# Patient Record
Sex: MALE | Race: WHITE | Employment: OTHER | URBAN - METROPOLITAN AREA
[De-identification: names, ages, dates, MRNs, and addresses within clinical notes are randomized per-mention and may not be internally consistent; named-entity substitution may affect disease eponyms.]

---

## 2017-06-05 ENCOUNTER — OFFICE VISIT (OUTPATIENT)
Dept: ORTHOPEDIC SURGERY | Age: 77
End: 2017-06-05

## 2017-06-05 VITALS
BODY MASS INDEX: 39.24 KG/M2 | HEIGHT: 67 IN | WEIGHT: 250 LBS | SYSTOLIC BLOOD PRESSURE: 120 MMHG | DIASTOLIC BLOOD PRESSURE: 73 MMHG | HEART RATE: 73 BPM

## 2017-06-05 DIAGNOSIS — M25.562 LEFT KNEE PAIN, UNSPECIFIED CHRONICITY: ICD-10-CM

## 2017-06-05 DIAGNOSIS — M17.12 PRIMARY LOCALIZED OSTEOARTHROSIS, LOWER LEG, LEFT: Primary | ICD-10-CM

## 2017-06-05 PROCEDURE — 99213 OFFICE O/P EST LOW 20 MIN: CPT | Performed by: ORTHOPAEDIC SURGERY

## 2017-06-05 PROCEDURE — 73564 X-RAY EXAM KNEE 4 OR MORE: CPT | Performed by: ORTHOPAEDIC SURGERY

## 2017-06-05 PROCEDURE — 1123F ACP DISCUSS/DSCN MKR DOCD: CPT | Performed by: ORTHOPAEDIC SURGERY

## 2017-06-05 PROCEDURE — 4004F PT TOBACCO SCREEN RCVD TLK: CPT | Performed by: ORTHOPAEDIC SURGERY

## 2017-06-05 PROCEDURE — 4040F PNEUMOC VAC/ADMIN/RCVD: CPT | Performed by: ORTHOPAEDIC SURGERY

## 2017-06-05 PROCEDURE — 20611 DRAIN/INJ JOINT/BURSA W/US: CPT | Performed by: ORTHOPAEDIC SURGERY

## 2017-06-05 PROCEDURE — G8427 DOCREV CUR MEDS BY ELIG CLIN: HCPCS | Performed by: ORTHOPAEDIC SURGERY

## 2017-06-05 PROCEDURE — G8417 CALC BMI ABV UP PARAM F/U: HCPCS | Performed by: ORTHOPAEDIC SURGERY

## 2017-09-12 ENCOUNTER — NURSE ONLY (OUTPATIENT)
Dept: ORTHOPEDIC SURGERY | Age: 77
End: 2017-09-12

## 2017-09-12 DIAGNOSIS — M17.12 PRIMARY OSTEOARTHRITIS OF LEFT KNEE: Primary | ICD-10-CM

## 2017-09-12 PROCEDURE — 20611 DRAIN/INJ JOINT/BURSA W/US: CPT | Performed by: PHYSICIAN ASSISTANT

## 2018-01-09 ENCOUNTER — OFFICE VISIT (OUTPATIENT)
Dept: ORTHOPEDIC SURGERY | Age: 78
End: 2018-01-09

## 2018-01-09 VITALS
DIASTOLIC BLOOD PRESSURE: 86 MMHG | HEIGHT: 67 IN | HEART RATE: 50 BPM | SYSTOLIC BLOOD PRESSURE: 153 MMHG | WEIGHT: 250 LBS | BODY MASS INDEX: 39.24 KG/M2

## 2018-01-09 DIAGNOSIS — M25.562 LEFT KNEE PAIN, UNSPECIFIED CHRONICITY: ICD-10-CM

## 2018-01-09 DIAGNOSIS — M17.12 PRIMARY OSTEOARTHRITIS OF LEFT KNEE: Primary | ICD-10-CM

## 2018-01-09 PROCEDURE — G8427 DOCREV CUR MEDS BY ELIG CLIN: HCPCS | Performed by: PHYSICIAN ASSISTANT

## 2018-01-09 PROCEDURE — G8484 FLU IMMUNIZE NO ADMIN: HCPCS | Performed by: PHYSICIAN ASSISTANT

## 2018-01-09 PROCEDURE — 4004F PT TOBACCO SCREEN RCVD TLK: CPT | Performed by: PHYSICIAN ASSISTANT

## 2018-01-09 PROCEDURE — 99213 OFFICE O/P EST LOW 20 MIN: CPT | Performed by: PHYSICIAN ASSISTANT

## 2018-01-09 PROCEDURE — G8417 CALC BMI ABV UP PARAM F/U: HCPCS | Performed by: PHYSICIAN ASSISTANT

## 2018-01-09 PROCEDURE — 1123F ACP DISCUSS/DSCN MKR DOCD: CPT | Performed by: PHYSICIAN ASSISTANT

## 2018-01-09 PROCEDURE — 20611 DRAIN/INJ JOINT/BURSA W/US: CPT | Performed by: PHYSICIAN ASSISTANT

## 2018-01-09 PROCEDURE — 4040F PNEUMOC VAC/ADMIN/RCVD: CPT | Performed by: PHYSICIAN ASSISTANT

## 2018-01-09 NOTE — PROGRESS NOTES
Chief Complaint    Knee Pain (LEFT knee pain; would like to try Euflexxa series; Cortisone on 9/12/17 helped for a while)      History of Present Illness:  Valerie Salazar is a 69 y.o. male presents to the office today for a follow-up visit. Being treated for left knee osteoarthritis. He received a cortisone injection in June and September. His results were favorable but his symptoms have returned. Pain concentrated over the medial aspect of his knee. Increased pain with activities and improvement with rest.  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 Pain Assessment  Location of Pain: Knee  Location Modifiers: Left  Severity of Pain: 2  Quality of Pain: Throbbing, Sharp, Dull, Aching  Duration of Pain: Persistent  Frequency of Pain: Intermittent  Aggravating Factors: Walking, Standing, Bending, Straightening  Limiting Behavior: Yes  Relieving Factors: Rest]       Medical History:  Patient's medications, allergies, past medical, surgical, social and family histories were reviewed and updated as appropriate. Review of Systems:  Relevant review of systems reviewed and available in the patient's chart    Vital Signs:  Vitals:    01/09/18 1054   BP: (!) 153/86   Pulse: 50       General Exam:   Constitutional: Patient is adequately groomed with no evidence of malnutrition  DTRs: Deep tendon reflexes are intact  Mental Status: The patient is oriented to time, place and person. The patient's mood and affect are appropriate. Lymphatic: The lymphatic examination bilaterally reveals all areas to be without enlargement or induration. Vascular: Examination reveals no swelling or calf tenderness. Peripheral pulses are palpable and 2+. Neurological: The patient has good coordination. There is no weakness or sensory deficit. Body mass index is 39.15 kg/m².     Left Knee

## 2018-01-12 ENCOUNTER — TELEPHONE (OUTPATIENT)
Dept: ORTHOPEDIC SURGERY | Age: 78
End: 2018-01-12

## 2018-01-12 DIAGNOSIS — M17.12 PRIMARY LOCALIZED OSTEOARTHROSIS OF LEFT LOWER LEG: Primary | ICD-10-CM

## 2018-01-12 RX ORDER — TRAMADOL HYDROCHLORIDE 50 MG/1
50 TABLET ORAL EVERY 6 HOURS PRN
Qty: 12 TABLET | Refills: 0 | Status: SHIPPED | OUTPATIENT
Start: 2018-01-12 | End: 2018-01-15

## 2018-01-16 ENCOUNTER — NURSE ONLY (OUTPATIENT)
Dept: ORTHOPEDIC SURGERY | Age: 78
End: 2018-01-16

## 2018-01-16 DIAGNOSIS — M17.12 PRIMARY OSTEOARTHRITIS OF LEFT KNEE: Primary | ICD-10-CM

## 2018-01-16 PROCEDURE — 20611 DRAIN/INJ JOINT/BURSA W/US: CPT | Performed by: PHYSICIAN ASSISTANT

## 2018-01-16 NOTE — PROGRESS NOTES
Diagnosis: Left knee osteoarthritis    Procedure: Left knee Visco supplementation #2    The patient returns today for their second Euflexxa injection in the left knee. The risks, benefits, and complications of the injections were again discussed in detail with the patient. The risks discussed included but are not limited to infection, skin reactions, hot swollen joints, and anaphylaxis. The patient gave verbal informed consent for the injection. The patient skin was prepped with 3 sterile gauze pads soaked with alcohol solution and the knee joint was injected with 2 mL of Euflexxa intra-articularly under sterile conditions . Technique: Under sterile conditions a [employer] ultrasound unit with a variable frequency (6.0-15.0 MHz) linear transducer was used to localize the placement of a 22-gauge needle into the madai joint. Findings: Successful needle placement for intra-articular Visco supplementation injection. Final images were taken and saved for permanent record. The patient tolerated the injection reasonably well. The patient was given instructions to ice the the knee and avoid strenuous activities for 24-48 hours. The patient was instructed to call the office immediately if there is increased pain, redness, warmth, fever, or chills. We will see the patient back in one week for the third injection.

## 2018-01-16 NOTE — PROGRESS NOTES
1/16/18 9:49 AM     Site & comments:   LEFT KNEE #2    [address]: [address]-8    Lot number: [id number] ; EXP 11/26/2018    Product:  Sujey Sarkart

## 2018-01-23 ENCOUNTER — NURSE ONLY (OUTPATIENT)
Dept: ORTHOPEDIC SURGERY | Age: 78
End: 2018-01-23

## 2018-01-23 DIAGNOSIS — M17.12 PRIMARY OSTEOARTHRITIS OF LEFT KNEE: Primary | ICD-10-CM

## 2018-01-23 PROCEDURE — 20611 DRAIN/INJ JOINT/BURSA W/US: CPT | Performed by: PHYSICIAN ASSISTANT

## 2018-01-23 NOTE — PROGRESS NOTES
1/23/18 9:12 AM     Site & comments:   LEFT KNEE #3    NDC: [address]-1    Lot number: [id number] ; EXP 1/26/2018    Product:  Deena Wilder

## 2018-06-13 ENCOUNTER — NURSE ONLY (OUTPATIENT)
Dept: ORTHOPEDIC SURGERY | Age: 78
End: 2018-06-13

## 2018-06-13 DIAGNOSIS — M17.12 PRIMARY OSTEOARTHRITIS OF LEFT KNEE: Primary | ICD-10-CM

## 2018-06-13 PROCEDURE — 20611 DRAIN/INJ JOINT/BURSA W/US: CPT | Performed by: PHYSICIAN ASSISTANT

## 2020-06-20 ENCOUNTER — HOSPITAL ENCOUNTER (INPATIENT)
Age: 80
LOS: 3 days | Discharge: HOME | DRG: 378 | End: 2020-06-23
Attending: EMERGENCY MEDICINE | Admitting: INTERNAL MEDICINE
Payer: MEDICARE

## 2020-06-20 PROBLEM — K92.2 LOWER GI BLEED: Status: ACTIVE | Noted: 2020-06-20

## 2020-06-20 LAB
A/G RATIO: 1.5 (ref 1.1–2.2)
ABO/RH: NORMAL
ALBUMIN SERPL-MCNC: 3.2 G/DL (ref 3.4–5)
ALP BLD-CCNC: 70 U/L (ref 40–129)
ALT SERPL-CCNC: 14 U/L (ref 10–40)
ANION GAP SERPL CALCULATED.3IONS-SCNC: 7 MMOL/L (ref 3–16)
ANTIBODY SCREEN: NORMAL
AST SERPL-CCNC: 20 U/L (ref 15–37)
BASOPHILS ABSOLUTE: 0 K/UL (ref 0–0.2)
BASOPHILS RELATIVE PERCENT: 0.8 %
BILIRUB SERPL-MCNC: 0.5 MG/DL (ref 0–1)
BUN BLDV-MCNC: 44 MG/DL (ref 7–20)
C DIFF TOXIN/ANTIGEN: NORMAL
CALCIUM SERPL-MCNC: 8.8 MG/DL (ref 8.3–10.6)
CHLORIDE BLD-SCNC: 104 MMOL/L (ref 99–110)
CO2: 25 MMOL/L (ref 21–32)
CREAT SERPL-MCNC: 2.4 MG/DL (ref 0.8–1.3)
EOSINOPHILS ABSOLUTE: 0.2 K/UL (ref 0–0.6)
EOSINOPHILS RELATIVE PERCENT: 2.4 %
GFR AFRICAN AMERICAN: 32
GFR NON-AFRICAN AMERICAN: 26
GLOBULIN: 2.2 G/DL
GLUCOSE BLD-MCNC: 174 MG/DL (ref 70–99)
HCT VFR BLD CALC: 25.8 % (ref 40.5–52.5)
HCT VFR BLD CALC: 25.9 % (ref 40.5–52.5)
HCT VFR BLD CALC: 31 % (ref 40.5–52.5)
HEMOGLOBIN: 10.4 G/DL (ref 13.5–17.5)
HEMOGLOBIN: 8.6 G/DL (ref 13.5–17.5)
HEMOGLOBIN: 8.6 G/DL (ref 13.5–17.5)
INR BLD: 1.18 (ref 0.86–1.14)
LYMPHOCYTES ABSOLUTE: 1.4 K/UL (ref 1–5.1)
LYMPHOCYTES RELATIVE PERCENT: 22.2 %
MCH RBC QN AUTO: 33.2 PG (ref 26–34)
MCHC RBC AUTO-ENTMCNC: 33.6 G/DL (ref 31–36)
MCV RBC AUTO: 98.7 FL (ref 80–100)
MONOCYTES ABSOLUTE: 0.4 K/UL (ref 0–1.3)
MONOCYTES RELATIVE PERCENT: 5.8 %
NEUTROPHILS ABSOLUTE: 4.4 K/UL (ref 1.7–7.7)
NEUTROPHILS RELATIVE PERCENT: 68.8 %
OCCULT BLOOD SCREENING: ABNORMAL
PDW BLD-RTO: 13.9 % (ref 12.4–15.4)
PLATELET # BLD: 165 K/UL (ref 135–450)
PMV BLD AUTO: 9 FL (ref 5–10.5)
POTASSIUM SERPL-SCNC: 5 MMOL/L (ref 3.5–5.1)
PROTHROMBIN TIME: 13.7 SEC (ref 10–13.2)
RBC # BLD: 3.14 M/UL (ref 4.2–5.9)
SODIUM BLD-SCNC: 136 MMOL/L (ref 136–145)
SPECIMEN STATUS: NORMAL
TOTAL PROTEIN: 5.4 G/DL (ref 6.4–8.2)
WBC # BLD: 6.4 K/UL (ref 4–11)

## 2020-06-20 PROCEDURE — 6370000000 HC RX 637 (ALT 250 FOR IP): Performed by: INTERNAL MEDICINE

## 2020-06-20 PROCEDURE — 85610 PROTHROMBIN TIME: CPT

## 2020-06-20 PROCEDURE — 85014 HEMATOCRIT: CPT

## 2020-06-20 PROCEDURE — 80053 COMPREHEN METABOLIC PANEL: CPT

## 2020-06-20 PROCEDURE — P9016 RBC LEUKOCYTES REDUCED: HCPCS

## 2020-06-20 PROCEDURE — 36415 COLL VENOUS BLD VENIPUNCTURE: CPT

## 2020-06-20 PROCEDURE — 85018 HEMOGLOBIN: CPT

## 2020-06-20 PROCEDURE — 2580000003 HC RX 258: Performed by: INTERNAL MEDICINE

## 2020-06-20 PROCEDURE — 99285 EMERGENCY DEPT VISIT HI MDM: CPT

## 2020-06-20 PROCEDURE — 86850 RBC ANTIBODY SCREEN: CPT

## 2020-06-20 PROCEDURE — 86901 BLOOD TYPING SEROLOGIC RH(D): CPT

## 2020-06-20 PROCEDURE — 85025 COMPLETE CBC W/AUTO DIFF WBC: CPT

## 2020-06-20 PROCEDURE — 87324 CLOSTRIDIUM AG IA: CPT

## 2020-06-20 PROCEDURE — G0328 FECAL BLOOD SCRN IMMUNOASSAY: HCPCS

## 2020-06-20 PROCEDURE — 87449 NOS EACH ORGANISM AG IA: CPT

## 2020-06-20 PROCEDURE — 86923 COMPATIBILITY TEST ELECTRIC: CPT

## 2020-06-20 PROCEDURE — 2060000000 HC ICU INTERMEDIATE R&B

## 2020-06-20 PROCEDURE — 2580000003 HC RX 258: Performed by: EMERGENCY MEDICINE

## 2020-06-20 PROCEDURE — 86900 BLOOD TYPING SEROLOGIC ABO: CPT

## 2020-06-20 RX ORDER — ANTIARTHRITIC COMBINATION NO.2 900 MG
TABLET ORAL
COMMUNITY

## 2020-06-20 RX ORDER — ASPIRIN 325 MG
325 TABLET ORAL DAILY
Status: ON HOLD | COMMUNITY
End: 2020-06-23 | Stop reason: HOSPADM

## 2020-06-20 RX ORDER — MONTELUKAST SODIUM 10 MG/1
10 TABLET ORAL NIGHTLY
Status: DISCONTINUED | OUTPATIENT
Start: 2020-06-20 | End: 2020-06-23 | Stop reason: HOSPADM

## 2020-06-20 RX ORDER — LOVASTATIN 20 MG/1
20 TABLET ORAL NIGHTLY
COMMUNITY

## 2020-06-20 RX ORDER — TRAMADOL HYDROCHLORIDE 50 MG/1
50 TABLET ORAL EVERY 6 HOURS PRN
Status: DISCONTINUED | OUTPATIENT
Start: 2020-06-20 | End: 2020-06-23 | Stop reason: HOSPADM

## 2020-06-20 RX ORDER — ATORVASTATIN CALCIUM 10 MG/1
10 TABLET, FILM COATED ORAL NIGHTLY
Status: DISCONTINUED | OUTPATIENT
Start: 2020-06-20 | End: 2020-06-23 | Stop reason: HOSPADM

## 2020-06-20 RX ORDER — 0.9 % SODIUM CHLORIDE 0.9 %
2000 INTRAVENOUS SOLUTION INTRAVENOUS ONCE
Status: COMPLETED | OUTPATIENT
Start: 2020-06-20 | End: 2020-06-20

## 2020-06-20 RX ORDER — ASCORBIC ACID 500 MG
500 TABLET ORAL DAILY
Status: DISCONTINUED | OUTPATIENT
Start: 2020-06-20 | End: 2020-06-23 | Stop reason: HOSPADM

## 2020-06-20 RX ORDER — SODIUM CHLORIDE 0.9 % (FLUSH) 0.9 %
10 SYRINGE (ML) INJECTION PRN
Status: DISCONTINUED | OUTPATIENT
Start: 2020-06-20 | End: 2020-06-23 | Stop reason: HOSPADM

## 2020-06-20 RX ORDER — PROCHLORPERAZINE EDISYLATE 5 MG/ML
10 INJECTION INTRAMUSCULAR; INTRAVENOUS EVERY 6 HOURS PRN
Status: DISCONTINUED | OUTPATIENT
Start: 2020-06-20 | End: 2020-06-23 | Stop reason: HOSPADM

## 2020-06-20 RX ORDER — SODIUM CHLORIDE 0.9 % (FLUSH) 0.9 %
10 SYRINGE (ML) INJECTION EVERY 12 HOURS SCHEDULED
Status: DISCONTINUED | OUTPATIENT
Start: 2020-06-20 | End: 2020-06-23 | Stop reason: HOSPADM

## 2020-06-20 RX ORDER — POLYETHYLENE GLYCOL 3350 17 G/17G
17 POWDER, FOR SOLUTION ORAL DAILY PRN
Status: DISCONTINUED | OUTPATIENT
Start: 2020-06-20 | End: 2020-06-23 | Stop reason: HOSPADM

## 2020-06-20 RX ORDER — ACETAMINOPHEN 325 MG/1
650 TABLET ORAL EVERY 6 HOURS PRN
Status: DISCONTINUED | OUTPATIENT
Start: 2020-06-20 | End: 2020-06-23 | Stop reason: HOSPADM

## 2020-06-20 RX ORDER — SODIUM CHLORIDE, SODIUM LACTATE, POTASSIUM CHLORIDE, CALCIUM CHLORIDE 600; 310; 30; 20 MG/100ML; MG/100ML; MG/100ML; MG/100ML
INJECTION, SOLUTION INTRAVENOUS CONTINUOUS
Status: DISCONTINUED | OUTPATIENT
Start: 2020-06-20 | End: 2020-06-23 | Stop reason: HOSPADM

## 2020-06-20 RX ORDER — LEVOTHYROXINE SODIUM 0.05 MG/1
50 TABLET ORAL DAILY
Status: DISCONTINUED | OUTPATIENT
Start: 2020-06-21 | End: 2020-06-23 | Stop reason: HOSPADM

## 2020-06-20 RX ORDER — ACETAMINOPHEN 650 MG/1
650 SUPPOSITORY RECTAL EVERY 6 HOURS PRN
Status: DISCONTINUED | OUTPATIENT
Start: 2020-06-20 | End: 2020-06-23 | Stop reason: HOSPADM

## 2020-06-20 RX ORDER — M-VIT,TX,IRON,MINS/CALC/FOLIC 27MG-0.4MG
1 TABLET ORAL DAILY
Status: DISCONTINUED | OUTPATIENT
Start: 2020-06-20 | End: 2020-06-23 | Stop reason: HOSPADM

## 2020-06-20 RX ORDER — ALBUTEROL SULFATE 2.5 MG/3ML
2.5 SOLUTION RESPIRATORY (INHALATION) EVERY 6 HOURS PRN
Status: DISCONTINUED | OUTPATIENT
Start: 2020-06-20 | End: 2020-06-23 | Stop reason: HOSPADM

## 2020-06-20 RX ADMIN — OXYCODONE HYDROCHLORIDE AND ACETAMINOPHEN 500 MG: 500 TABLET ORAL at 12:03

## 2020-06-20 RX ADMIN — BISACODYL 20 MG: 5 TABLET, COATED ORAL at 12:02

## 2020-06-20 RX ADMIN — TRAMADOL HYDROCHLORIDE 50 MG: 50 TABLET, FILM COATED ORAL at 12:03

## 2020-06-20 RX ADMIN — ATORVASTATIN CALCIUM 10 MG: 10 TABLET, FILM COATED ORAL at 20:35

## 2020-06-20 RX ADMIN — POLYETHYLENE GLYCOL 3350, SODIUM SULFATE ANHYDROUS, SODIUM BICARBONATE, SODIUM CHLORIDE, POTASSIUM CHLORIDE 2000 ML: 236; 22.74; 6.74; 5.86; 2.97 POWDER, FOR SOLUTION ORAL at 16:37

## 2020-06-20 RX ADMIN — SODIUM CHLORIDE, POTASSIUM CHLORIDE, SODIUM LACTATE AND CALCIUM CHLORIDE: 600; 310; 30; 20 INJECTION, SOLUTION INTRAVENOUS at 12:02

## 2020-06-20 RX ADMIN — MONTELUKAST 10 MG: 10 TABLET, FILM COATED ORAL at 20:35

## 2020-06-20 RX ADMIN — Medication 1 TABLET: at 12:02

## 2020-06-20 RX ADMIN — SODIUM CHLORIDE, POTASSIUM CHLORIDE, SODIUM LACTATE AND CALCIUM CHLORIDE: 600; 310; 30; 20 INJECTION, SOLUTION INTRAVENOUS at 23:14

## 2020-06-20 RX ADMIN — SODIUM CHLORIDE 2000 ML: 9 INJECTION, SOLUTION INTRAVENOUS at 08:28

## 2020-06-20 ASSESSMENT — ENCOUNTER SYMPTOMS
FACIAL SWELLING: 0
NAUSEA: 0
STRIDOR: 0
ABDOMINAL PAIN: 1
TROUBLE SWALLOWING: 0
VOMITING: 0
VOICE CHANGE: 0
SHORTNESS OF BREATH: 0
BACK PAIN: 0
COLOR CHANGE: 0
WHEEZING: 0
BLOOD IN STOOL: 1
PHOTOPHOBIA: 0

## 2020-06-20 ASSESSMENT — PAIN SCALES - GENERAL
PAINLEVEL_OUTOF10: 8
PAINLEVEL_OUTOF10: 3
PAINLEVEL_OUTOF10: 7

## 2020-06-20 ASSESSMENT — PAIN DESCRIPTION - LOCATION: LOCATION: ABDOMEN

## 2020-06-20 ASSESSMENT — PAIN DESCRIPTION - PAIN TYPE: TYPE: CHRONIC PAIN

## 2020-06-20 NOTE — H&P
Medication Sig Start Date End Date Taking? Authorizing Provider   Cholecalciferol (VITAMIN D-3 PO) Take by mouth   Yes Historical Provider, MD   Coenzyme Q10 (COQ10 PO) Take by mouth   Yes Historical Provider, MD   aspirin 325 MG tablet Take 325 mg by mouth daily   Yes Historical Provider, MD   Biotin 5000 MCG TABS Take by mouth   Yes Historical Provider, MD   lovastatin (MEVACOR) 20 MG tablet Take 20 mg by mouth nightly   Yes Historical Provider, MD   BACLOFEN PO Take by mouth   Yes Historical Provider, MD   Multiple Vitamins-Minerals (THERAPEUTIC MULTIVITAMIN-MINERALS) tablet Take 1 tablet by mouth daily   Yes Historical Provider, MD   Cyanocobalamin (VITAMIN B-12 CR PO) Take by mouth daily   Yes Historical Provider, MD   Ascorbic Acid (VITAMIN C) 500 MG tablet Take 500 mg by mouth daily   Yes Historical Provider, MD   albuterol (PROVENTIL) (2.5 MG/3ML) 0.083% nebulizer solution Take 2.5 mg by nebulization every 6 hours as needed for Wheezing   Yes Historical Provider, MD   nitroGLYCERIN (NITROSTAT) 0.4 MG SL tablet Place 0.4 mg under the tongue every 5 minutes as needed. Yes Historical Provider, MD   metoprolol (LOPRESSOR) 50 MG tablet Take 50 mg by mouth 2 times daily. Yes Historical Provider, MD   levothyroxine (SYNTHROID) 100 MCG tablet Take 50 mcg by mouth daily. Yes Historical Provider, MD   montelukast (SINGULAIR) 10 MG tablet Take 10 mg by mouth nightly. Yes Historical Provider, MD   albuterol (PROVENTIL HFA;VENTOLIN HFA) 108 (90 BASE) MCG/ACT inhaler Inhale 2 puffs into the lungs every 6 hours as needed. Yes Historical Provider, MD   IRON CR PO Take by mouth daily    Historical Provider, MD       Allergies:  Seasonal and Clindamycin hcl    Social History:      The patient currently lives at home    TOBACCO:   reports that he has been smoking cigarettes. He has a 20.00 pack-year smoking history.  He has never used smokeless tobacco.  ETOH:   reports no history of alcohol use.  E-Cigarettes Vaping or Juuling     Questions Responses    Vaping Use     Start Date     Does device contain nicotine? Quit Date     Vaping Type             Family History:      Reviewed in detail and negative for ESRD. Positive as follows:        Problem Relation Age of Onset    Cancer Maternal Grandmother        REVIEW OF SYSTEMS:   Pertinent positives as noted in the HPI. All other systems reviewed and negative. PHYSICAL EXAM PERFORMED:    BP (!) 90/57   Pulse 67   Temp 97.8 °F (36.6 °C) (Oral)   Resp 13   Ht 5' 7\" (1.702 m)   Wt 230 lb (104.3 kg)   SpO2 98%   BMI 36.02 kg/m²     General appearance:  No apparent distress, appears stated age and cooperative. HEENT:  Normal cephalic, atraumatic without obvious deformity. Pupils equal, round, and reactive to light. Extra ocular muscles intact. Conjunctivae/corneas clear. Neck: Supple, with full range of motion. No jugular venous distention. Trachea midline. Respiratory:  Normal respiratory effort. Clear to auscultation, bilaterally without Rales/Wheezes/Rhonchi. Cardiovascular:  Regular rate and rhythm with normal S1/S2 without murmurs, rubs or gallops. Abdomen: Soft, non-tender, non-distended with normal bowel sounds. Musculoskeletal:  No clubbing, cyanosis or edema bilaterally. Full range of motion without deformity. Skin: Skin color, texture, turgor normal.  No rashes or lesions. Neurologic:  Neurovascularly intact without any focal sensory/motor deficits.  Cranial nerves: II-XII intact, grossly non-focal.  Psychiatric:  Alert and oriented, thought content appropriate, normal insight  Capillary Refill: Brisk,< 3 seconds   Peripheral Pulses: +2 palpable, equal bilaterally       Labs:     Recent Labs     06/20/20  0725   WBC 6.4   HGB 10.4*   HCT 31.0*        Recent Labs     06/20/20  0725      K 5.0      CO2 25   BUN 44*   CREATININE 2.4*   CALCIUM 8.8     Recent Labs     06/20/20  0725   AST 20   ALT 14   BILITOT Khari Inman MD    Thank you Stephen Mccrary for the opportunity to be involved in this patient's care. If you have any questions or concerns please feel free to contact me at [phone].

## 2020-06-20 NOTE — ED NOTES
Bed: 03  Expected date:   Expected time:   Means of arrival:   Comments:  [address] RN  06/20/20 2838

## 2020-06-20 NOTE — ED PROVIDER NOTES
[facility]  ED  eMERGENCY dEPARTMENT eNCOUnter      Pt Name: Nicole Miller  MRN: [id number]  [place] 1940  Date of evaluation: 6/20/2020  Provider: Kumar Barlow MD    [address] [place] [address]       Chief Complaint   Patient presents with    Rectal Bleeding     pt states blood in stool that he noticed last night and it was bright red         HISTORY OF PRESENT ILLNESS   (Location/Symptom, Timing/Onset, Context/Setting, Quality, Duration, Modifying Factors, Severity)  Note limiting factors. Nicole Miller is a 79 y.o. male with hx of gastric bypass surgery and CAD status post CABG who currently takes aspirin but denies any other antiplatelet agents and denies any anticoagulants who presents after noticing bright red blood in his stool last night and this morning. The patient denies any abdominal trauma. He reports he has chronic pressure abdominal pain but reports this is not unusual for him and describes the pain as \"just a gas\". He denies any nausea or vomiting. He denies any chest pain shortness of breath or syncope. He reports his symptoms are moderate, constant, and unchanged. He reports having bowel movements worsens his symptoms and nothing improves them. HPI    Nursing Notes were reviewed. REVIEW OFSYSTEMS    (2-9 systems for level 4, 10 or more for level 5)     Review of Systems   Constitutional: Negative for appetite change, fever and unexpected weight change. HENT: Negative for facial swelling, trouble swallowing and voice change. Eyes: Negative for photophobia and visual disturbance. Respiratory: Negative for shortness of breath, wheezing and stridor. Cardiovascular: Negative for chest pain and palpitations. Gastrointestinal: Positive for abdominal pain and blood in stool. Negative for nausea and vomiting. Genitourinary: Negative for difficulty urinating and dysuria. Musculoskeletal: Negative for back pain, gait problem and neck pain.    Skin: Negative for METOPROLOL (LOPRESSOR) 50 MG TABLET    Take 50 mg by mouth 2 times daily. MONTELUKAST (SINGULAIR) 10 MG TABLET    Take 10 mg by mouth nightly. MULTIPLE VITAMINS-MINERALS (THERAPEUTIC MULTIVITAMIN-MINERALS) TABLET    Take 1 tablet by mouth daily    NITROGLYCERIN (NITROSTAT) 0.4 MG SL TABLET    Place 0.4 mg under the tongue every 5 minutes as needed.          ALLERGIES     Seasonal and Clindamycin hcl    FAMILY HISTORY       Family History   Problem Relation Age of Onset    Cancer Maternal Grandmother           SOCIAL HISTORY       Social History     Socioeconomic History    Marital status:      Spouse name: None    Number of children: None    Years of education: None    Highest education level: None   Occupational History    None   Social Needs    Financial resource strain: None    Food insecurity     Worry: None     Inability: None    Transportation needs     Medical: None     Non-medical: None   Tobacco Use    Smoking status: Current Every Day Smoker     Packs/day: 1.00     Years: 20.00     Pack years: 20.00     Types: Cigarettes    Smokeless tobacco: Never Used    Tobacco comment: plans on quitting 9/2016 when he gets a total knee   Substance and Sexual Activity    Alcohol use: No     Comment: pt poor historian    Drug use: None    Sexual activity: None   Lifestyle    Physical activity     Days per week: None     Minutes per session: None    Stress: None   Relationships    Social connections     Talks on phone: None     Gets together: None     Attends [employer] service: None     Active member of club or organization: None     Attends meetings of clubs or organizations: None     Relationship status: None    Intimate partner violence     Fear of current or ex partner: None     Emotionally abused: None     Physically abused: None     Forced sexual activity: None   Other Topics Concern    None   Social History Narrative    None         PHYSICAL EXAM    (up to 7 for level 4, 8 or

## 2020-06-20 NOTE — CONSULTS
MD   albuterol (PROVENTIL) (2.5 MG/3ML) 0.083% nebulizer solution Take 2.5 mg by nebulization every 6 hours as needed for Wheezing   Yes Historical Provider, MD   nitroGLYCERIN (NITROSTAT) 0.4 MG SL tablet Place 0.4 mg under the tongue every 5 minutes as needed. Yes Historical Provider, MD   metoprolol (LOPRESSOR) 50 MG tablet Take 50 mg by mouth 2 times daily. Yes Historical Provider, MD   levothyroxine (SYNTHROID) 100 MCG tablet Take 50 mcg by mouth daily. Yes Historical Provider, MD   montelukast (SINGULAIR) 10 MG tablet Take 10 mg by mouth nightly. Yes Historical Provider, MD   albuterol (PROVENTIL HFA;VENTOLIN HFA) 108 (90 BASE) MCG/ACT inhaler Inhale 2 puffs into the lungs every 6 hours as needed. Yes Historical Provider, MD   IRON CR PO Take by mouth daily    Historical Provider, MD      Scheduled Medications:    vitamin C  500 mg Oral Daily    [START ON 6/21/2020] levothyroxine  50 mcg Oral Daily    atorvastatin  10 mg Oral Nightly    montelukast  10 mg Oral Nightly    therapeutic multivitamin-minerals  1 tablet Oral Daily    sodium chloride flush  10 mL Intravenous 2 times per day    bisacodyl  20 mg Oral Once    polyethylene glycol  2,000 mL Oral Once     Infusions:    lactated ringers       PRN Medications: albuterol, sodium chloride flush, acetaminophen **OR** acetaminophen, polyethylene glycol, prochlorperazine, traMADol  Allergies:    Allergies   Allergen Reactions    Seasonal      Pollen    Clindamycin Hcl Rash       Past Medical History:   Diagnosis Date    Arthritis     Asthma     COPD    CAD (coronary artery disease)     S/P CABG    Gastric bypass status for obesity     Hyperlipidemia     Hypertension     Low kidney function     Osteoarthritis     Thyroid disease      Past Surgical History:   Procedure Laterality Date    COLECTOMY  2003    COLONOSCOPY  5/11    diverticulosis    COLONOSCOPY  5/6/2016    Colon Ulcer    CORONARY ARTERY BYPASS GRAFT  2007    3 GLUCOSE 174*   CALCIUM 8.8   PROT 5.4*   LABALBU 3.2*   AST 20   ALT 14   ALKPHOS 70   BILITOT 0.5       Assessment:     Patient Active Problem List    Diagnosis Date Noted    Lower GI bleed 06/20/2020    Primary osteoarthritis of left knee 09/12/2017    Rotator cuff arthropathy 09/15/2016    Colon ulcer 05/06/2016    Complete rotator cuff tear 08/11/2015    Rotator cuff strain 07/31/2015    Shoulder pain 07/31/2015    Primary localized osteoarthrosis, lower leg 02/13/2015     79 Y M with a h/o HTN, HLD, CAD s/p CABG and s/p gastric bypass presents w hematochezia    Plan:   1. F/U H/H  2. Colonoscopy in am  3.  Will follow    Holly Molina MD       O) [phone]

## 2020-06-21 ENCOUNTER — APPOINTMENT (OUTPATIENT)
Dept: NUCLEAR MEDICINE | Age: 80
DRG: 378 | End: 2020-06-21
Payer: MEDICARE

## 2020-06-21 LAB
ANION GAP SERPL CALCULATED.3IONS-SCNC: 9 MMOL/L (ref 3–16)
BLOOD BANK DISPENSE STATUS: NORMAL
BLOOD BANK DISPENSE STATUS: NORMAL
BLOOD BANK PRODUCT CODE: NORMAL
BLOOD BANK PRODUCT CODE: NORMAL
BPU ID: NORMAL
BPU ID: NORMAL
BUN BLDV-MCNC: 47 MG/DL (ref 7–20)
CALCIUM SERPL-MCNC: 7.7 MG/DL (ref 8.3–10.6)
CHLORIDE BLD-SCNC: 105 MMOL/L (ref 99–110)
CO2: 20 MMOL/L (ref 21–32)
CREAT SERPL-MCNC: 1.9 MG/DL (ref 0.8–1.3)
DESCRIPTION BLOOD BANK: NORMAL
DESCRIPTION BLOOD BANK: NORMAL
GFR AFRICAN AMERICAN: 42
GFR NON-AFRICAN AMERICAN: 34
GLUCOSE BLD-MCNC: 185 MG/DL (ref 70–99)
HCT VFR BLD CALC: 19.4 % (ref 40.5–52.5)
HCT VFR BLD CALC: 20.4 % (ref 40.5–52.5)
HCT VFR BLD CALC: 21.3 % (ref 40.5–52.5)
HEMOGLOBIN: 6.5 G/DL (ref 13.5–17.5)
HEMOGLOBIN: 6.8 G/DL (ref 13.5–17.5)
HEMOGLOBIN: 7.2 G/DL (ref 13.5–17.5)
MCH RBC QN AUTO: 32.9 PG (ref 26–34)
MCHC RBC AUTO-ENTMCNC: 33.4 G/DL (ref 31–36)
MCV RBC AUTO: 98.7 FL (ref 80–100)
PDW BLD-RTO: 14.2 % (ref 12.4–15.4)
PLATELET # BLD: 120 K/UL (ref 135–450)
PMV BLD AUTO: 9.1 FL (ref 5–10.5)
POTASSIUM REFLEX MAGNESIUM: 4.8 MMOL/L (ref 3.5–5.1)
RBC # BLD: 1.97 M/UL (ref 4.2–5.9)
SODIUM BLD-SCNC: 134 MMOL/L (ref 136–145)
WBC # BLD: 6.1 K/UL (ref 4–11)

## 2020-06-21 PROCEDURE — 6360000002 HC RX W HCPCS: Performed by: INTERNAL MEDICINE

## 2020-06-21 PROCEDURE — 3609027000 HC COLONOSCOPY: Performed by: INTERNAL MEDICINE

## 2020-06-21 PROCEDURE — 7100000011 HC PHASE II RECOVERY - ADDTL 15 MIN: Performed by: INTERNAL MEDICINE

## 2020-06-21 PROCEDURE — 2580000003 HC RX 258: Performed by: INTERNAL MEDICINE

## 2020-06-21 PROCEDURE — 94761 N-INVAS EAR/PLS OXIMETRY MLT: CPT

## 2020-06-21 PROCEDURE — 2709999900 HC NON-CHARGEABLE SUPPLY: Performed by: INTERNAL MEDICINE

## 2020-06-21 PROCEDURE — 78278 ACUTE GI BLOOD LOSS IMAGING: CPT

## 2020-06-21 PROCEDURE — 85018 HEMOGLOBIN: CPT

## 2020-06-21 PROCEDURE — 36430 TRANSFUSION BLD/BLD COMPNT: CPT

## 2020-06-21 PROCEDURE — 3430000000 HC RX DIAGNOSTIC RADIOPHARMACEUTICAL: Performed by: INTERNAL MEDICINE

## 2020-06-21 PROCEDURE — 2580000003 HC RX 258: Performed by: NURSE PRACTITIONER

## 2020-06-21 PROCEDURE — 2060000000 HC ICU INTERMEDIATE R&B

## 2020-06-21 PROCEDURE — 2700000000 HC OXYGEN THERAPY PER DAY

## 2020-06-21 PROCEDURE — 85014 HEMATOCRIT: CPT

## 2020-06-21 PROCEDURE — 6370000000 HC RX 637 (ALT 250 FOR IP): Performed by: INTERNAL MEDICINE

## 2020-06-21 PROCEDURE — A9560 TC99M LABELED RBC: HCPCS | Performed by: INTERNAL MEDICINE

## 2020-06-21 PROCEDURE — 36415 COLL VENOUS BLD VENIPUNCTURE: CPT

## 2020-06-21 PROCEDURE — 6370000000 HC RX 637 (ALT 250 FOR IP): Performed by: NURSE PRACTITIONER

## 2020-06-21 PROCEDURE — 0DJD8ZZ INSPECTION OF LOWER INTESTINAL TRACT, VIA NATURAL OR ARTIFICIAL OPENING ENDOSCOPIC: ICD-10-PCS | Performed by: INTERNAL MEDICINE

## 2020-06-21 PROCEDURE — C9113 INJ PANTOPRAZOLE SODIUM, VIA: HCPCS | Performed by: INTERNAL MEDICINE

## 2020-06-21 PROCEDURE — 7100000010 HC PHASE II RECOVERY - FIRST 15 MIN: Performed by: INTERNAL MEDICINE

## 2020-06-21 PROCEDURE — 99152 MOD SED SAME PHYS/QHP 5/>YRS: CPT | Performed by: INTERNAL MEDICINE

## 2020-06-21 PROCEDURE — 80048 BASIC METABOLIC PNL TOTAL CA: CPT

## 2020-06-21 PROCEDURE — 85027 COMPLETE CBC AUTOMATED: CPT

## 2020-06-21 RX ORDER — PANTOPRAZOLE SODIUM 40 MG/1
40 TABLET, DELAYED RELEASE ORAL
Status: DISCONTINUED | OUTPATIENT
Start: 2020-06-21 | End: 2020-06-21

## 2020-06-21 RX ORDER — SIMETHICONE 80 MG
80 TABLET,CHEWABLE ORAL ONCE
Status: COMPLETED | OUTPATIENT
Start: 2020-06-21 | End: 2020-06-21

## 2020-06-21 RX ORDER — CALCIUM CARBONATE 200(500)MG
1000 TABLET,CHEWABLE ORAL 3 TIMES DAILY PRN
Status: DISCONTINUED | OUTPATIENT
Start: 2020-06-21 | End: 2020-06-23 | Stop reason: HOSPADM

## 2020-06-21 RX ORDER — PANTOPRAZOLE SODIUM 40 MG/10ML
40 INJECTION, POWDER, LYOPHILIZED, FOR SOLUTION INTRAVENOUS 2 TIMES DAILY
Status: DISCONTINUED | OUTPATIENT
Start: 2020-06-21 | End: 2020-06-23 | Stop reason: HOSPADM

## 2020-06-21 RX ORDER — MIDAZOLAM HYDROCHLORIDE 5 MG/ML
INJECTION INTRAMUSCULAR; INTRAVENOUS PRN
Status: DISCONTINUED | OUTPATIENT
Start: 2020-06-21 | End: 2020-06-21 | Stop reason: ALTCHOICE

## 2020-06-21 RX ORDER — 0.9 % SODIUM CHLORIDE 0.9 %
20 INTRAVENOUS SOLUTION INTRAVENOUS ONCE
Status: COMPLETED | OUTPATIENT
Start: 2020-06-21 | End: 2020-06-21

## 2020-06-21 RX ORDER — FENTANYL CITRATE 50 UG/ML
INJECTION, SOLUTION INTRAMUSCULAR; INTRAVENOUS PRN
Status: DISCONTINUED | OUTPATIENT
Start: 2020-06-21 | End: 2020-06-21 | Stop reason: ALTCHOICE

## 2020-06-21 RX ADMIN — Medication 10 ML: at 13:20

## 2020-06-21 RX ADMIN — SODIUM CHLORIDE 20 ML: 900 INJECTION, SOLUTION INTRAVENOUS at 21:48

## 2020-06-21 RX ADMIN — Medication 26.2 MILLICURIE: at 16:25

## 2020-06-21 RX ADMIN — PANTOPRAZOLE SODIUM 40 MG: 40 INJECTION, POWDER, FOR SOLUTION INTRAVENOUS at 20:03

## 2020-06-21 RX ADMIN — Medication 10 ML: at 20:03

## 2020-06-21 RX ADMIN — ATORVASTATIN CALCIUM 10 MG: 10 TABLET, FILM COATED ORAL at 20:03

## 2020-06-21 RX ADMIN — SODIUM CHLORIDE, POTASSIUM CHLORIDE, SODIUM LACTATE AND CALCIUM CHLORIDE: 600; 310; 30; 20 INJECTION, SOLUTION INTRAVENOUS at 13:30

## 2020-06-21 RX ADMIN — SIMETHICONE 80 MG: 80 TABLET, CHEWABLE ORAL at 04:04

## 2020-06-21 RX ADMIN — ANTACID TABLETS 500 MG: 500 TABLET, CHEWABLE ORAL at 08:06

## 2020-06-21 RX ADMIN — Medication 1 LOZENGE: at 21:58

## 2020-06-21 RX ADMIN — PANTOPRAZOLE SODIUM 40 MG: 40 TABLET, DELAYED RELEASE ORAL at 08:06

## 2020-06-21 RX ADMIN — MONTELUKAST 10 MG: 10 TABLET, FILM COATED ORAL at 20:03

## 2020-06-21 RX ADMIN — SODIUM CHLORIDE 20 ML: 9 INJECTION, SOLUTION INTRAVENOUS at 08:21

## 2020-06-21 RX ADMIN — PROCHLORPERAZINE EDISYLATE 10 MG: 5 INJECTION INTRAMUSCULAR; INTRAVENOUS at 04:56

## 2020-06-21 NOTE — PROGRESS NOTES
Spoke with clinical and reviewed nm testing order. Stephania told me to call the radiology team and they can call the nm team in. I spoke with radiology and they are to call me back. Received a call back from nmsunday stated dose for this test will come in around 4pm. Test is a 2 hours scan. Plan to scan from 4ish to 6ish. Pt and md updated.

## 2020-06-21 NOTE — PROGRESS NOTES
Report given to endo 0800- blood infusing. Pt alert and orient. Oxygen at 2 liters for comfort, low blood level, pt out to endo, cmu aware. I will go with pt and give update to receiving nurse.

## 2020-06-21 NOTE — PROGRESS NOTES
Pt doing well post colonoscopy, with mild hypotension when lying on left side. Position changed to supine and B/P normalized. Arouses easily. Denies pain.

## 2020-06-21 NOTE — H&P
Pre-sedation Assessment    History and Physical / Pre-Sedation Assessment  Patient:  Karl De La Torre   :   1940     Intended Procedure: colonoscopy      HPI: 79 Y M with a h/o HTN, HLD, CAD s/p CABG and s/p gastric bypass presents w hematochezia    Current Facility-Administered Medications   Medication Dose Route Frequency Provider Last Rate Last Dose    0.9 % sodium chloride bolus  20 mL Intravenous Once Bj Whittaker MD 10 mL/hr at 20 0821 20 mL at 20 0821    pantoprazole (PROTONIX) tablet 40 mg  40 mg Oral QAM AC Ana Will MD   40 mg at 20 0806    calcium carbonate (TUMS) chewable tablet 1,000 mg  1,000 mg Oral TID PRN Ana Will MD   500 mg at 20 0806    fentaNYL (SUBLIMAZE) injection    PRN Sulema Kanner, MD   25 mcg at 20 0920    midazolam (VERSED) injection    PRN Sulema Kanner, MD   2 mg at 20 0920    albuterol (PROVENTIL) nebulizer solution 2.5 mg  2.5 mg Nebulization Q6H PRN Ana Will MD        vitamin C (ASCORBIC ACID) tablet 500 mg  500 mg Oral Daily Ana Will MD   500 mg at 20 1203    levothyroxine (SYNTHROID) tablet 50 mcg  50 mcg Oral Daily Ana Will MD        atorvastatin (LIPITOR) tablet 10 mg  10 mg Oral Nightly Ana Will MD   10 mg at 20    montelukast (SINGULAIR) tablet 10 mg  10 mg Oral Nightly Ana Will MD   10 mg at 20    therapeutic multivitamin-minerals 1 tablet  1 tablet Oral Daily Ana Will MD   1 tablet at 20 120    sodium chloride flush 0.9 % injection 10 mL  10 mL Intravenous 2 times per day Ana Will MD        sodium chloride flush 0.9 % injection 10 mL  10 mL Intravenous PRN Ana Will MD        acetaminophen (TYLENOL) tablet 650 mg  650 mg Oral Q6H PRN Ana Will MD        Or    acetaminophen (TYLENOL) suppository 650 mg  650 mg Rectal Q6H PRN Ana Will MD        polyethylene glycol (GLYCOLAX) packet 17 g sedation plan. I have explained the risk, benefits, and alternatives to the procedure. The patient understands and agrees to proceed.   Yes    Diallo Farr MD       (O) [phone]          Diallo Farr  9:19 AM 6/21/2020

## 2020-06-22 ENCOUNTER — ANESTHESIA EVENT (OUTPATIENT)
Dept: ENDOSCOPY | Age: 80
DRG: 378 | End: 2020-06-22
Payer: MEDICARE

## 2020-06-22 ENCOUNTER — ANESTHESIA (OUTPATIENT)
Dept: ENDOSCOPY | Age: 80
DRG: 378 | End: 2020-06-22
Payer: MEDICARE

## 2020-06-22 VITALS — OXYGEN SATURATION: 89 % | DIASTOLIC BLOOD PRESSURE: 50 MMHG | SYSTOLIC BLOOD PRESSURE: 123 MMHG

## 2020-06-22 LAB
ANION GAP SERPL CALCULATED.3IONS-SCNC: 8 MMOL/L (ref 3–16)
BUN BLDV-MCNC: 40 MG/DL (ref 7–20)
CALCIUM SERPL-MCNC: 7.7 MG/DL (ref 8.3–10.6)
CHLORIDE BLD-SCNC: 109 MMOL/L (ref 99–110)
CO2: 21 MMOL/L (ref 21–32)
CREAT SERPL-MCNC: 1.9 MG/DL (ref 0.8–1.3)
GFR AFRICAN AMERICAN: 42
GFR NON-AFRICAN AMERICAN: 34
GLUCOSE BLD-MCNC: 136 MG/DL (ref 70–99)
HCT VFR BLD CALC: 20.3 % (ref 40.5–52.5)
HCT VFR BLD CALC: 22.3 % (ref 40.5–52.5)
HCT VFR BLD CALC: 23.1 % (ref 40.5–52.5)
HCT VFR BLD CALC: 23.2 % (ref 40.5–52.5)
HEMOGLOBIN: 6.9 G/DL (ref 13.5–17.5)
HEMOGLOBIN: 7.4 G/DL (ref 13.5–17.5)
HEMOGLOBIN: 7.7 G/DL (ref 13.5–17.5)
HEMOGLOBIN: 7.8 G/DL (ref 13.5–17.5)
MCH RBC QN AUTO: 31.4 PG (ref 26–34)
MCHC RBC AUTO-ENTMCNC: 33.3 G/DL (ref 31–36)
MCV RBC AUTO: 94.2 FL (ref 80–100)
PDW BLD-RTO: 17.5 % (ref 12.4–15.4)
PLATELET # BLD: 122 K/UL (ref 135–450)
PMV BLD AUTO: 8.6 FL (ref 5–10.5)
POTASSIUM REFLEX MAGNESIUM: 4.5 MMOL/L (ref 3.5–5.1)
RBC # BLD: 2.36 M/UL (ref 4.2–5.9)
SODIUM BLD-SCNC: 138 MMOL/L (ref 136–145)
WBC # BLD: 8.6 K/UL (ref 4–11)

## 2020-06-22 PROCEDURE — 6370000000 HC RX 637 (ALT 250 FOR IP): Performed by: NURSE PRACTITIONER

## 2020-06-22 PROCEDURE — C9113 INJ PANTOPRAZOLE SODIUM, VIA: HCPCS | Performed by: INTERNAL MEDICINE

## 2020-06-22 PROCEDURE — 85018 HEMOGLOBIN: CPT

## 2020-06-22 PROCEDURE — 2580000003 HC RX 258: Performed by: INTERNAL MEDICINE

## 2020-06-22 PROCEDURE — 88305 TISSUE EXAM BY PATHOLOGIST: CPT

## 2020-06-22 PROCEDURE — 2580000003 HC RX 258: Performed by: NURSE ANESTHETIST, CERTIFIED REGISTERED

## 2020-06-22 PROCEDURE — 2500000003 HC RX 250 WO HCPCS: Performed by: NURSE ANESTHETIST, CERTIFIED REGISTERED

## 2020-06-22 PROCEDURE — 85027 COMPLETE CBC AUTOMATED: CPT

## 2020-06-22 PROCEDURE — 3700000000 HC ANESTHESIA ATTENDED CARE: Performed by: INTERNAL MEDICINE

## 2020-06-22 PROCEDURE — 2060000000 HC ICU INTERMEDIATE R&B

## 2020-06-22 PROCEDURE — 6360000002 HC RX W HCPCS: Performed by: INTERNAL MEDICINE

## 2020-06-22 PROCEDURE — 6370000000 HC RX 637 (ALT 250 FOR IP): Performed by: INTERNAL MEDICINE

## 2020-06-22 PROCEDURE — 7100000010 HC PHASE II RECOVERY - FIRST 15 MIN: Performed by: INTERNAL MEDICINE

## 2020-06-22 PROCEDURE — 0DB68ZX EXCISION OF STOMACH, VIA NATURAL OR ARTIFICIAL OPENING ENDOSCOPIC, DIAGNOSTIC: ICD-10-PCS | Performed by: INTERNAL MEDICINE

## 2020-06-22 PROCEDURE — 7100000011 HC PHASE II RECOVERY - ADDTL 15 MIN: Performed by: INTERNAL MEDICINE

## 2020-06-22 PROCEDURE — 80048 BASIC METABOLIC PNL TOTAL CA: CPT

## 2020-06-22 PROCEDURE — 85014 HEMATOCRIT: CPT

## 2020-06-22 PROCEDURE — 2709999900 HC NON-CHARGEABLE SUPPLY: Performed by: INTERNAL MEDICINE

## 2020-06-22 PROCEDURE — 3609012400 HC EGD TRANSORAL BIOPSY SINGLE/MULTIPLE: Performed by: INTERNAL MEDICINE

## 2020-06-22 PROCEDURE — 36415 COLL VENOUS BLD VENIPUNCTURE: CPT

## 2020-06-22 PROCEDURE — 6360000002 HC RX W HCPCS: Performed by: NURSE ANESTHETIST, CERTIFIED REGISTERED

## 2020-06-22 RX ORDER — LIDOCAINE HYDROCHLORIDE 20 MG/ML
INJECTION, SOLUTION INFILTRATION; PERINEURAL PRN
Status: DISCONTINUED | OUTPATIENT
Start: 2020-06-22 | End: 2020-06-22 | Stop reason: SDUPTHER

## 2020-06-22 RX ORDER — SODIUM CHLORIDE, SODIUM LACTATE, POTASSIUM CHLORIDE, CALCIUM CHLORIDE 600; 310; 30; 20 MG/100ML; MG/100ML; MG/100ML; MG/100ML
INJECTION, SOLUTION INTRAVENOUS CONTINUOUS PRN
Status: DISCONTINUED | OUTPATIENT
Start: 2020-06-22 | End: 2020-06-22 | Stop reason: SDUPTHER

## 2020-06-22 RX ORDER — PROPOFOL 10 MG/ML
INJECTION, EMULSION INTRAVENOUS PRN
Status: DISCONTINUED | OUTPATIENT
Start: 2020-06-22 | End: 2020-06-22 | Stop reason: SDUPTHER

## 2020-06-22 RX ADMIN — LIDOCAINE HYDROCHLORIDE 80 MG: 20 INJECTION, SOLUTION INFILTRATION; PERINEURAL at 13:09

## 2020-06-22 RX ADMIN — SODIUM CHLORIDE, POTASSIUM CHLORIDE, SODIUM LACTATE AND CALCIUM CHLORIDE: 600; 310; 30; 20 INJECTION, SOLUTION INTRAVENOUS at 21:07

## 2020-06-22 RX ADMIN — Medication 1 LOZENGE: at 08:28

## 2020-06-22 RX ADMIN — ATORVASTATIN CALCIUM 10 MG: 10 TABLET, FILM COATED ORAL at 21:07

## 2020-06-22 RX ADMIN — PANTOPRAZOLE SODIUM 40 MG: 40 INJECTION, POWDER, FOR SOLUTION INTRAVENOUS at 21:07

## 2020-06-22 RX ADMIN — TRAMADOL HYDROCHLORIDE 50 MG: 50 TABLET, FILM COATED ORAL at 03:31

## 2020-06-22 RX ADMIN — MONTELUKAST 10 MG: 10 TABLET, FILM COATED ORAL at 21:07

## 2020-06-22 RX ADMIN — PANTOPRAZOLE SODIUM 40 MG: 40 INJECTION, POWDER, FOR SOLUTION INTRAVENOUS at 08:29

## 2020-06-22 RX ADMIN — SODIUM CHLORIDE, POTASSIUM CHLORIDE, SODIUM LACTATE AND CALCIUM CHLORIDE: 600; 310; 30; 20 INJECTION, SOLUTION INTRAVENOUS at 10:03

## 2020-06-22 RX ADMIN — Medication 10 ML: at 21:07

## 2020-06-22 RX ADMIN — PROPOFOL 100 MG: 10 INJECTION, EMULSION INTRAVENOUS at 13:09

## 2020-06-22 RX ADMIN — SODIUM CHLORIDE, POTASSIUM CHLORIDE, SODIUM LACTATE AND CALCIUM CHLORIDE: 600; 310; 30; 20 INJECTION, SOLUTION INTRAVENOUS at 13:03

## 2020-06-22 ASSESSMENT — PAIN SCALES - GENERAL
PAINLEVEL_OUTOF10: 0
PAINLEVEL_OUTOF10: 5
PAINLEVEL_OUTOF10: 0

## 2020-06-22 NOTE — FLOWSHEET NOTE
06/21/20 2044   Assessment   Charting Type Shift assessment   Neurological   Neuro (WDL) WDL   Level of Consciousness 0   [place] Coma Scale   Eye Opening 4   Best Verbal Response 5   Best Motor Response 6   [place] Coma Scale Score 15   HEENT   HEENT (WDL) X   Right Eye Impaired vision   Left Eye Impaired vision   Teeth Dentures upper;Dentures lower  (At home)   Right Ear Impaired hearing   Left Ear Impaired hearing   Respiratory   Respiratory (WDL) WDL   Respiratory Pattern Regular   Respiratory Depth Normal   Respiratory Quality/Effort Unlabored   Chest Assessment Chest expansion symmetrical   L Breath Sounds Diminished;Clear   R Breath Sounds Diminished;Clear   Cardiac   Cardiac (WDL) WDL   Cardiac Regularity Regular   Cardiac Rhythm NSR   Cardiac Monitor   Telemetry Monitor On Yes   Telemetry Audible Yes   Telemetry Alarms Set Yes   Telemetry Box Number 4   Gastrointestinal   Abdominal (WDL) X   Abdomen Inspection Rounded; Soft   Tenderness Soft;Tenderness; No guarding   RUQ Bowel Sounds Hypoactive   LUQ Bowel Sounds Active   RLQ Bowel Sounds Hypoactive   LLQ Bowel Sounds Active   Peripheral Vascular   Peripheral Vascular (WDL) X   Edema Right lower extremity; Left lower extremity   RLE Edema +1   LLE Edema +1   RUE Neurovascular Assessment   Capillary Refill Less than/equal to 3 seconds   Color Appropriate for ethnicity   Temperature Warm   LUE Neurovascular Assessment   Capillary Refill Less than/equal to 3 seconds   Color Appropriate for ethnicity   Temperature Warm   RLE Neurovascular Assessment   Capillary Refill Less than/equal to 3 seconds   Color Appropriate for ethnicity   Temperature Warm   R Pedal Pulse +1   LLE Neurovascular Assessment   Capillary Refill Less than/equal to 3 seconds   Color Appropriate for ethnicity   Temperature Warm   L Pedal Pulse +1   Skin Color/Condition   Skin Color/Condition (WDL) X   Skin Integrity   Skin Integrity (WDL) WDL   Musculoskeletal   Musculoskeletal (WDL) WDL

## 2020-06-22 NOTE — PROGRESS NOTES
Pt advanced from lying to sitting at side of bed without adverse events: VSS/RESP WNL  abd assessment unchanged- denies pain

## 2020-06-22 NOTE — OP NOTE
Esophagogastroduodenoscopy Note    Patient:   Monique Bhagat    YOB: 1940    Facility:   [facility] [Inpatient]   Referring/PCP: Mildred PONCE    Procedure:   Esophagogastroduodenoscopy --diagnostic  Date:     6/22/2020   Endoscopist:  Elaine Harman     Preoperative Diagnosis: hematochezia H/H 6.5/19. Colonoscopy revealed dark blood   Postoperative Diagnosis: , most likely NSAID-induced    Anesthesia: Propofol  Estimated blood loss: Estimated amount of blood loss is <1ml. Complications: None    Description of Procedure:  Informed consent was obtained from the patient after explanation of the procedure including indications, description of the procedure,  benefits and possible risks and complications of the procedure, and alternatives. Questions were answered. The patient's history was reviewed and a directed physical examination was performed prior to the procedure. Patient was monitored throughout the procedure with pulse oximetry and periodic assessment of vital signs. Patient was sedated as noted above. The Nursing staff and I performed a time out. With the patient in the left lateral decubitus position, the Olympus videoendoscope was placed in the patient's mouth and under direct visualization passed into the esophagus. The scope was ultimately passed to the third portion of the duodenum. Visualization was performed during both introduction and withdrawal of the endoscope and retroflexed view of the proximal stomach was obtained. Findings[de-identified]   Esophagus: normal. The findings do not support a diagnosis of Puri's Esophagus. Stomach: /P gastric bypass w a small 8 mm white based distal gastric ulcer. Stomach biopsied for H. Pylori. Duodenum: normal    Recommendations: -Acid suppression with a proton pump inhibitor. , -Avoid NSAID's    Elaine Harman MD       (O) [phone]          Elaine Harman MD

## 2020-06-22 NOTE — CARE COORDINATION
CASE MANAGEMENT INITIAL ASSESSMENT      Reviewed chart and completed assessment with: patient   Explained Case Management role/services. Primary contact information: [place] [phone]    Admit date/status: 6/20/20 Inpatient   Diagnosis: lower GI bleed   Is this a Readmission?: no    Insurance: Medicare, [employer]   Precert required for SNF -  N        3 night stay required - Y    Living arrangements, Adls, care needs, prior to admission: lives in a house with his wife and son    Transportation: patient     Durable Medical Equipment at home: Walker__Cane__RTS__ BSC__Shower Chair__  02__ HHN_X_ CPAP__  BiPap__  Hospital Bed__ W/C___ Other_grab bars _________    Services in the home and/or outpatient, prior to admission: none    Dialysis Facility (if applicable)   · Name:  · Address:  · Dialysis Schedule:  · Phone:  · Fax:    PT/OT recs: none    Hospital Exemption Notification (HEN): not initiated     Barriers to discharge: none    Plan/comments: Patient is independent at home. He denies any need at this time. Please notify CM should any needs arise.

## 2020-06-22 NOTE — H&P
Pre-sedation Assessment    History and Physical / Pre-Sedation Assessment  Patient:  Marvin Esposito   :   1940     Intended Procedure: EGD      HPI: 79 Y M with a h/o HTN, HLD, CAD s/p CABG and s/p gastric  bypass presents w hematochezia. H/H 6..  Colonoscopy only revealed dark blood throughout the colon and cecum indicative of possible UGI or SI bleed    Current Facility-Administered Medications   Medication Dose Route Frequency Provider Last Rate Last Dose    calcium carbonate (TUMS) chewable tablet 1,000 mg  1,000 mg Oral TID PRN Lay Helm MD   500 mg at 20 0806    pantoprazole (PROTONIX) injection 40 mg  40 mg Intravenous BID Kathleen Stovall MD   40 mg at 20 0829    benzocaine-menthol (CEPACOL SORE THROAT) lozenge 1 lozenge  1 lozenge Oral Q2H PRN Allegra Sniff, APRN - NP   1 lozenge at 20 0828    albuterol (PROVENTIL) nebulizer solution 2.5 mg  2.5 mg Nebulization Q6H PRN Lay Helm MD        vitamin C (ASCORBIC ACID) tablet 500 mg  500 mg Oral Daily Lay Helm MD   500 mg at 20 1203    levothyroxine (SYNTHROID) tablet 50 mcg  50 mcg Oral Daily Lay Helm MD        atorvastatin (LIPITOR) tablet 10 mg  10 mg Oral Nightly Lay Helm MD   10 mg at 20    montelukast (SINGULAIR) tablet 10 mg  10 mg Oral Nightly Lay Helm MD   10 mg at 20    therapeutic multivitamin-minerals 1 tablet  1 tablet Oral Daily Lay Helm MD   1 tablet at 20 1202    sodium chloride flush 0.9 % injection 10 mL  10 mL Intravenous 2 times per day Lay Helm MD   10 mL at 20    sodium chloride flush 0.9 % injection 10 mL  10 mL Intravenous PRN Lay Helm MD        acetaminophen (TYLENOL) tablet 650 mg  650 mg Oral Q6H PRN Lay Helm MD        Or    acetaminophen (TYLENOL) suppository 650 mg  650 mg Rectal Q6H PRN Lay Helm MD        polyethylene glycol (GLYCOLAX) packet 17 g  17 g Oral Daily PRN Johny Sandifer, MD        prochlorperazine (COMPAZINE) injection 10 mg  10 mg Intravenous Q6H PRN Johny Sandifer, MD   10 mg at 06/21/20 0456    lactated ringers infusion   Intravenous Continuous Johny Sandifer,  mL/hr at 06/22/20 1003      traMADol (ULTRAM) tablet 50 mg  50 mg Oral Q6H PRN Johny Sandifer, MD   50 mg at 06/22/20 0331     Past Medical History:   Diagnosis Date    Arthritis     Asthma     COPD    CAD (coronary artery disease)     S/P CABG    Gastric bypass status for obesity     Hyperlipidemia     Hypertension     Low kidney function     Osteoarthritis     Thyroid disease      Past Surgical History:   Procedure Laterality Date    COLECTOMY  2003    COLONOSCOPY  5/11    diverticulosis    COLONOSCOPY  5/6/2016    Colon Ulcer    COLONOSCOPY N/A 6/21/2020    COLONOSCOPY DIAGNOSTIC performed by Elaine Harman MD at [address] [place] [address]  2007    3 VESSEL    COSMETIC SURGERY      removed skin from stomach    FOOT SURGERY Bilateral     toenails removed    GASTRIC BYPASS SURGERY  2001    HERNIA REPAIR  2169    umbilical    NASAL SEPTUM SURGERY  1995    SHOULDER ARTHROSCOPY Right 9/8/15    rtc repair    THROAT SURGERY  1995    vocal cord        Nurses notes reviewed and agreed. Medications reviewed  Allergies: Allergies   Allergen Reactions    Seasonal      Pollen    Clindamycin Hcl Rash           Physical Exam:  Vital Signs: /71   Pulse 95   Temp 98 °F (36.7 °C) (Oral)   Resp 17   Ht 5' 7\" (1.702 m)   Wt 230 lb (104.3 kg)   SpO2 97%   BMI 36.02 kg/m²  Body mass index is 36.02 kg/m².   Airway:Normal  Cardiac:Normal  Pulmonary:Normal  Abdomen:Normal  Specific to procedure: none      Pre-Procedure Assessment/Plan:  ASA 3 - Patient with moderate systemic disease with functional limitations  Malampatti II  Level of Sedation Plan:Deep sedation    Post Procedure plan: Return to same level of care    I assessed the patient and find that

## 2020-06-22 NOTE — PROGRESS NOTES
Hospitalist Progress Note      PCP: Anibal PONCE    Date of Admission: 6/20/2020    Chief Complaint:  BRBPR     History Of Present Illness: The patient is a pleasant 79 Y M with a h/o HTN, HLD, CAD s/p CABG. He used to weigh over 400 lbs and had a gastric bypass decades ago, which was then complicated by a strangulated bowel and a partial colectomy in 2003. He has been feeling a little tired and unable to exert himself for the last week or so. He was lightheaded yesterday. He rarely looks at his BM's, usually just flushes immediately. Last night he happened to look and it was bright red. He then started to have diarrhea, he estimates about 11x since yesterday. He has mild, chronic abdominal pain which he dismisses as gas - it feels the same as always. No fevers or chills. No vomiting, no sick contacts. He has never had GIB before, says his colonoscopy a couple years ago was unremarkable. Subjective:  He is doing well. Has had two brown BMs without blood.         Medications:  Reviewed    Infusion Medications    lactated ringers 100 mL/hr at 06/22/20 1003     Scheduled Medications    pantoprazole  40 mg Intravenous BID    vitamin C  500 mg Oral Daily    levothyroxine  50 mcg Oral Daily    atorvastatin  10 mg Oral Nightly    montelukast  10 mg Oral Nightly    therapeutic multivitamin-minerals  1 tablet Oral Daily    sodium chloride flush  10 mL Intravenous 2 times per day     PRN Meds: calcium carbonate, benzocaine-menthol, albuterol, sodium chloride flush, acetaminophen **OR** acetaminophen, polyethylene glycol, prochlorperazine, traMADol      Intake/Output Summary (Last 24 hours) at 6/22/2020 1453  Last data filed at 6/22/2020 1316  Gross per 24 hour   Intake 3568.5 ml   Output 300 ml   Net 3268.5 ml       Physical Exam Performed:    BP (!) 104/50   Pulse 88   Temp 98.1 °F (36.7 °C) (Temporal)   Resp 20   Ht 5' 7\" (1.702 m)   Wt 230 lb (104.3 kg)   SpO2 100%   BMI 36.02 kg/m²     General appearance: No apparent distress, appears stated age and cooperative. HEENT: Pupils equal, round, and reactive to light. Conjunctivae/corneas clear. Neck: Supple, with full range of motion. No jugular venous distention. Trachea midline. Respiratory:  Normal respiratory effort. Clear to auscultation, bilaterally without Rales/Wheezes/Rhonchi. Cardiovascular: Regular rate and rhythm with normal S1/S2 without murmurs, rubs or gallops. Abdomen: Soft, non-tender, non-distended with normal bowel sounds. Musculoskeletal: No clubbing, cyanosis or edema bilaterally. Full range of motion without deformity. Skin: Skin texture, turgor normal.  No rashes or lesions. Less pale. Small oozing dehiscence wound on abdomen which has been stable for years. Neurologic:  Neurovascularly intact without any focal sensory/motor deficits. Cranial nerves: II-XII intact, grossly non-focal.  Psychiatric: Alert and oriented, thought content appropriate, normal insight  Capillary Refill: Brisk,< 3 seconds   Peripheral Pulses: +2 palpable, equal bilaterally       Labs:   Recent Labs     06/20/20  0725  06/21/20  0528  06/21/20 2013 06/22/20  0118 06/22/20  0455   WBC 6.4  --  6.1  --   --   --  8.6   HGB 10.4*   < > 6.5*   < > 6.8* 7.8* 7.4*   HCT 31.0*   < > 19.4*   < > 20.4* 23.2* 22.3*     --  120*  --   --   --  122*    < > = values in this interval not displayed. Recent Labs     06/20/20  0725 06/21/20  0528 06/22/20  0455    134* 138   K 5.0 4.8 4.5    105 109   CO2 25 20* 21   BUN 44* 47* 40*   CREATININE 2.4* 1.9* 1.9*   CALCIUM 8.8 7.7* 7.7*     Recent Labs     06/20/20  0725   AST 20   ALT 14   BILITOT 0.5   ALKPHOS 70     Recent Labs     06/20/20  1257   INR 1.18*     No results for input(s): CKTOTAL, TROPONINI in the last 72 hours.     Urinalysis:      Lab Results   Component Value Date    NITRU NEGATIVE 01/31/2012    WBCUA Rare 01/31/2012    BACTERIA 2+ 01/31/2012    RBCUA 3-5

## 2020-06-22 NOTE — ANESTHESIA PRE PROCEDURE
Department of Anesthesiology  Preprocedure Note       Name:  Juanito Crowell   Age:  79 y.o.  :  1940                                          MRN:  [id number]         Date:  2020      Surgeon: Dana Arias):  Isabel Lamb MD    Procedure: Procedure(s):  EGD ESOPHAGOGASTRODUODENOSCOPY    Medications prior to admission:   Prior to Admission medications    Medication Sig Start Date End Date Taking? Authorizing Provider   Cholecalciferol (VITAMIN D-3 PO) Take by mouth   Yes Historical Provider, MD   Coenzyme Q10 (COQ10 PO) Take by mouth   Yes Historical Provider, MD   aspirin 325 MG tablet Take 325 mg by mouth daily Pt takes 4 a day   Yes Historical Provider, MD   Biotin 5000 MCG TABS Take by mouth   Yes Historical Provider, MD   lovastatin (MEVACOR) 20 MG tablet Take 20 mg by mouth nightly   Yes Historical Provider, MD   BACLOFEN PO Take by mouth   Yes Historical Provider, MD   Multiple Vitamins-Minerals (THERAPEUTIC MULTIVITAMIN-MINERALS) tablet Take 1 tablet by mouth daily   Yes Historical Provider, MD   Cyanocobalamin (VITAMIN B-12 CR PO) Take by mouth daily   Yes Historical Provider, MD   Ascorbic Acid (VITAMIN C) 500 MG tablet Take 500 mg by mouth daily   Yes Historical Provider, MD   albuterol (PROVENTIL) (2.5 MG/3ML) 0.083% nebulizer solution Take 2.5 mg by nebulization every 6 hours as needed for Wheezing   Yes Historical Provider, MD   nitroGLYCERIN (NITROSTAT) 0.4 MG SL tablet Place 0.4 mg under the tongue every 5 minutes as needed. Yes Historical Provider, MD   metoprolol (LOPRESSOR) 50 MG tablet Take 50 mg by mouth 2 times daily. Yes Historical Provider, MD   levothyroxine (SYNTHROID) 100 MCG tablet Take 50 mcg by mouth daily. Yes Historical Provider, MD   montelukast (SINGULAIR) 10 MG tablet Take 10 mg by mouth nightly. Yes Historical Provider, MD   albuterol (PROVENTIL HFA;VENTOLIN HFA) 108 (90 BASE) MCG/ACT inhaler Inhale 2 puffs into the lungs every 6 hours as needed.      Yes

## 2020-06-23 VITALS
HEART RATE: 93 BPM | TEMPERATURE: 98.1 F | BODY MASS INDEX: 37.43 KG/M2 | HEIGHT: 67 IN | DIASTOLIC BLOOD PRESSURE: 58 MMHG | OXYGEN SATURATION: 93 % | WEIGHT: 238.5 LBS | SYSTOLIC BLOOD PRESSURE: 119 MMHG | RESPIRATION RATE: 20 BRPM

## 2020-06-23 LAB
ANION GAP SERPL CALCULATED.3IONS-SCNC: 5 MMOL/L (ref 3–16)
BLOOD BANK DISPENSE STATUS: NORMAL
BLOOD BANK PRODUCT CODE: NORMAL
BPU ID: NORMAL
BUN BLDV-MCNC: 31 MG/DL (ref 7–20)
CALCIUM SERPL-MCNC: 7.6 MG/DL (ref 8.3–10.6)
CHLORIDE BLD-SCNC: 109 MMOL/L (ref 99–110)
CO2: 24 MMOL/L (ref 21–32)
CREAT SERPL-MCNC: 1.9 MG/DL (ref 0.8–1.3)
DESCRIPTION BLOOD BANK: NORMAL
GFR AFRICAN AMERICAN: 42
GFR NON-AFRICAN AMERICAN: 34
GLUCOSE BLD-MCNC: 121 MG/DL (ref 70–99)
HCT VFR BLD CALC: 22.8 % (ref 40.5–52.5)
HEMOGLOBIN: 7.8 G/DL (ref 13.5–17.5)
MCH RBC QN AUTO: 32 PG (ref 26–34)
MCHC RBC AUTO-ENTMCNC: 34.2 G/DL (ref 31–36)
MCV RBC AUTO: 93.8 FL (ref 80–100)
PDW BLD-RTO: 16.5 % (ref 12.4–15.4)
PLATELET # BLD: 105 K/UL (ref 135–450)
PMV BLD AUTO: 8.5 FL (ref 5–10.5)
POTASSIUM REFLEX MAGNESIUM: 4.6 MMOL/L (ref 3.5–5.1)
RBC # BLD: 2.44 M/UL (ref 4.2–5.9)
SODIUM BLD-SCNC: 138 MMOL/L (ref 136–145)
WBC # BLD: 4.6 K/UL (ref 4–11)

## 2020-06-23 PROCEDURE — 6360000002 HC RX W HCPCS: Performed by: INTERNAL MEDICINE

## 2020-06-23 PROCEDURE — 36430 TRANSFUSION BLD/BLD COMPNT: CPT

## 2020-06-23 PROCEDURE — 2580000003 HC RX 258: Performed by: INTERNAL MEDICINE

## 2020-06-23 PROCEDURE — 85027 COMPLETE CBC AUTOMATED: CPT

## 2020-06-23 PROCEDURE — 6370000000 HC RX 637 (ALT 250 FOR IP): Performed by: NURSE PRACTITIONER

## 2020-06-23 PROCEDURE — 80048 BASIC METABOLIC PNL TOTAL CA: CPT

## 2020-06-23 PROCEDURE — 36415 COLL VENOUS BLD VENIPUNCTURE: CPT

## 2020-06-23 PROCEDURE — C9113 INJ PANTOPRAZOLE SODIUM, VIA: HCPCS | Performed by: INTERNAL MEDICINE

## 2020-06-23 PROCEDURE — 6370000000 HC RX 637 (ALT 250 FOR IP): Performed by: INTERNAL MEDICINE

## 2020-06-23 PROCEDURE — 2580000003 HC RX 258: Performed by: NURSE PRACTITIONER

## 2020-06-23 RX ORDER — PANTOPRAZOLE SODIUM 40 MG/1
40 TABLET, DELAYED RELEASE ORAL
Qty: 30 TABLET | Refills: 0 | Status: ON HOLD | OUTPATIENT
Start: 2020-06-23 | End: 2021-08-20 | Stop reason: SDUPTHER

## 2020-06-23 RX ORDER — ASPIRIN 81 MG/1
81 TABLET ORAL DAILY
Qty: 90 TABLET | Refills: 1 | Status: SHIPPED | OUTPATIENT
Start: 2020-06-23

## 2020-06-23 RX ORDER — POLYVINYL ALCOHOL 14 MG/ML
1 SOLUTION/ DROPS OPHTHALMIC PRN
Status: DISCONTINUED | OUTPATIENT
Start: 2020-06-23 | End: 2020-06-23 | Stop reason: HOSPADM

## 2020-06-23 RX ORDER — 0.9 % SODIUM CHLORIDE 0.9 %
20 INTRAVENOUS SOLUTION INTRAVENOUS ONCE
Status: COMPLETED | OUTPATIENT
Start: 2020-06-23 | End: 2020-06-23

## 2020-06-23 RX ADMIN — LEVOTHYROXINE SODIUM 50 MCG: 0.05 TABLET ORAL at 05:27

## 2020-06-23 RX ADMIN — OXYCODONE HYDROCHLORIDE AND ACETAMINOPHEN 500 MG: 500 TABLET ORAL at 08:36

## 2020-06-23 RX ADMIN — Medication 10 ML: at 08:36

## 2020-06-23 RX ADMIN — SODIUM CHLORIDE 250 ML: 9 INJECTION, SOLUTION INTRAVENOUS at 00:59

## 2020-06-23 RX ADMIN — Medication 1 LOZENGE: at 10:55

## 2020-06-23 RX ADMIN — Medication 10 ML: at 00:58

## 2020-06-23 RX ADMIN — Medication 1 TABLET: at 08:36

## 2020-06-23 RX ADMIN — PANTOPRAZOLE SODIUM 40 MG: 40 INJECTION, POWDER, FOR SOLUTION INTRAVENOUS at 08:36

## 2020-06-23 ASSESSMENT — PAIN SCALES - GENERAL
PAINLEVEL_OUTOF10: 0

## 2020-06-23 NOTE — PLAN OF CARE
Problem: Falls - Risk of:  Goal: Will remain free from falls  Description: Will remain free from falls  Outcome: Ongoing  Note: Pt will remain free of falls this shift. Bedside table and call light within reach. Pt instructed to call out when in need of assistance, verbalized understanding. Will continue to monitor.     Goal: Absence of physical injury  Description: Absence of physical injury  Outcome: Ongoing     Problem: Bleeding:  Goal: Will show no signs and symptoms of excessive bleeding  Description: Will show no signs and symptoms of excessive bleeding  Outcome: Ongoing

## 2020-06-23 NOTE — FLOWSHEET NOTE
06/23/20 1417   Encounter Summary   Services provided to: Patient not available   Referral/Consult From: [facility]   Support System Spouse; Children   [facility]   Continue Visiting   (6/23 Tel.-no answer;prayed silently for healing)

## 2020-06-23 NOTE — PLAN OF CARE
Problem: Falls - Risk of:  Goal: Will remain free from falls  Description: Will remain free from falls  6/23/2020 1427 by Francisco Serrano RN  Outcome: Ongoing  Note: Patient understands how to use call light. Instructed to call out for assistance. Will continue to monitor. Fall risk precautions in place. 6/23/2020 0125 by Leny Renae RN  Outcome: Ongoing  Note: Pt will remain free of falls this shift. Bedside table and call light within reach. Pt instructed to call out when in need of assistance, verbalized understanding. Will continue to monitor. Goal: Absence of physical injury  Description: Absence of physical injury  6/23/2020 0125 by Leny Renae RN  Outcome: Ongoing     Problem: Bleeding:  Goal: Will show no signs and symptoms of excessive bleeding  Description: Will show no signs and symptoms of excessive bleeding  6/23/2020 1427 by Francisco Serrano RN  Outcome: Ongoing  Note: VSS.  Latest labs reviewed and seen by the MD.     6/23/2020 0125 by Leny Renae RN  Outcome: Ongoing

## 2020-06-23 NOTE — PROGRESS NOTES
Corry Dawn is a 79 y.o. male patient.     Current Facility-Administered Medications   Medication Dose Route Frequency Provider Last Rate Last Dose    polyvinyl alcohol (LIQUIFILM TEARS) 1.4 % ophthalmic solution 1 drop  1 drop Both Eyes PRN Terrence Cheadle, MD        calcium carbonate (TUMS) chewable tablet 1,000 mg  1,000 mg Oral TID PRN Terrence Cheadle, MD   500 mg at 06/21/20 0806    pantoprazole (PROTONIX) injection 40 mg  40 mg Intravenous BID Daniel Mares MD   40 mg at 06/23/20 0836    benzocaine-menthol (CEPACOL SORE THROAT) lozenge 1 lozenge  1 lozenge Oral Q2H PRN EDUARDO Salmon - NP   1 lozenge at 06/23/20 1055    albuterol (PROVENTIL) nebulizer solution 2.5 mg  2.5 mg Nebulization Q6H PRN Seven Cheadle, MD        vitamin C (ASCORBIC ACID) tablet 500 mg  500 mg Oral Daily Seven Cheadle, MD   500 mg at 06/23/20 0836    levothyroxine (SYNTHROID) tablet 50 mcg  50 mcg Oral Daily Seven Cheadle, MD   50 mcg at 06/23/20 0527    atorvastatin (LIPITOR) tablet 10 mg  10 mg Oral Nightly Terrence Cheadle, MD   10 mg at 06/22/20 2107    montelukast (SINGULAIR) tablet 10 mg  10 mg Oral Nightly Seven Cheadle, MD   10 mg at 06/22/20 2107    therapeutic multivitamin-minerals 1 tablet  1 tablet Oral Daily Terrence Cheadle, MD   1 tablet at 06/23/20 0836    sodium chloride flush 0.9 % injection 10 mL  10 mL Intravenous 2 times per day Terrence Cheadle, MD   10 mL at 06/23/20 0836    sodium chloride flush 0.9 % injection 10 mL  10 mL Intravenous PRN Terrence Cheadle, MD   10 mL at 06/23/20 0058    acetaminophen (TYLENOL) tablet 650 mg  650 mg Oral Q6H PRN Terrence Cheadle, MD        Or    acetaminophen (TYLENOL) suppository 650 mg  650 mg Rectal Q6H PRN Terrence Cheadle, MD        polyethylene glycol (GLYCOLAX) packet 17 g  17 g Oral Daily PRN Terrence Cheadle, MD        prochlorperazine (COMPAZINE) injection 10 mg  10 mg Intravenous Q6H PRN Terrence Cheadle, MD   10 mg at 06/21/20 8584    lactated ringers infusion   Intravenous Continuous Destinee Cooley MD 75 mL/hr at 06/23/20 0529      traMADol (ULTRAM) tablet 50 mg  50 mg Oral Q6H PRN Destinee Cooley MD   50 mg at 06/22/20 0331     Allergies   Allergen Reactions    Seasonal      Pollen    Clindamycin Hcl Rash     Active Problems:    Lower GI bleed  Resolved Problems:    * No resolved hospital problems. *    Blood pressure (!) 119/58, pulse 93, temperature 98.1 °F (36.7 °C), temperature source Oral, resp. rate 20, height 5' 7\" (1.702 m), weight 238 lb 8 oz (108.2 kg), SpO2 93 %. Subjective:  Symptoms:  Stable. Pain:  He reports no pain. Objective:  General Appearance: In no acute distress and not in pain. Vital signs: (most recent): Blood pressure (!) 119/58, pulse 93, temperature 98.1 °F (36.7 °C), temperature source Oral, resp. rate 20, height 5' 7\" (1.702 m), weight 238 lb 8 oz (108.2 kg), SpO2 93 %. Abdomen: Abdomen is soft. Bowel sounds are normal.   There is no abdominal tenderness. Assessment & Plan  79 Y M with a h/o HTN, HLD, CAD s/p CABG and s/p gastric bypass presents w hematochezia w neg. Colonoscopy, and a  on EGD (most likely due to taking 6 ASA's/day for his knee)     Plan:   1. F/U H/H  2. Await path  3. OK to D/C home on PPI and off NSAID's  4.  Will follow     Isabel Lamb MD       (O) [phone]    Isabel Lamb MD  6/23/2020

## 2020-07-07 ENCOUNTER — APPOINTMENT (OUTPATIENT)
Dept: GENERAL RADIOLOGY | Age: 80
End: 2020-07-07
Payer: MEDICARE

## 2020-07-07 ENCOUNTER — APPOINTMENT (OUTPATIENT)
Dept: CT IMAGING | Age: 80
End: 2020-07-07
Payer: MEDICARE

## 2020-07-07 ENCOUNTER — HOSPITAL ENCOUNTER (EMERGENCY)
Age: 80
Discharge: HOME | End: 2020-07-08
Payer: MEDICARE

## 2020-07-07 VITALS
SYSTOLIC BLOOD PRESSURE: 123 MMHG | WEIGHT: 238 LBS | BODY MASS INDEX: 36.07 KG/M2 | RESPIRATION RATE: 18 BRPM | HEIGHT: 68 IN | HEART RATE: 51 BPM | TEMPERATURE: 98.1 F | DIASTOLIC BLOOD PRESSURE: 64 MMHG | OXYGEN SATURATION: 96 %

## 2020-07-07 PROCEDURE — 73110 X-RAY EXAM OF WRIST: CPT

## 2020-07-07 PROCEDURE — 6370000000 HC RX 637 (ALT 250 FOR IP): Performed by: NURSE PRACTITIONER

## 2020-07-07 PROCEDURE — 73030 X-RAY EXAM OF SHOULDER: CPT

## 2020-07-07 PROCEDURE — 72125 CT NECK SPINE W/O DYE: CPT

## 2020-07-07 PROCEDURE — 70450 CT HEAD/BRAIN W/O DYE: CPT

## 2020-07-07 PROCEDURE — 12013 RPR F/E/E/N/L/M 2.6-5.0 CM: CPT

## 2020-07-07 PROCEDURE — 99284 EMERGENCY DEPT VISIT MOD MDM: CPT

## 2020-07-07 RX ORDER — HYDROCODONE BITARTRATE AND ACETAMINOPHEN 5; 325 MG/1; MG/1
1 TABLET ORAL ONCE
Status: COMPLETED | OUTPATIENT
Start: 2020-07-07 | End: 2020-07-07

## 2020-07-07 RX ORDER — HYDROCODONE BITARTRATE AND ACETAMINOPHEN 5; 325 MG/1; MG/1
1 TABLET ORAL EVERY 6 HOURS PRN
Qty: 20 TABLET | Refills: 0 | Status: SHIPPED | OUTPATIENT
Start: 2020-07-07 | End: 2020-07-10

## 2020-07-07 RX ADMIN — HYDROCODONE BITARTRATE AND ACETAMINOPHEN 1 TABLET: 5; 325 TABLET ORAL at 21:42

## 2020-07-07 RX ADMIN — HYDROCODONE BITARTRATE AND ACETAMINOPHEN 1 TABLET: 5; 325 TABLET ORAL at 23:09

## 2020-07-07 ASSESSMENT — PAIN SCALES - GENERAL
PAINLEVEL_OUTOF10: 8
PAINLEVEL_OUTOF10: 8
PAINLEVEL_OUTOF10: 6
PAINLEVEL_OUTOF10: 8
PAINLEVEL_OUTOF10: 4

## 2020-07-07 ASSESSMENT — PAIN DESCRIPTION - PAIN TYPE: TYPE: ACUTE PAIN

## 2020-07-07 ASSESSMENT — PAIN DESCRIPTION - LOCATION: LOCATION: SHOULDER;HEAD

## 2020-07-08 ASSESSMENT — PAIN SCALES - GENERAL: PAINLEVEL_OUTOF10: 4

## 2020-07-08 NOTE — ED NOTES
All discharge paperwork and follow-up instructions reviewed with pt. Pt verbalized understanding.  Pt discharged via wheelchair in stable condition with Son.       Pina Mancilla RN  07/08/20 0637

## 2020-07-08 NOTE — ED PROVIDER NOTES
Evaluated by [id number] [facility] Provider    [address] [facility]  ED       CHIEF COMPLAINT    Fall (pt fell up the steps hit shoulders/chest/head on wall, lac to forehead and nose, denies LOC ) and Laceration    HISTORY OF PRESENT ILLNESS  Christopher Rosario is a 79 y.o. male who presents to the ED complaining of fall/head injury. This injury occurred: just prior to arrival in ED. States he tripped going up steps on his flip flops. Patient is also reporting pain into his bilateral shoulders as well as his right wrist around the area of the thumb. Denies pain into his neck, chest, back, abdomen. Denies LOC or vomiting at the time of or since the injury. Denies visual disturbances or changes. Denies headache. Does not take blood thinning medications. Tetanus vaccine is up to date last dose 2017. The patient is currently rating their pain as 8/10 and describes it as an aching type of pain. Treatments tried prior to arrival in the ED: None. The patient denies other complaints, modifying factors or associated symptoms. The patient arrived to the ED via private car. Patient is accompanied by son who is bedside for the visit.     PAST MEDICAL HISTORY    Past Medical History:   Diagnosis Date    Arthritis     Asthma     COPD    CAD (coronary artery disease)     S/P CABG    Gastric bypass status for obesity     Hyperlipidemia     Hypertension     Low kidney function     Osteoarthritis     Thyroid disease        SURGICAL HISTORY    Past Surgical History:   Procedure Laterality Date    COLECTOMY  2003    COLONOSCOPY  5/11    diverticulosis    COLONOSCOPY  5/6/2016    Colon Ulcer    COLONOSCOPY N/A 6/21/2020    COLONOSCOPY DIAGNOSTIC performed by Savage Zarate MD at [facility]  2007    3 VESSEL    COSMETIC SURGERY      removed skin from stomach    FOOT SURGERY Bilateral     toenails removed    GASTRIC BYPASS SURGERY  2001    HERNIA REPAIR  2012 eyes or battles sign observed. PERRL. EOMI. Conjunctiva appear normal.  External ears are normal.  Bilateral TM are without erythema and are not bulging. There is no evidence of rupture or hemotympanum. There is no facial asymmetry. Tongue is midline, uvula is midline. Posterior oropharynx is without edema, erythema, or exudate. NECK: Supple with normal ROM. Trachea midline. There is no tenderness to palpation of the cervical midline spine. LUNGS:  Normal work of breathing. Speaking comfortably in full sentences. Breath sounds clear throughout all lung fields. CADIOVASCULAR:  Regular rate and rhythm. S1/S2 normal, no murmurs, rubs, or gallops on exam. Peripheral pulses are symmetric and strong. GI: Nondistended. Soft, non-tender to palpation, bowel sounds active in all 4 quadrants, no hepatosplenomegaly. EXTREMITIES: There is generalized tenderness to palpation of the bilateral shoulders and upper back. There is also tenderness to palpation of the right wrist, right hand and fingers with grossly normal range of motion, sensation is intact to light touch. Right upper extremity distally vascularly intact. To all other extremities: Normal ROM, no edema, no tenderness, or deformity. Distal pulses palpable. No gross deformities or trauma noted. Moving all extremities equally and appropriately. SKIN: Warm/dry. Laceration to the left eyebrow with red wound bed and moderate amount of serosanguineous discharge. There is no slough observed. There is no periwound erythema, induration, fluctuance. There is mild edema into the periwound to the left forearm there is a skin tear that is superficial in depth and with serosanguineous discharge present. Wound bed is red and there is no periwound erythema, edema, induration, fluctuance. No rashes/lesions noted. PSYCHIATRIC: Patient is alert and oriented with normal affect  NEUROLOGIC: GCS is 15. Cranial nerves II-XII grossly intact.  Moves all extremities with equal strength. No gross sensory deficits. Answers questions/follows commands appropriately. Procedure  Left eyebrow laceration repair:  The procedure, hazards, and the alternatives were discussed. Patient had full decisional capacity, voiced understanding and gave verbal consent to proceed. 2% plain lidocaine mixed with 0.5% plain marcaine was used to obtain local anesthesia at the site of the wound. The wound was cleansed and irrigated and then prepped with antiseptic and draped in sterile fashion. The wound was explored to determine if there was any foreign body or debris. Then the wound was explored further to rule out tear to the galea or any bone involvement. Wound edges were closed with five-point 0 fast gut. 8 total sutures were placed. Repaired length of the wound is 3 cm. There were no complications during the procedure and overall patient tolerated it wellHemostasis was obtained and topical antibacterial ointment was applied. Nose laceration repair:  The procedure and the alternatives were discussed with the patient. Patient had full decisional capacity, voiced understanding and gave verbal consent to proceed. Tissue adhesive (cyanoacrylate) was used to close wound edges since there was no tension or gaping of the wound. The wound is 2 cm in length. Excellent cosmetic closure was obtained. Patient was given instructions not to touch area and to wait until the area dried before discharge. Patient instructed to avoid getting the wound wet and if wet to blot dried gently and not to wrap. Patient aware not to apply any ointments until Dermabond has completely worn off, which will be in about 5-6 days. LABS  No results found for this visit on 07/07/20.     RADIOLOGY    Xr Shoulder Right (min 2 Views)    Result Date: 7/7/2020  EXAMINATION: THREE XRAY VIEWS OF THE RIGHT SHOULDER; 3 XRAY VIEWS OF THE RIGHT WRIST 7/7/2020 6:39 pm COMPARISON: 07/31/2015 HISTORY: ORDERING SYSTEM PROVIDED HISTORY: Injury TECHNOLOGIST PROVIDED HISTORY: Reason for exam:->Injury Reason for Exam: Fall (pt fell up the steps hit shoulders/chest/head on wall, lac to forehead and nose, denies LOC ) Acuity: Acute Type of Exam: Initial; ORDERING SYSTEM PROVIDED HISTORY: injury TECHNOLOGIST PROVIDED HISTORY: Reason for exam:->injury Reason for Exam: Fall (pt fell up the steps hit shoulders/chest/head on wall, lac to forehead and nose, denies LOC ) Acuity: Acute Type of Exam: Initial FINDINGS: Right shoulder: There is moderate acromioclavicular degenerative hypertrophy. Enthesopathy changes are seen within the greater tuberosity. No acute fracture or dislocation is seen. Visualized right lung clear. Right wrist: The bones are demineralized. Scapholunate and lunotriquetral interosseous distances are within normal limits. Degenerative disease is noted within the radial aspect of the hand, greatest the 1st carpometacarpal joint where it is severe in degree. No acute soft tissue abnormalities are identified. Dense vascular calcifications are noted. Right shoulder: No acute abnormality identified. Right wrist: No acute abnormality identified. Xr Wrist Right (min 3 Views)    Result Date: 7/7/2020  EXAMINATION: THREE XRAY VIEWS OF THE RIGHT SHOULDER; 3 XRAY VIEWS OF THE RIGHT WRIST 7/7/2020 6:39 pm COMPARISON: 07/31/2015 HISTORY: ORDERING SYSTEM PROVIDED HISTORY: Injury TECHNOLOGIST PROVIDED HISTORY: Reason for exam:->Injury Reason for Exam: Fall (pt fell up the steps hit shoulders/chest/head on wall, lac to forehead and nose, denies LOC ) Acuity: Acute Type of Exam: Initial; ORDERING SYSTEM PROVIDED HISTORY: injury TECHNOLOGIST PROVIDED HISTORY: Reason for exam:->injury Reason for Exam: Fall (pt fell up the steps hit shoulders/chest/head on wall, lac to forehead and nose, denies LOC ) Acuity: Acute Type of Exam: Initial FINDINGS: Right shoulder: There is moderate acromioclavicular degenerative hypertrophy.  Enthesopathy changes Cervical Spine Wo Contrast    Result Date: 7/7/2020  EXAMINATION: CT OF THE CERVICAL SPINE WITHOUT CONTRAST 7/7/2020 9:55 pm TECHNIQUE: CT of the cervical spine was performed without the administration of intravenous contrast. Multiplanar reformatted images are provided for review. Dose modulation, iterative reconstruction, and/or weight based adjustment of the mA/kV was utilized to reduce the radiation dose to as low as reasonably achievable. COMPARISON: None. HISTORY: ORDERING SYSTEM PROVIDED HISTORY: fall TECHNOLOGIST PROVIDED HISTORY: Reason for exam:->fall Reason for Exam: Fall, hit head on wall, lac above LT eye midline Acuity: Acute Type of Exam: Initial FINDINGS: BONES/ALIGNMENT: There is no acute fracture or traumatic malalignment. There is slight reversal of the normal cervical lordosis. Vertebral body alignment is otherwise normal.  Cervical vertebral body heights are normal. DEGENERATIVE CHANGES: There are multilevel degenerative changes particularly degenerative disc disease with disc space narrowing, discogenic sclerosis and spondylosis at C4-C5 and C5-C6. There is facet arthropathy on the right at C2-C3. Degenerative changes on the left at C1-C2. SOFT TISSUES: There is no prevertebral soft tissue swelling. Calcified plaque in the carotid bifurcations. Multilevel degenerative changes with no acute abnormality of the cervical spine.      Nm Gi Blood Loss    Result Date: 6/21/2020  EXAMINATION: NUCLEAR MEDICINE GASTRIC BLEEDING STUDY 6/21/2020 TECHNIQUE: Following the intravenous injection of 26.7 mCi of 99 mTc-labeled RBC's, a flow study and standard images of the abdomen was obtained over a total period of 60 minutes COMPARISON: CT abdomen pelvis, 05/02/2011 HISTORY: ORDERING SYSTEM PROVIDED HISTORY: GI bleed TECHNOLOGIST PROVIDED HISTORY: Reason for exam:->GI bleed FINDINGS: Activity is seen within the blood pool, including the great vessels and heart, and to a lesser extent within the liver and spleen. Activity within the penis is also visualized. There was no abnormal accumulation of radioactivity in the abdomen to suggest active bleeding during study acquisition. No evidence of active GI bleeding during acquisition. RECOMMENDATIONS: If the patient shows hemodynamic signs of an active bleed in the next 20 hours, additional images can be acquired. Xr Shoulder Left (min 2 Views)    Result Date: 7/7/2020  EXAMINATION: THREE XRAY VIEWS OF THE LEFT SHOULDER 7/7/2020 9:39 pm COMPARISON: 09/15/2016 HISTORY: ORDERING SYSTEM PROVIDED HISTORY: Injury TECHNOLOGIST PROVIDED HISTORY: Reason for exam:->Injury Reason for Exam: Fall (pt fell up the steps hit shoulders/chest/head on wall, lac to forehead and nose, denies LOC ) Acuity: Acute Type of Exam: Initial FINDINGS: There is no fracture or malalignment. There is severe subacromial narrowing consistent with a rotator cuff tear. Acromioclavicular spurring is also present. The soft tissues are unremarkable. No fracture or malalignment. ED COURSE/MDM  Patient seen and evaluated. Old records reviewed. Diagnostic testing reviewed and results discussed. I have evaluated this patient. My supervising physician was available for consultation. Lety Christiansen presented to the ED today with above noted complaints. Physical exam reveals laceration to the left eyebrow and laceration to the nose. The patient is neurologically intact: GCS is 15, CN II-XII intact, no focal or lateralizing neurologic deficits on exam. The patient is without signs of basilar skull fracture. Based on [address] [place] [address] CT criteria a CT scan of the head was performed and showed no acute findings. CT cervical spine also obtained and without acute findings. Patient was also reporting pain into his bilateral shoulders and right wrist.  X-rays of the these 3 areas was obtained and without acute findings.   Patient will be placed into a right wrist Velcro thumb spica splint and was advised to follow-up with orthopedics if there is no improvement in his pain over the next 7 to 10 days. Orthopedic referral was provided. Patient's fall was mechanical and I do not feel he needs further work-up. He will be discharged with medications to help with pain control. Lacerations repaired per above procedure note. The patient was instructed on wound care, signs and symptoms of infection, when to return to a health care provider. The patient verbalized understanding and agreement with the plan. While in ED patient received   Medications   HYDROcodone-acetaminophen (NORCO) 5-325 MG per tablet 1 tablet (1 tablet Oral Given 7/7/20 2142)   HYDROcodone-acetaminophen (NORCO) 5-325 MG per tablet 1 tablet (1 tablet Oral Given 7/7/20 2309)       At this point I do not feel the patient requires further work up and it is reasonable to discharge the patient. A discussion was had with the patient regarding diagnosis, diagnostic testing results, treatment/ plan of care, and follow up. All questions were answered. Patient will follow up as directed for further evaluation/treatment. The patient was given strict return precautions as we discussed symptoms that would necessitate return to the ED. Patient will return to ED for new/worsening symptoms. The patient verbalized their understanding and agreement with the above plan. Please refer to AVS for further details of the discharge instructions. Patient was sent home with a prescription for below medication/s. I did [place] patient on appropriate use of these medication. New Prescriptions    HYDROCODONE-ACETAMINOPHEN (NORCO) 5-325 MG PER TABLET    Take 1 tablet by mouth every 6 hours as needed for Pain for up to 3 days. I estimate there is LOW risk for SUBARACHNOID HEMORRHAGE, MENINGITIS, INTRACRANIAL HEMORRHAGE, SUBDURAL HEMATOMA, OR STROKE, thus I consider the discharge disposition reasonable.  Phoebe Smith and I have discussed the diagnosis and risks, and we agree with discharging home to follow-up with their primary doctor. We also discussed returning to the Emergency Department immediately if new or worsening symptoms occur. We have discussed the symptoms which are most concerning (e.g., changing or worsening pain, weakness, vomiting, fever) that necessitate immediate return. Clinical Impression  1. Facial laceration, initial encounter    2. Closed head injury, initial encounter    3. Laceration of nose, initial encounter    4. Skin tear of left upper extremity    5. Sprain of right wrist, initial encounter    6. Acute pain of right wrist    7. Acute pain of both shoulders    8. Fall, initial encounter        Discharge Vital Signs:  Blood pressure (!) 148/94, pulse 60, temperature 98.1 °F (36.7 °C), temperature source Oral, resp. rate 18, height 5' 8\" (1.727 m), weight 238 lb (108 kg), SpO2 96 %. FOLLOW UP  [facility]  [address] [facility]. [address] [place] [address]  [phone]    Call in 1 day  For further evaluation    [facility]  ED  [address]  Go to   If symptoms worsen      DISPOSITION  Patient was discharged to home in good condition. Comment: Please note this report has been produced using speech recognition software and may contain errors related to that system including errors in grammar, punctuation, and spelling, as well as words and phrases that may be inappropriate. If there are any questions or concerns please feel free to contact the dictating provider for clarification.         EDUARDO Terry CNP  07/07/20 5423

## 2020-08-06 ENCOUNTER — OFFICE VISIT (OUTPATIENT)
Dept: ORTHOPEDIC SURGERY | Age: 80
End: 2020-08-06
Payer: MEDICARE

## 2020-08-06 VITALS — BODY MASS INDEX: 33.92 KG/M2 | WEIGHT: 229 LBS | HEIGHT: 69 IN

## 2020-08-06 PROCEDURE — 4004F PT TOBACCO SCREEN RCVD TLK: CPT | Performed by: ORTHOPAEDIC SURGERY

## 2020-08-06 PROCEDURE — G8417 CALC BMI ABV UP PARAM F/U: HCPCS | Performed by: ORTHOPAEDIC SURGERY

## 2020-08-06 PROCEDURE — 1123F ACP DISCUSS/DSCN MKR DOCD: CPT | Performed by: ORTHOPAEDIC SURGERY

## 2020-08-06 PROCEDURE — G8427 DOCREV CUR MEDS BY ELIG CLIN: HCPCS | Performed by: ORTHOPAEDIC SURGERY

## 2020-08-06 PROCEDURE — 4040F PNEUMOC VAC/ADMIN/RCVD: CPT | Performed by: ORTHOPAEDIC SURGERY

## 2020-08-06 PROCEDURE — 99213 OFFICE O/P EST LOW 20 MIN: CPT | Performed by: ORTHOPAEDIC SURGERY

## 2020-08-06 NOTE — PROGRESS NOTES
Chief Complaint    Knee Pain (lt knee pain ongoing pain for years, had a fall and hit knee and not getting better)      History of Present Illness:  Mirtha Sweeney is a 79 y.o. male who comes in today for evaluation treatment regarding his left knee. Referred in today for orthopedic consultation at the request of his PCP Dr. Kaylee Squires. Patient reports that he had a fall several weeks ago while going up the stairs in his home. He fell forward landing on both knees and striking his head on the wall requiring sutures. He was seen in the emergency room for his injuries. He has a known history of left knee osteoarthritis. He now reports increased pain and discomfort and instability in the knee.     Pain Assessment  Location of Pain: Knee  Location Modifiers: Left  Frequency of Pain: Intermittent  Aggravating Factors: Walking, Standing  Limiting Behavior: Some  Result of Injury: Yes  Work-Related Injury: No  Are there other pain locations you wish to document?: No       Medical History:  Past Medical History:   Diagnosis Date    Arthritis     Asthma     COPD    CAD (coronary artery disease)     S/P CABG    Gastric bypass status for obesity     Hyperlipidemia     Hypertension     Low kidney function     Osteoarthritis     Thyroid disease      Patient Active Problem List    Diagnosis Date Noted    Lower GI bleed 06/20/2020    Primary osteoarthritis of left knee 09/12/2017    Rotator cuff arthropathy 09/15/2016    Colon ulcer 05/06/2016    Complete rotator cuff tear 08/11/2015    Rotator cuff strain 07/31/2015    Shoulder pain 07/31/2015    Primary localized osteoarthrosis, lower leg 02/13/2015     Current Outpatient Medications   Medication Sig Dispense Refill    aspirin EC 81 MG EC tablet Take 1 tablet by mouth daily 90 tablet 1    pantoprazole (PROTONIX) 40 MG tablet Take 1 tablet by mouth every morning (before breakfast) 30 tablet 0    Cholecalciferol (VITAMIN D-3 PO) Take by mouth      infection. There are no cutaneous lesions. Varus deformity of the left knee. Palpation:  There is tenderness to palpation along the medial joint line. Range of Motion:  5 extension to 115 of active flexion. Strength:  4+/5 quadriceps and hamstrings    Special Tests: The knee is stable to varus valgus stressing/anterior posterior drawer. Negative Homans test.                                 Skin: There are no rashes, ulcerations or lesions. Gait: mildly antalgic favoring the left side. He is ambulating with a cane. Reflex 2+ patellar    Examination of the right knee reveals intact skin. There is no focal tenderness. The patient demonstrates full painless range of motion with regard to flexion and extension. Strength is 5/5 throughout all planes. Ligamentous stability is grossly intact. Examination of the left hip reveals intact skin. The patient demonstrates full painless range of motion with regards to flexion, abduction, internal and external rotation. There is no tenderness about the greater trochanter. There is a negative straight leg raise against resistance. Strength is 5/5 throughout all planes. Radiology:   X-rays obtained and reviewed in office:  Views 4 views including AP weightbearing, PA flexed weightbearing, lateral, and skyline  Location left knee:    Impression:   There is severe, end-stage osteoarthritis of the left knee with sclerosis and osteophyte formation present. No fractures are identified. 1 view AP pelvis demonstrate some moderate osteoarthritis but no evidence of any fractures. Impression:  Encounter Diagnoses   Name Primary?     Primary osteoarthritis of left knee Yes    Pain of left lower extremity        Office Procedures:  Orders Placed This Encounter   Procedures    XR KNEE LEFT (MIN 4 VIEWS)     Standing Status:   Future     Number of Occurrences:   1     Standing Expiration Date:   8/3/2021    XR PELVIS (1-2 VIEWS)     Standing Status:   Future Number of Occurrences:   1     Standing Expiration Date:   8/6/2021       Treatment Plan:  I discussed with the patient the nature of osteoarthritis of the knee. We talked about treatment of arthritis and the various options that are involved with this. The patient understands that the treatments can vary from essentially doing nothing to a total joint replacement arthroplasty for arthritis. I then went on to describe the utilization of glucosamine and chondroitin sulfate as a joint nutrition product. We talked about the fact that this is essentially a joint vitamin with typically minimal side effects. We also talked about utilization of prescription over-the-counter anti-inflammatory medications as the next option. We also discussed the possibility of brace wear or orthotic wear if the patient has significant varus alignment. We then went on to discuss the possibility of Visco supplementation with hyaluronate products. We talked about the typical course of this type of treatment and the fact that often times in the treatment for significant arthritis, this is successful less than half the time. We also talked about the corticosteroid injections and the fact that this can give a brief window of relief, but does not cure the problem; in fact, the pain often has a rebound effect in 6-10 weeks after the steroid has worn off. We also discussed arthroscopy surgery in attempts to debride the joint, but the fact that this is relatively unreliable treatment in the face of significant arthritis. It can occasionally be used, particularly if there is significant meniscus pathology. Lastly we discussed total joint replacement arthroplasty as the final and definitive step in treatment of arthritis. Patient realizes the magnitude of this type of treatment as well as having voiced a general understanding to the duration of the prosthesis. The patient voiced understanding to these continuum of treatment options.     My recommendations at this time were to avoid surgical intervention if possible. We will get him a course of outpatient physical therapy for strength and balance and also get him fitted for a medial  brace for the left knee. Because of his risk of falls to we have also recommended a stair lift chair since he does require assistance going up and down stairs and this will prevent additional falls and injuries.

## 2020-08-06 NOTE — LETTER
[place] [id number] [facility] [address]  Phone: [phone]  Fax: [phone]    Yamila Soares MD        August 6, 2020    [address] [facility] [address] [id number]    ORDER: STAIR LIFT  DX: LT KNEE OSTEOARTHRITIS M17.12          8/6/2020 11:43 AM    Yamila Soares MD

## 2020-08-10 ENCOUNTER — HOSPITAL ENCOUNTER (OUTPATIENT)
Dept: PHYSICAL THERAPY | Age: 80
Setting detail: THERAPIES SERIES
Discharge: HOME | End: 2020-08-10
Payer: MEDICARE

## 2020-08-10 PROCEDURE — 97161 PT EVAL LOW COMPLEX 20 MIN: CPT | Performed by: PHYSICAL THERAPIST

## 2020-08-10 PROCEDURE — 97110 THERAPEUTIC EXERCISES: CPT | Performed by: PHYSICAL THERAPIST

## 2020-08-10 NOTE — FLOWSHEET NOTE
[facility],  [address] [place]  Phone: [phone]  Fax [phone]    Physical Therapy Treatment Note/ Progress Report:           Date:  8/10/2020    Patient Name:  Pricila Barbour    :  1940  MRN: [id number]    Medical/Treatment Diagnosis Information:  · Diagnosis: M17.12 (ICD-10-CM) - Primary osteoarthritis of left knee;M79.605 (ICD-10-CM) - Pain of left lower extremity;R29.898 (ICD-10-CM) - Weakness of lower extremity, unspecified laterality;R26.89 (ICD-10-CM) - Balance problem  · Treatment Diagnosis: M79.605 (ICD-10-CM) - Pain of left lower extremity;R29.898 (ICD-10-CM) - Weakness of lower extremity, unspecified laterality;R26.89 (ICD-10-CM) - Balance problem  Insurance/Certification information:  PT Insurance Information: Medicare  Physician Information:  Referring Practitioner: Flavia Snider MD    Date of Patient follow up with Physician and OP note due: TBD    Latex Allergy/Pacemaker/Adhesive allergy:  [x]NO      []YES      Is this a Progress Report:     []  Yes  [x]  No      If Yes:  Date Range for reporting period:  Beginnin/10/20  Ending:    Progress report will be due (10 Rx or 30 days ):        Recertification will be due (POC Duration  / 90 days ): :9/10/20     Has the plan of care been signed (Y/N):        []  Yes  [x]  No         Visit # Date Range Insurance Allowable Requires auth   1  N     [x]no        []yes:           SUBJECTIVE:  Pain level:  4/10 See eval    OBJECTIVE: See eval   Observation:     PT Practice Pattern:D  Co morbidities:3+  NONsurgical:  INITIAL VISIT 8/10/20 CURRENT VISIT    PAIN 0-10/10     FUNCTIONAL SCALE LEFS     ROM KE      KF                             STRENGHT (MMT) KE      KF      HF      DF                       RESTRICTIONS/PRECAUTIONS: co morbidities (COPD,angina)    Exercises/Interventions:     HEP:Access Code: [email or web address]   Patient Portal: [email or web address]. [employer]/     Therapeutic Ex (59610) Reps/Sets/time Notes/CUES HEP   Seated HS stretch 3x15\"  X   Seated strap calf stretch 3x30\"  X   Seated QS 10x5\"  X   LLE LAQ 10x3\"  X                                       Pt education x8' Progression, POOC, balance and LE weakness          Manual Intervention (17566)                                          NMR re-education (30571)  CUES NEEDED                                                          Therapeutic Activity (82801)                                                Therapeutic Exercise and NMR EXR  [x] (78839) Provided verbal/tactile cueing for activities related to strengthening, flexibility, endurance, ROM for improvements in LE, proximal hip, and core control with self care, mobility, lifting, ambulation.  [] (65628) Provided verbal/tactile cueing for activities related to improving balance, coordination, kinesthetic sense, posture, motor skill, proprioception  to assist with LE, proximal hip, and core control in self care, mobility, lifting, ambulation and eccentric single leg control.      NMR and Therapeutic Activities:    [] (78783 or 65455) Provided verbal/tactile cueing for activities related to improving balance, coordination, kinesthetic sense, posture, motor skill, proprioception and motor activation to allow for proper function of core, proximal hip and LE with self care and ADLs  [] (64045) Gait Re-education- Provided training and instruction to the patient for proper LE, core and proximal hip recruitment and positioning and eccentric body weight control with ambulation re-education including up and down stairs     Home Exercise Program:    [x] (56442) Reviewed/Progressed HEP activities related to strengthening, flexibility, endurance, ROM of core, proximal hip and LE for functional self-care, mobility, lifting and ambulation/stair navigation   [] (80720)Reviewed/Progressed HEP activities related to improving balance, coordination, kinesthetic sense, posture, motor skill, proprioception of core, proximal hip and LE for self care, mobility, lifting, and ambulation/stair navigation      Manual Treatments:  PROM / STM / Oscillations-Mobs:  G-I, II, III, IV (PA's, Inf., Post.)  [] (96400) Provided manual therapy to mobilize LE, proximal hip and/or LS spine soft tissue/joints for the purpose of modulating pain, promoting relaxation,  increasing ROM, reducing/eliminating soft tissue swelling/inflammation/restriction, improving soft tissue extensibility and allowing for proper ROM for normal function with self care, mobility, lifting and ambulation. Trigger point Dry Needling:  fine needle insertion into myofascial trigger points to stimulate a healing response  [] (53878) Needle insertion without injection: 1 or 2 muscles  [] (08148) Needle insertion without injection: 3 or more muscles    Modalities:     [] GAME READY (VASO)- for significant edema, swelling, pain control. [] ESU (HV/PM) for edema and pain control      Charges:  Timed Code Treatment Minutes: 30   Total Treatment Minutes: 45       [x] EVAL (LOW) 63957 (typically 20 minutes face-to-face)  [] EVAL (MOD) 39491 (typically 30 minutes face-to-face)  [] EVAL (HIGH) 21224 (typically 45 minutes face-to-face)  [] RE-EVAL     [x] TS(16700) x  2   [] IONTO  [] NMR (72921) x     [] VASO  [] Manual (51104) x      [] Other:  [] TA x      [] Mech Traction (21029)  [] ES(attended) (93666)      [] ES (un) (86037):     GOALS:     Patient stated goal: to know how to get better balance  []? Progressing: []? Met: []? Not Met: []? Adjusted     Therapist goals for Patient:   Short Term Goals: To be achieved in: 2 weeks  1. Independent in HEP and progression per patient tolerance, in order to prevent re-injury. []? Progressing: []? Met: []? Not Met: []? Adjusted  2.  Patient will have a decrease in pain to facilitate improvement in movement, function, and ADLs as indicated by Functional Ward Jimenez, [id number]    Note: If patient does not return for scheduled/ recommended follow up visits, this note will serve as a discharge from care along with most recent update on progress.

## 2020-08-10 NOTE — PLAN OF CARE
[x]English       []other:    SUBJECTIVE: Patient stated complaint:Some ain in the left knee. Pt is unable to have surgery due to co morbidities. He has fallen in the past month 2 times. Relevant Medical History:B foot surgery, R shoulder surgery, hx of falls recently, heart issues. Functional Disability Index: LEFS (25/80) 69%    Height: 5'9\" Weight:225 lbs  Pain Scale: 4/10  Easing factors: rest  Provocative factors: walking     Type: [x]Constant   []Intermittent  []Radiating []Localized []other:     Numbness/Tingling: none reported    Occupation/School: retired    Living Status/Prior Level of Function: Independent with ADLs and IADLs, Pt has 10 steps in the house but has to go down backwards and pull self up with railings. Can only walk a little bit in the store without using the cart. OBJECTIVE:     ROM LEFT RIGHT   HIP Flex     HIP Abd     HIP Ext     HIP IR     HIP ER     Knee ext -15    Knee Flex 80    Ankle PF     Ankle DF     Ankle In     Ankle Ev     Strength  LEFT RIGHT   HIP Flexors 3/5 3/5   HIP Abductors 4/5 4/5   HIP Ext     Hip ER     Knee EXT (quad) 3+/5 4+/5   Knee Flex (HS) 4+/5 4+/5   Ankle DF 4-/5 4+/5   Ankle PF     Ankle Inv     Ankle EV          Circumference             Reflexes/Sensation: NT   []Dermatomes/Myotomes intact    [x]Reflexes equal and normal bilaterally   []Other:    Joint mobility: KE/KF   []Normal    [x]Hypo   []Hyper    Palpation: general TTP around L knee    Functional Mobility/Transfers: independent with care    Posture: increased body habitus    Bandages/Dressings/Incisions: NA    Gait: (include devices/WB status) pt ambulates with SPC and analgic gait    Orthopedic Special Tests: NA                       [x] Patient history, allergies, meds reviewed. Medical chart reviewed. See intake form. Review Of Systems (ROS):  [x]Performed Review of systems (Integumentary, CardioPulmonary, Neurological) by intake and observation.  Intake form has been scanned into medical record. Patient has been instructed to contact their primary care physician regarding ROS issues if not already being addressed at this time. Co-morbidities/Complexities (which will affect course of rehabilitation):   []None           Arthritic conditions   []Rheumatoid arthritis (M05.9)  [x]Osteoarthritis (M19.91)   Cardiovascular conditions   []Hypertension (I10)  []Hyperlipidemia (E78.5)  [x]Angina pectoris (I20)  [x]Atherosclerosis (I70)   Musculoskeletal conditions   []Disc pathology   []Congenital spine pathologies   [x]Prior surgical intervention  []Osteoporosis (M81.8)  []Osteopenia (M85.8)   Endocrine conditions   [x]Hypothyroid (E03.9)  []Hyperthyroid Gastrointestinal conditions   []Constipation (F06.00)   Metabolic conditions   [x]Morbid obesity (E66.01)  []Diabetes type 1(E10.65) or 2 (E11.65)   []Neuropathy (G60.9)     Pulmonary conditions   []Asthma (J45)  []Coughing   [x]COPD (J44.9)   Psychological Disorders  []Anxiety (F41.9)  []Depression (F32.9)   []Other:   []Other:          Barriers to/and or personal factors that will affect rehab potential:              [x]Age  []Sex              []Motivation/Lack of Motivation                        [x]Co-Morbidities              []Cognitive Function, education/learning barriers              [x]Environmental, home barriers              []profession/work barriers  []past PT/medical experience  []other:  Justification: pt is taking care of his wife with early onset Alzheimers. He also has multiple co morbidities that affect his endurance and strength, causing slow progress. Falls Risk Assessment (30 days): pt reports falls 2x over the last 30 days. [] Falls Risk assessed and no intervention required.   [x] Falls Risk assessed and Patient requires intervention due to being higher risk   TUG score (>12s at risk):   TUG 18 seconds with cane  [x] Falls education provided, including  Using cane, strength, flexibility, non sip shoes, removing throw handy.  Pt has a handicap friendly house. ASSESSMENT:   Functional Impairments:     []Noted lumbar/proximal hip/LE joint hypomobility   [x]Decreased LE functional ROM   [x]Decreased core/proximal hip strength and neuromuscular control   [x]Decreased LE functional strength   [x]Reduced balance/proprioceptive control   []other:      Functional Activity Limitations (from functional questionnaire and intake)   []Reduced ability to tolerate prolonged functional positions   []Reduced ability or difficulty with changes of positions or transfers between positions   []Reduced ability to maintain good posture and demonstrate good body mechanics with sitting, bending, and lifting   []Reduced ability to sleep   [] Reduced ability or tolerance with driving and/or computer work   [x]Reduced ability to perform lifting, carrying tasks   [x]Reduced ability to squat   [x]Reduced ability to forward bend   [x]Reduced ability to ambulate prolonged functional periods/distances/surfaces   [x]Reduced ability to ascend/descend stairs   []Reduced ability to run, hop, cut or jump   []other:    Participation Restrictions   []Reduced participation in self care activities   [x]Reduced participation in home management activities   [x]Reduced participation in work activities   [x]Reduced participation in social activities. []Reduced participation in sport/recreation activities. Classification :    []Signs/symptoms consistent with post-surgical status including decreased ROM, strength and function.    []Signs/symptoms consistent with joint sprain/strain   []Signs/symptoms consistent with patella-femoral syndrome   [x]Signs/symptoms consistent with knee OA/hip OA   []Signs/symptoms consistent with internal derangement of knee/Hip   []Signs/symptoms consistent with functional hip weakness/NMR control      []Signs/symptoms consistent with tendinitis/tendinosis    []signs/symptoms consistent with pathology which may benefit from Dry needling      []other:      Prognosis/Rehab Potential:      []Excellent   []Good    [x]Fair   []Poor    Tolerance of evaluation/treatment:    []Excellent   []Good    [x]Fair   []Poor  Physical Therapy Evaluation Complexity Justification  [x] A history of present problem with:  [] no personal factors and/or comorbidities that impact the plan of care;  []1-2 personal factors and/or comorbidities that impact the plan of care  [x]3 personal factors and/or comorbidities that impact the plan of care  [x] An examination of body systems using standardized tests and measures addressing any of the following: body structures and functions (impairments), activity limitations, and/or participation restrictions;:  [] a total of 1-2 or more elements   [x] a total of 3 or more elements   [] a total of 4 or more elements   [x] A clinical presentation with:  [] stable and/or uncomplicated characteristics   [] evolving clinical presentation with changing characteristics  [] unstable and unpredictable characteristics;   [x] Clinical decision making of [x] low, [] moderate, [] high complexity using standardized patient assessment instrument and/or measurable assessment of functional outcome. [x] EVAL (LOW) 73354 (typically 20 minutes face-to-face)  [] EVAL (MOD) 57706 (typically 30 minutes face-to-face)  [] EVAL (HIGH) 72813 (typically 45 minutes face-to-face)  [] RE-EVAL       PLAN:   Frequency/Duration:  2 days per week for 2-4 Weeks:  Interventions:  [x]  Therapeutic exercise including: strength training, ROM, for Lower extremity and core   [x]  NMR activation and proprioception for LE, Glutes and Core   [x]  Manual therapy as indicated for LE, Hip and spine to include: Dry Needling/IASTM, STM, PROM, Gr I-IV mobilizations, manipulation.    [x] Modalities as needed that may include: thermal agents, E-stim, Biofeedback, US, iontophoresis as indicated  [x] Patient education on joint protection, postural re-education, activity modification, progression of HEP. HEP instruction: HEP instruction was provided and handouts given to include:  (see scanned forms)  Access Code: [email or web address]   Patient Portal: [email or web address]     GOALS:  Patient stated goal: to know how to get better balance  [] Progressing: [] Met: [] Not Met: [] Adjusted    Therapist goals for Patient:   Short Term Goals: To be achieved in: 2 weeks  1. Independent in HEP and progression per patient tolerance, in order to prevent re-injury. [] Progressing: [] Met: [] Not Met: [] Adjusted  2. Patient will have a decrease in pain to facilitate improvement in movement, function, and ADLs as indicated by Functional Deficits. [] Progressing: [] Met: [] Not Met: [] Adjusted    Long Term Goals: To be achieved in: 4 weeks  1. Disability index score of 45% or less for the LEFS to assist with reaching prior level of function. [] Progressing: [] Met: [] Not Met: [] Adjusted  2. Patient will demonstrate increased AROM to -8 to 100 to allow for proper joint functioning as indicated by patients Functional Deficits. [] Progressing: [] Met: [] Not Met: [] Adjusted  3. Patient will demonstrate an increase in Strength to good proximal hip strength and control, 4 to4+/5 in LE to allow for proper functional mobility as indicated by patients Functional Deficits. [] Progressing: [] Met: [] Not Met: [] Adjusted  4.  Pt will be able to walk for more than 15 minutes without using cart in store. (patient specific functional goal)    [] Progressing: [] Met: [] Not Met: [] Adjusted     Electronically signed by:  Eboni Archibald, [address] [facility]

## 2020-08-13 ENCOUNTER — HOSPITAL ENCOUNTER (OUTPATIENT)
Dept: PHYSICAL THERAPY | Age: 80
Setting detail: THERAPIES SERIES
Discharge: HOME | End: 2020-08-13
Payer: MEDICARE

## 2020-08-13 PROCEDURE — 97110 THERAPEUTIC EXERCISES: CPT | Performed by: PHYSICAL THERAPIST

## 2020-08-13 PROCEDURE — 97112 NEUROMUSCULAR REEDUCATION: CPT | Performed by: PHYSICAL THERAPIST

## 2020-08-13 PROCEDURE — 97140 MANUAL THERAPY 1/> REGIONS: CPT | Performed by: PHYSICAL THERAPIST

## 2020-08-13 NOTE — FLOWSHEET NOTE
[facility],  [address]  Phone: [phone]  Fax [phone]    Physical Therapy Treatment Note/ Progress Report:           Date:  2020    Patient Name:  Jone Escobar    :  1940  MRN: [id number]    Medical/Treatment Diagnosis Information:  · Diagnosis: M17.12 (ICD-10-CM) - Primary osteoarthritis of left knee;M79.605 (ICD-10-CM) - Pain of left lower extremity;R29.898 (ICD-10-CM) - Weakness of lower extremity, unspecified laterality;R26.89 (ICD-10-CM) - Balance problem  · Treatment Diagnosis: M79.605 (ICD-10-CM) - Pain of left lower extremity;R29.898 (ICD-10-CM) - Weakness of lower extremity, unspecified laterality;R26.89 (ICD-10-CM) - Balance problem  Insurance/Certification information:  PT Insurance Information: Medicare  Physician Information:  Referring Practitioner: Harinder Kent MD    Date of Patient follow up with Physician and OP note due: TBD    Latex Allergy/Pacemaker/Adhesive allergy:  [x]NO      []YES      Is this a Progress Report:     []  Yes  [x]  No      If Yes:  Date Range for reporting period:  Beginnin/10/20  Ending:    Progress report will be due (10 Rx or 30 days ):        Recertification will be due (POC Duration  / 90 days ): :9/10/20     Has the plan of care been signed (Y/N):        []  Yes  [x]  No         Visit # Date Range Insurance Allowable Requires auth   2 8/10/20- [facility]    [x]no        []yes:           SUBJECTIVE:     Pt states he is doing his HEP. Does not ice.      Pain level:  4/10      OBJECTIVE: Observation: patellar mobility significantly decreased all directions; tight EXT mobs    PT Practice Pattern:D  Co morbidities:3+  NONsurgical:  INITIAL VISIT 8/10/20 CURRENT VISIT    PAIN 0-10/10     FUNCTIONAL SCALE LEFS     ROM KE      KF                             STRENGHT (MMT) KE      KF      HF      DF                       RESTRICTIONS/PRECAUTIONS: co morbidities (COPD,angina)    Exercises/Interventions:     HEP:Access Code: [email or web address]   Patient Portal: [email or web address]     Therapeutic Ex (65308) Reps/Sets/time Notes/CUES HEP   Seated HS stretch 3x15\"  X   Seated strap calf stretch 3x30\"  X   Seated QS post mobs 10x5\" VC/MC no not do HS set X   LLE LAQ 10x3\"  X   Seated SAQ with 6\" bolster 10x No hold due to knee pain    Long Seated ADD set 10x5\"  X 8/13   Seated KF stretch 10x3\"  X 8/13                     Pt education x8' Progression, POOC, balance and LE weakness          Manual Intervention (28396)      patellar mobs all directions focused superior GII-III 5'     Post fem mobs for EXT GII 4'                             NMR re-education (57605)  CUES NEEDED    Modified tandem stance R/L 3x15\" ea with HHS     lateral weight shifting 12x with HHS  X 8/13   marching 5xs Topped due to pain                                        Therapeutic Activity (29086)                                                Therapeutic Exercise and NMR EXR  [x] (42550) Provided verbal/tactile cueing for activities related to strengthening, flexibility, endurance, ROM for improvements in LE, proximal hip, and core control with self care, mobility, lifting, ambulation.  [] (26986) Provided verbal/tactile cueing for activities related to improving balance, coordination, kinesthetic sense, posture, motor skill, proprioception  to assist with LE, proximal hip, and core control in self care, mobility, lifting, ambulation and eccentric single leg control.      NMR and Therapeutic Activities:    [x] (16520 or 72574) Provided verbal/tactile cueing for activities related to improving balance, coordination, kinesthetic sense, posture, motor skill, proprioception and motor activation to allow for proper function of core, proximal hip and LE with self care and ADLs  [] (98055) Gait Re-education- Provided training and instruction to the patient for proper LE, core and proximal hip recruitment and positioning and eccentric body weight control with ambulation re-education including up and down stairs     Home Exercise Program:    [x] (77277) Reviewed/Progressed HEP activities related to strengthening, flexibility, endurance, ROM of core, proximal hip and LE for functional self-care, mobility, lifting and ambulation/stair navigation   [] (61119)Reviewed/Progressed HEP activities related to improving balance, coordination, kinesthetic sense, posture, motor skill, proprioception of core, proximal hip and LE for self care, mobility, lifting, and ambulation/stair navigation      Manual Treatments:  PROM / STM / Oscillations-Mobs:  G-I, II, III, IV (PA's, Inf., Post.)  [x] (17853) Provided manual therapy to mobilize LE, proximal hip and/or LS spine soft tissue/joints for the purpose of modulating pain, promoting relaxation,  increasing ROM, reducing/eliminating soft tissue swelling/inflammation/restriction, improving soft tissue extensibility and allowing for proper ROM for normal function with self care, mobility, lifting and ambulation. Trigger point Dry Needling:  fine needle insertion into myofascial trigger points to stimulate a healing response  [] (85732) Needle insertion without injection: 1 or 2 muscles  [] (06043) Needle insertion without injection: 3 or more muscles    Modalities:     [] GAME READY (VASO)- for significant edema, swelling, pain control.     [] ESU (HV/PM) for edema and pain control      Charges:  Timed Code Treatment Minutes: 45   Total Treatment Minutes: 45       [] EVAL (LOW) 76113 (typically 20 minutes face-to-face)  [] EVAL (MOD) 61733 (typically 30 minutes face-to-face)  [] EVAL (HIGH) 42125 (typically 45 minutes face-to-face)  [] RE-EVAL     [x] JY(97130) x  1   [] IONTO  [x] NMR (37959) x  1   [] VASO  [x] Manual (17041) x  1    [] Other:  [] TA x      [] Mech Traction (68183)  [] ES(attended) (32369)      [] ES (un) (23641):     GOALS:     Patient stated goal: to know how to get better balance  []? Progressing: []? Met: []? Not Met: []? Adjusted     Therapist goals for Patient:   Short Term Goals: To be achieved in: 2 weeks  1. Independent in HEP and progression per patient tolerance, in order to prevent re-injury. []? Progressing: []? Met: []? Not Met: []? Adjusted  2. Patient will have a decrease in pain to facilitate improvement in movement, function, and ADLs as indicated by Functional Deficits. []? Progressing: []? Met: []? Not Met: []? Adjusted     Long Term Goals: To be achieved in: 4 weeks  1. Disability index score of 45% or less for the LEFS to assist with reaching prior level of function. []? Progressing: []? Met: []? Not Met: []? Adjusted  2. Patient will demonstrate increased AROM to -8 to 100 to allow for proper joint functioning as indicated by patients Functional Deficits. []? Progressing: []? Met: []? Not Met: []? Adjusted  3. Patient will demonstrate an increase in Strength to good proximal hip strength and control, 4 to4+/5 in LE to allow for proper functional mobility as indicated by patients Functional Deficits. []? Progressing: []? Met: []? Not Met: []? Adjusted  4. Pt will be able to walk for more than 15 minutes without using cart in store. (patient specific functional goal)    []? Progressing: []? Met: []? Not Met: []? Adjusted         ASSESSMENT: Pt exhibits very low tolerance to exercises in clinic both endurance and pain wise in LLE. Overall Progression Towards Functional goals/ Treatment Progress Update:  [] Patient is progressing as expected towards functional goals listed. [] Progression is slowed due to complexities/Impairments listed. [] Progression has been slowed due to co-morbidities.   [x] Plan just implemented, too soon to assess goals progression <30days   [] Goals require adjustment due to lack of progress  [] Patient is not progressing as expected and requires additional follow up with physician  [] Other    Prognosis

## 2020-08-17 ENCOUNTER — HOSPITAL ENCOUNTER (OUTPATIENT)
Dept: PHYSICAL THERAPY | Age: 80
Setting detail: THERAPIES SERIES
Discharge: HOME | End: 2020-08-17
Payer: MEDICARE

## 2020-08-17 PROCEDURE — 97140 MANUAL THERAPY 1/> REGIONS: CPT | Performed by: PHYSICAL THERAPIST

## 2020-08-17 PROCEDURE — 97112 NEUROMUSCULAR REEDUCATION: CPT | Performed by: PHYSICAL THERAPIST

## 2020-08-17 PROCEDURE — 97110 THERAPEUTIC EXERCISES: CPT | Performed by: PHYSICAL THERAPIST

## 2020-08-17 NOTE — FLOWSHEET NOTE
[facility],  [address] [place]  Phone: [phone]  Fax [phone]    Physical Therapy Treatment Note/ Progress Report:           Date:  2020    Patient Name:  Pricila Barbour    :  1940  MRN: [id number]    Medical/Treatment Diagnosis Information:  · Diagnosis: M17.12 (ICD-10-CM) - Primary osteoarthritis of left knee;M79.605 (ICD-10-CM) - Pain of left lower extremity;R29.898 (ICD-10-CM) - Weakness of lower extremity, unspecified laterality;R26.89 (ICD-10-CM) - Balance problem  · Treatment Diagnosis: M79.605 (ICD-10-CM) - Pain of left lower extremity;R29.898 (ICD-10-CM) - Weakness of lower extremity, unspecified laterality;R26.89 (ICD-10-CM) - Balance problem  Insurance/Certification information:  PT Insurance Information: Medicare  Physician Information:  Referring Practitioner: Flavia Snider MD    Date of Patient follow up with Physician and OP note due: TBD    Latex Allergy/Pacemaker/Adhesive allergy:  [x]NO      []YES      Is this a Progress Report:     []  Yes  [x]  No      If Yes:  Date Range for reporting period:  Beginnin/10/20  Ending:    Progress report will be due (10 Rx or 30 days ):        Recertification will be due (POC Duration  / 90 days ): :9/10/20     Has the plan of care been signed (Y/N):        []  Yes  [x]  No         Visit # Date Range Insurance Allowable Requires auth   3 8/10/20- [facility]    [x]no        []yes:           SUBJECTIVE:     Pt states he is doing exercises and feel sore afterwards but it goes away. The only thing that helps is Tylenol. Pain is on the inside of the knee. Pain is achy sore.      Pain level:  0-4/10      OBJECTIVE: Observation: patellar mobility significantly decreased all directions; tight EXT mobs    PT Practice Pattern:D  Co morbidities:3+  NONsurgical:  INITIAL VISIT 8/10/20 CURRENT VISIT    PAIN 0-10/10     FUNCTIONAL SCALE LEFS     ROM KE with self care and ADLs  [] (82366) Gait Re-education- Provided training and instruction to the patient for proper LE, core and proximal hip recruitment and positioning and eccentric body weight control with ambulation re-education including up and down stairs     Home Exercise Program:    [x] (71496) Reviewed/Progressed HEP activities related to strengthening, flexibility, endurance, ROM of core, proximal hip and LE for functional self-care, mobility, lifting and ambulation/stair navigation   [] (76708)Reviewed/Progressed HEP activities related to improving balance, coordination, kinesthetic sense, posture, motor skill, proprioception of core, proximal hip and LE for self care, mobility, lifting, and ambulation/stair navigation      Manual Treatments:  PROM / STM / Oscillations-Mobs:  G-I, II, III, IV (PA's, Inf., Post.)  [x] (20166) Provided manual therapy to mobilize LE, proximal hip and/or LS spine soft tissue/joints for the purpose of modulating pain, promoting relaxation,  increasing ROM, reducing/eliminating soft tissue swelling/inflammation/restriction, improving soft tissue extensibility and allowing for proper ROM for normal function with self care, mobility, lifting and ambulation. Trigger point Dry Needling:  fine needle insertion into myofascial trigger points to stimulate a healing response  [] (14908) Needle insertion without injection: 1 or 2 muscles  [] (32955) Needle insertion without injection: 3 or more muscles    Modalities:     [] GAME READY (VASO)- for significant edema, swelling, pain control.     [] ESU (HV/PM) for edema and pain control      Charges:  Timed Code Treatment Minutes: 45   Total Treatment Minutes: 45       [] EVAL (LOW) 20298 (typically 20 minutes face-to-face)  [] EVAL (MOD) 58667 (typically 30 minutes face-to-face)  [] EVAL (HIGH) 42753 (typically 45 minutes face-to-face)  [] RE-EVAL     [x] UV(12963) x  1   [] IONTO  [x] NMR (55594) x  1   [] VASO  [x] Manual (99669) x  1 [] Other:  [] TA x      [] [facility] Traction (89318)  [] ES(attended) (88853)      [] ES (un) (22215):     GOALS:     Patient stated goal: to know how to get better balance  []? Progressing: []? Met: []? Not Met: []? Adjusted     Therapist goals for Patient:   Short Term Goals: To be achieved in: 2 weeks  1. Independent in HEP and progression per patient tolerance, in order to prevent re-injury. []? Progressing: []? Met: []? Not Met: []? Adjusted  2. Patient will have a decrease in pain to facilitate improvement in movement, function, and ADLs as indicated by Functional Deficits. []? Progressing: []? Met: []? Not Met: []? Adjusted     Long Term Goals: To be achieved in: 4 weeks  1. Disability index score of 45% or less for the LEFS to assist with reaching prior level of function. []? Progressing: []? Met: []? Not Met: []? Adjusted  2. Patient will demonstrate increased AROM to -8 to 100 to allow for proper joint functioning as indicated by patients Functional Deficits. []? Progressing: []? Met: []? Not Met: []? Adjusted  3. Patient will demonstrate an increase in Strength to good proximal hip strength and control, 4 to4+/5 in LE to allow for proper functional mobility as indicated by patients Functional Deficits. []? Progressing: []? Met: []? Not Met: []? Adjusted  4. Pt will be able to walk for more than 15 minutes without using cart in store. (patient specific functional goal)    []? Progressing: []? Met: []? Not Met: []? Adjusted         ASSESSMENT: Pt exhibits very low tolerance to exercises in clinic both endurance and pain wise in LLE still this visit. Overall Progression Towards Functional goals/ Treatment Progress Update:  [] Patient is progressing as expected towards functional goals listed. [] Progression is slowed due to complexities/Impairments listed. [] Progression has been slowed due to co-morbidities.   [x] Plan just implemented, too soon to assess goals progression <30days   [] Goals require adjustment due to lack of progress  [] Patient is not progressing as expected and requires additional follow up with physician  [] Other    Prognosis for POC: [x] Good [] Fair  [] Poor      Patient requires continued skilled intervention: [x] Yes  [] No    Treatment/Activity Tolerance:  [x] Patient able to complete treatment  [] Patient limited by fatigue  [] Patient limited by pain    [] Patient limited by other medical complications  [] Other:                   PLAN: DC posy NV due to low tolerance and COVID concerns. [x] Continue per plan of care [] Alter current plan (see comments above)  [] Plan of care initiated [] Hold pending MD visit [] Discharge      Electronically signed by:  Keila Naik, [id number]    Note: If patient does not return for scheduled/ recommended follow up visits, this note will serve as a discharge from care along with most recent update on progress.

## 2020-08-20 ENCOUNTER — TELEPHONE (OUTPATIENT)
Dept: ORTHOPEDIC SURGERY | Age: 80
End: 2020-08-20

## 2020-08-20 ENCOUNTER — HOSPITAL ENCOUNTER (OUTPATIENT)
Dept: PHYSICAL THERAPY | Age: 80
Setting detail: THERAPIES SERIES
Discharge: HOME | End: 2020-08-20
Payer: MEDICARE

## 2020-08-20 PROCEDURE — 97110 THERAPEUTIC EXERCISES: CPT | Performed by: PHYSICAL THERAPIST

## 2020-08-20 PROCEDURE — 97140 MANUAL THERAPY 1/> REGIONS: CPT | Performed by: PHYSICAL THERAPIST

## 2020-08-20 NOTE — DISCHARGE SUMMARY
[facility], [address] [place]  Phone: [phone]  Fax [phone]    Physical Therapy Discharge Note           Date:  2020    Patient Name:  Janene Schwarz    :  1940  MRN: [id number]    Medical/Treatment Diagnosis Information:  · Diagnosis: M17.12 (ICD-10-CM) - Primary osteoarthritis of left knee;M79.605 (ICD-10-CM) - Pain of left lower extremity;R29.898 (ICD-10-CM) - Weakness of lower extremity, unspecified laterality;R26.89 (ICD-10-CM) - Balance problem  · Treatment Diagnosis: M79.605 (ICD-10-CM) - Pain of left lower extremity;R29.898 (ICD-10-CM) - Weakness of lower extremity, unspecified laterality;R26.89 (ICD-10-CM) - Balance problem  Insurance/Certification information:  PT Insurance Information: Medicare  Physician Information:  Referring Practitioner: Ami Hernandez MD          Visit # Date Range Insurance Allowable Requires auth   4 8/10/20-20 [facility]     [x]no        []yes:           SUBJECTIVE: Pt reports his knee is not bad but still sore like before. Pain level:  0-4/10      OBJECTIVE: Observation: patellar mobility increased all directions. TTP and very tight with STM adductors. Tight EXT mobs. PT Practice Pattern:D  Co morbidities:3+  NONsurgical  INITIAL VISIT 8/10/20 CURRENT VISIT 20   PAIN 0-10/10 4/10 1-2/10   FUNCTIONAL SCALE LEFS () 69% () 45%   ROM KE -15 -15    KF 80 90                           STRENGHT (MMT) KE 3+/5 4+/5    KF 4+/5 3/5    HF 3/5 4/5    DF 4-/5 4-/5                       GOALS:     Patient stated goal: to know how to get better balance  []? Progressing: [x]? Met: []? Not Met: []? Adjusted     Therapist goals for Patient:   Short Term Goals: To be achieved in: 2 weeks  1. Independent in HEP and progression per patient tolerance, in order to prevent re-injury. []? Progressing: [x]? Met: []? Not Met: []? Adjusted  2.  Patient will have a decrease in pain to facilitate improvement in movement, function, and ADLs as indicated by Functional Deficits. []? Progressing: [x]? Met: []? Not Met: []? Adjusted     Long Term Goals: To be achieved in: 4 weeks  1. Disability index score of 45% or less for the LEFS to assist with reaching prior level of function. []? Progressing: []? Met: [x]? Not Met: []? Adjusted  2. Patient will demonstrate increased AROM to -8 to 100 to allow for proper joint functioning as indicated by patients Functional Deficits. []? Progressing: []? Met: [x]? Not Met: []? Adjusted  3. Patient will demonstrate an increase in Strength to good proximal hip strength and control, 4 to4+/5 in LE to allow for proper functional mobility as indicated by patients Functional Deficits. []? Progressing: []? Met: [x]? Not Met: []? Adjusted  4. Pt will be able to walk for more than 15 minutes without using cart in store. (patient specific functional goal)    []? Progressing: []? Met: [x]? Not Met: []? Adjusted         ASSESSMENT: Pt exhibits very low tolerance to exercises and manual this visit in clinic. If he continues to perform therex as prescribed for HEP he should have continual increase in ROM and strength to increase his function as well as improved balance. Progress toward goals inconsistent depending on the day due to the nature of ever OA. Overall Progression Towards Functional goals/ Treatment Progress Update:  [] Patient is progressing as expected towards functional goals listed. [] Progression is slowed due to complexities/Impairments listed. [x] Progression has been slowed due to co-morbidities.   [] Plan just implemented, too soon to assess goals progression <30days   [] Goals require adjustment due to lack of progress  [] Patient is not progressing as expected and requires additional follow up with physician  [x] Other 2/7 goals met    Prognosis for POC: [x] Good [] Fair  [] Poor      Patient requires continued skilled intervention: [] Yes  [x] No    Treatment/Activity Tolerance:  [x] Patient able to complete treatment  [] Patient limited by fatigue  [] Patient limited by pain    [] Patient limited by other medical complications  [] Other:                   PLAN: DC PT  [] Continue per plan of care [] Alter current plan (see comments above)  [] Plan of care initiated [] Hold pending MD visit [x] Discharge      Electronically signed by:  Lucrecia Severe, [id number]    Note: If patient does not return for scheduled/ recommended follow up visits, this note will serve as a discharge from care along with most recent update on progress.

## 2020-08-20 NOTE — FLOWSHEET NOTE
[facility],  [address]  Phone: [phone]  Fax [phone]    Physical Therapy Treatment Note/ Progress Report:           Date:  2020    Patient Name:  Mirtha Sweeney    :  1940  MRN: [id number]    Medical/Treatment Diagnosis Information:  · Diagnosis: M17.12 (ICD-10-CM) - Primary osteoarthritis of left knee;M79.605 (ICD-10-CM) - Pain of left lower extremity;R29.898 (ICD-10-CM) - Weakness of lower extremity, unspecified laterality;R26.89 (ICD-10-CM) - Balance problem  · Treatment Diagnosis: M79.605 (ICD-10-CM) - Pain of left lower extremity;R29.898 (ICD-10-CM) - Weakness of lower extremity, unspecified laterality;R26.89 (ICD-10-CM) - Balance problem  Insurance/Certification information:  PT Insurance Information: Medicare  Physician Information:  Referring Practitioner: Latesha Morales MD    Date of Patient follow up with Physician and OP note due: TBD    Latex Allergy/Pacemaker/Adhesive allergy:  [x]NO      []YES      Is this a Progress Report:     [x]  Yes DC note  []  No      If Yes:  Date Range for reporting period:  Beginnin/10/20  Endin20    Progress report will be due (10 Rx or 30 days ):        Recertification will be due (POC Duration  / 90 days ): :9/10/20     Has the plan of care been signed (Y/N):        []  Yes  [x]  No         Visit # Date Range Insurance Allowable Requires auth   4 8/10/20-20 [facility]     [x]no        []yes:           SUBJECTIVE: Pt reports his knee is not bad but still sore like before. Pain level:  0-4/10      OBJECTIVE: Observation: patellar mobility increased all directions. TTP and very tight with STM adductors. Tight EXT mobs.     PT Practice Pattern:D  Co morbidities:3+  NONsurgical  INITIAL VISIT 8/10/20 CURRENT VISIT 20   PAIN 0-10/10 4/10 1-2/10   FUNCTIONAL SCALE LEFS (25/80) 69%    ROM KE -15 -15    KF 80 90 STRENGHT (MMT) KE 3+/5 4+/5    KF 4+/5 3/5    HF 3/5 4/5    DF 4-/5 4-/5                     RESTRICTIONS/PRECAUTIONS: co morbidities (COPD,angina)    Exercises/Interventions:     HEP:Access Code: [email or web address]   Patient Portal: [email or web address]     Therapeutic Ex (57252) Reps/Sets/time Notes/CUES HEP   Seated HS stretch 3x15\"  X   Seated strap calf stretch 3x30\"  X   VC/MC no not do HS set X    X   No hold due to knee pain     X 8/13    X 8/13   LLLD EXT stretch seated 3x2'                 Pt education x8' Progression, POC, balance and LE weakness    Re assessment x1     Manual Intervention (77070)      patellar mobs all directions focused superior GII-III, STM ADD and quad 8'     Post fem mobs for EXT GII 4'                             NMR re-education (37426)  CUES NEEDED         X 8/13   Topped due to pain            Pt stated L knee feels weak                      Therapeutic Activity (51378)                                                Therapeutic Exercise and NMR EXR  [x] (01857) Provided verbal/tactile cueing for activities related to strengthening, flexibility, endurance, ROM for improvements in LE, proximal hip, and core control with self care, mobility, lifting, ambulation.  [] (72618) Provided verbal/tactile cueing for activities related to improving balance, coordination, kinesthetic sense, posture, motor skill, proprioception  to assist with LE, proximal hip, and core control in self care, mobility, lifting, ambulation and eccentric single leg control.      NMR and Therapeutic Activities:    [] (63510 or 47605) Provided verbal/tactile cueing for activities related to improving balance, coordination, kinesthetic sense, posture, motor skill, proprioception and motor activation to allow for proper function of core, proximal hip and LE with self care and ADLs  [] (59135) Gait Re-education- Provided training and instruction to the patient for proper LE, core and proximal hip recruitment and positioning and eccentric body weight control with ambulation re-education including up and down stairs     Home Exercise Program:    [x] (85299) Reviewed/Progressed HEP activities related to strengthening, flexibility, endurance, ROM of core, proximal hip and LE for functional self-care, mobility, lifting and ambulation/stair navigation   [] (23528)Reviewed/Progressed HEP activities related to improving balance, coordination, kinesthetic sense, posture, motor skill, proprioception of core, proximal hip and LE for self care, mobility, lifting, and ambulation/stair navigation      Manual Treatments:  PROM / STM / Oscillations-Mobs:  G-I, II, III, IV (PA's, Inf., Post.)  [x] (61103) Provided manual therapy to mobilize LE, proximal hip and/or LS spine soft tissue/joints for the purpose of modulating pain, promoting relaxation,  increasing ROM, reducing/eliminating soft tissue swelling/inflammation/restriction, improving soft tissue extensibility and allowing for proper ROM for normal function with self care, mobility, lifting and ambulation. Trigger point Dry Needling:  fine needle insertion into myofascial trigger points to stimulate a healing response  [] (21674) Needle insertion without injection: 1 or 2 muscles  [] (94386) Needle insertion without injection: 3 or more muscles    Modalities:     [] GAME READY (VASO)- for significant edema, swelling, pain control.     [] ESU (HV/PM) for edema and pain control      Charges:  Timed Code Treatment Minutes: 38   Total Treatment Minutes: 38       [] EVAL (LOW) 22683 (typically 20 minutes face-to-face)  [] EVAL (MOD) 27000 (typically 30 minutes face-to-face)  [] EVAL (HIGH) 31041 (typically 45 minutes face-to-face)  [] RE-EVAL     [x] DB(34103) x 2    [] IONTO  [] NMR (66065) x    [] VASO  [x] Manual (99398) x 1   [] Other:  [] TA x      [] Mech Traction (57575)  [] ES(attended) (84803)      [] ES (un) (28594):     GOALS:     Patient stated goal: to know how to get better balance  []? Progressing: [x]? Met: []? Not Met: []? Adjusted     Therapist goals for Patient:   Short Term Goals: To be achieved in: 2 weeks  1. Independent in HEP and progression per patient tolerance, in order to prevent re-injury. []? Progressing: [x]? Met: []? Not Met: []? Adjusted  2. Patient will have a decrease in pain to facilitate improvement in movement, function, and ADLs as indicated by Functional Deficits. []? Progressing: [x]? Met: []? Not Met: []? Adjusted     Long Term Goals: To be achieved in: 4 weeks  1. Disability index score of 45% or less for the LEFS to assist with reaching prior level of function. []? Progressing: []? Met: []? Not Met: []? Adjusted  2. Patient will demonstrate increased AROM to -8 to 100 to allow for proper joint functioning as indicated by patients Functional Deficits. []? Progressing: []? Met: [x]? Not Met: []? Adjusted  3. Patient will demonstrate an increase in Strength to good proximal hip strength and control, 4 to4+/5 in LE to allow for proper functional mobility as indicated by patients Functional Deficits. []? Progressing: []? Met: [x]? Not Met: []? Adjusted  4. Pt will be able to walk for more than 15 minutes without using cart in store. (patient specific functional goal)    []? Progressing: []? Met: [x]? Not Met: []? Adjusted         ASSESSMENT: Pt exhibits very low tolerance to exercises and manual this visit in clinic. If he continues to perform therex as prescribed for HEP he should have continual increase in ROM and strength to increase his function as well as improved balance. Progress toward goals inconsistent depending on the day due to the nature of ever OA. Overall Progression Towards Functional goals/ Treatment Progress Update:  [x] Patient is progressing as expected towards functional goals listed. [] Progression is slowed due to complexities/Impairments listed. [] Progression has been slowed due to co-morbidities.   [] Plan just implemented, too soon to assess goals progression <30days   [] Goals require adjustment due to lack of progress  [] Patient is not progressing as expected and requires additional follow up with physician  [] Other    Prognosis for POC: [x] Good [] Fair  [] Poor      Patient requires continued skilled intervention: [] Yes  [x] No    Treatment/Activity Tolerance:  [x] Patient able to complete treatment  [] Patient limited by fatigue  [] Patient limited by pain    [] Patient limited by other medical complications  [] Other:                   PLAN: DC PT  [] Continue per plan of care [] Alter current plan (see comments above)  [] Plan of care initiated [] Hold pending MD visit [x] Discharge      Electronically signed by:  Niyah Maradiaga, [id number]    Note: If patient does not return for scheduled/ recommended follow up visits, this note will serve as a discharge from care along with most recent update on progress.

## 2020-09-01 PROCEDURE — L1845 KO DOUBLE UPRIGHT PRE CST: HCPCS | Performed by: ORTHOPAEDIC SURGERY

## 2020-09-10 ENCOUNTER — TELEPHONE (OUTPATIENT)
Dept: ORTHOPEDIC SURGERY | Age: 80
End: 2020-09-10

## 2021-07-09 ENCOUNTER — APPOINTMENT (OUTPATIENT)
Dept: GENERAL RADIOLOGY | Age: 81
End: 2021-07-09
Payer: MEDICARE

## 2021-07-09 ENCOUNTER — HOSPITAL ENCOUNTER (EMERGENCY)
Age: 81
Discharge: HOME | End: 2021-07-09
Attending: EMERGENCY MEDICINE
Payer: MEDICARE

## 2021-07-09 ENCOUNTER — APPOINTMENT (OUTPATIENT)
Dept: CT IMAGING | Age: 81
End: 2021-07-09
Payer: MEDICARE

## 2021-07-09 VITALS
WEIGHT: 262 LBS | SYSTOLIC BLOOD PRESSURE: 153 MMHG | TEMPERATURE: 98 F | DIASTOLIC BLOOD PRESSURE: 70 MMHG | HEART RATE: 50 BPM | OXYGEN SATURATION: 99 % | BODY MASS INDEX: 41.12 KG/M2 | HEIGHT: 67 IN | RESPIRATION RATE: 16 BRPM

## 2021-07-09 DIAGNOSIS — S92.424A CLOSED NONDISPLACED FRACTURE OF DISTAL PHALANX OF RIGHT GREAT TOE, INITIAL ENCOUNTER: ICD-10-CM

## 2021-07-09 DIAGNOSIS — S09.90XA CLOSED HEAD INJURY, INITIAL ENCOUNTER: ICD-10-CM
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
## 2021-07-09 PROCEDURE — 93005 ELECTROCARDIOGRAM TRACING: CPT | Performed by: EMERGENCY MEDICINE

## 2021-07-09 PROCEDURE — 6370000000 HC RX 637 (ALT 250 FOR IP): Performed by: PHYSICIAN ASSISTANT

## 2021-07-09 PROCEDURE — 73620 X-RAY EXAM OF FOOT: CPT

## 2021-07-09 PROCEDURE — 70486 CT MAXILLOFACIAL W/O DYE: CPT

## 2021-07-09 PROCEDURE — 73080 X-RAY EXAM OF ELBOW: CPT

## 2021-07-09 PROCEDURE — 71046 X-RAY EXAM CHEST 2 VIEWS: CPT

## 2021-07-09 PROCEDURE — 73521 X-RAY EXAM HIPS BI 2 VIEWS: CPT

## 2021-07-09 PROCEDURE — 73560 X-RAY EXAM OF KNEE 1 OR 2: CPT

## 2021-07-09 PROCEDURE — 72125 CT NECK SPINE W/O DYE: CPT

## 2021-07-09 PROCEDURE — 99284 EMERGENCY DEPT VISIT MOD MDM: CPT

## 2021-07-09 PROCEDURE — 70450 CT HEAD/BRAIN W/O DYE: CPT

## 2021-07-09 PROCEDURE — 73110 X-RAY EXAM OF WRIST: CPT

## 2021-07-09 RX ORDER — ACETAMINOPHEN 500 MG
1000 TABLET ORAL ONCE
Status: COMPLETED | OUTPATIENT
Start: 2021-07-09 | End: 2021-07-09

## 2021-07-09 RX ADMIN — ACETAMINOPHEN 1000 MG: 500 TABLET ORAL at 19:33

## 2021-07-09 ASSESSMENT — PAIN SCALES - GENERAL
PAINLEVEL_OUTOF10: 4
PAINLEVEL_OUTOF10: 6

## 2021-07-09 NOTE — ED PROVIDER NOTES
I independently performed a history and physical on Bhargavi Basurto. All diagnostic, treatment, and disposition decisions were made by myself in conjunction with the advanced practice provider. For further details of [address] [place] [address] emergency department encounter, please see BEAU Castillo's documentation. Patient was evaluated due to falling the previous morning and ultimately landing on his face and having some facial discomfort since that time along with some right foot pain. He denied any actual toe pain and stated the foot pain was more in the proximal portion of his foot. He was reportedly not acting completely like himself (\"groggy\" per family) earlier today which is what prompted his son to get him checked out at the emergency department. He currently denies any headache or neck pain. No chest pain or shortness of breath. No abdominal pain. No vomiting. He is back to his normal baseline mentation per son. He denies any urinary complaints. No reported fever. No numbness or weakness on one side of the body. He uses a walker at home. Due to concern for the facial/head trauma from yesterday, he came to the ED for further assessment. He denies any loss of consciousness. He did break his upper dentures during the fall yesterday    Physical:   Gen: No acute distress. AOx3.   Psych: Normal mood and affect  HEENT: NC, EOMI, PERRL, MMM, abrasions to face noted   Neck: supple, normal ROM, NTTP  Cardiac: RRR, pulses 2+ in all 4 extremities  Lungs: C2AB, no R/R/W  Abdomen: soft and nontender with no R/D/G  Neuro: no focal neuro deficits with strength and sensation 5/5 in all 4 extremities  MSK: Normal range of motion of bilateral shoulders, elbows, wrists, hips, knees, ankles and nontender to palpation of all joints including to left wrist and right great toe, mild tenderness to right medial foot on exam not involving distal foot or toes   Back: no midline or paraspinal tenderness  Skin: no lacerations seen       The Ekg interpreted by me shows  Sinus bradycardia with first-degree AV block with a rate of 51  Axis is   Left axis deviation  QTc is  within an acceptable range     ST Segments: nonspecific changes  No significant change from prior EKG dated - no old EKG  No STEMI, RBBB noted on EKG, no signs of Brugada syndrome           MDM: Patient was evaluated due to concern for fall from yesterday where he reportedly tripped and fell landing on his face. Per radiology, no intracranial hemorrhage or cervical spine fracture. He denies any chest pain or abdominal pain. Foot x-ray mention possible fracture although he had no pain at the site and this is most likely from an old injury based on examination. No sign of any wrist subluxation or pain during my evaluation of the left wrist.  When I was in the room his heart rate did drop into the low 40s and I did talk to the patient and his family that this could also cause falls and may be he was acting somewhat abnormal today due to something beyond just the concussion. I did recommend blood work since the patient is aware if it was related to a cardiac issue or infection, this could lead to severe disability or death. He had full mental capacity to make his own medical decisions and was alert and oriented x4. At this time, he is declining blood work and states he feels fine and wants to go home. His son is also aware I recommended blood work and the patient at this point is declining. His son is aware that if he does become more confused or develops any fever or confusion or lightheadedness or chest pain, then return immediately to the ED for further assessment, but otherwise he plans to follow-up with his primary doctor this upcoming week for repeat assessment to ensure he is doing well.   He was in no acute distress at time of discharge and the patient and son felt comfortable with this plan and again at this time are exercising informed refusal in regards to further work-up related to the bradycardia since I did state that he ultimately could need to be admitted related to this and the patient refused admission at this point and just wanted to go home.        Raheel Diego MD  07/10/21 9109

## 2021-07-09 NOTE — ED NOTES
Bed: 11  Expected date:   Expected time:   Means of arrival:   Comments:     Franklyn Newton RN  07/09/21 2644

## 2021-07-09 NOTE — ED PROVIDER NOTES
[facility]  ED  EMERGENCY DEPARTMENT ENCOUNTER        Pt Name: Tatiana Marrero  MRN: [id number]  [place] 1940  Date of evaluation: 7/9/2021  Provider: BEAU Martin  PCP: Jm Souza    This patient was seen and evaluated by the attending physician Malorie Ly       Chief Complaint   Patient presents with    Fall     pt reports mechanical fall yesterday while going to breakfast. pt reports that step was unusually high. pt fell forward, hitting face on concrete. pt denies LOC. unsure on blood thinner. pt reports bilat hand pain and sts that \"family thinks I'm groggy\". pt is A&O in triage. has abrasions to nose and lip. bleeding controlled. Pt reports that he fractured all of his upper dentures when this happened       HISTORY OF PRESENT ILLNESS   (Location/Symptom, Timing/Onset, Context/Setting, Quality, Duration, Modifying Factors, Severity)  Note limiting factors. Tatiana Marrero is a [de-identified] y.o. male with past medical history of coronary artery disease that is post CABG hypertension hyperlipidemia osteoarthritis, COPD, stage III chronic kidney disease,, on aspirin 81 mg who presents via private vehicle from his home for evaluation of injury after fall. Yesterday around 9 AM he was going to frishes with a friend and he tripped going up the step, he crossed both his arms around his chest and fell forward, hitting his chest and head. He denies any loss of consciousness, he denies significant headache. He denies feeling lightheaded or prodromal symptoms prior to tripping and falling. He notes the step was higher than he anticipated which resulted in the fall. His friend helped him up off the ground and he left freshest and went home where he rested the rest of the day. He developed some soreness mostly to the chest and his left arm, and his knees are always in pain 2/2 arthritis. His family notes that he was having hard time operating the TV remote. Patient's family called his PCP and his PCP told him to come straight to the ER for evaluation of his head. Patient denies acting or feeling confused. Family notes that he is not been significantly confused but the remote event concerned them. No nausea no vomiting no abdominal pain. No headaches. No vision changes but he notes when he came into the ER the brown door looked red for a moment, this was transient. No nosebleeds but he did fall on his face and crack his dentures. No other dental injury. He denies neck pain. He denies numbness, tingling weakness to the UE or LE. He normally ambulates with a cane/walker. Nursing Notes were all reviewed and agreed with or any disagreements were addressed  in the HPI. Pt was seen during the [place] 19 pandemic. Appropriate PPE worn by ME during patient encounters. Pt seen during a time with constrained hospital bed capacity and other potential inpatient and outpatient resources were constrained due to the viral pandemic. REVIEW OF SYSTEMS    (2-9 systems for level 4, 10 or more for level 5)     Review of Systems    Positives and Pertinent negatives as per HPI. Except as noted abovein the ROS, all other systems were reviewed and negative.        PAST MEDICAL HISTORY     Past Medical History:   Diagnosis Date    Arthritis     Asthma     COPD    CAD (coronary artery disease)     S/P CABG    Gastric bypass status for obesity     Hyperlipidemia     Hypertension     Low kidney function     Osteoarthritis     Thyroid disease          SURGICAL HISTORY     Past Surgical History:   Procedure Laterality Date    COLECTOMY  2003    COLONOSCOPY  5/11    diverticulosis    COLONOSCOPY  5/6/2016    Colon Ulcer    COLONOSCOPY N/A 6/21/2020    COLONOSCOPY DIAGNOSTIC performed by Ancelmo Sauceda MD at [address] [place] [address]  2007    3 VESSEL    COSMETIC SURGERY      removed skin from stomach    FOOT SURGERY Bilateral     toenails removed [address]    HERNIA REPAIR  0444    umbilical    NASAL SEPTUM SURGERY  1995    SHOULDER ARTHROSCOPY Right 9/8/15    rtc repair   [facility]    vocal cord     UPPER GASTROINTESTINAL ENDOSCOPY N/A 6/22/2020    EGD BIOPSY performed by Corrine Huerta MD at [place] [address].       Discharge Medication List as of 7/9/2021  9:18 PM      CONTINUE these medications which have NOT CHANGED    Details   aspirin EC 81 MG EC tablet Take 1 tablet by mouth daily, Disp-90 tablet, R-1Normal      pantoprazole (PROTONIX) 40 MG tablet Take 1 tablet by mouth every morning (before breakfast), Disp-30 tablet, R-0Normal      Cholecalciferol (VITAMIN D-3 PO) Take by mouthHistorical Med      Coenzyme Q10 (COQ10 PO) Take by mouthHistorical Med      Biotin 5000 MCG TABS Take by mouthHistorical Med      lovastatin (MEVACOR) 20 MG tablet Take 20 mg by mouth nightlyHistorical Med      BACLOFEN PO Take by mouthHistorical Med      IRON CR PO Take by mouth daily      Multiple Vitamins-Minerals (THERAPEUTIC MULTIVITAMIN-MINERALS) tablet Take 1 tablet by mouth daily      Cyanocobalamin (VITAMIN B-12 CR PO) Take by mouth daily      Ascorbic Acid (VITAMIN C) 500 MG tablet Take 500 mg by mouth daily      albuterol (PROVENTIL) (2.5 MG/3ML) 0.083% nebulizer solution Take 2.5 mg by nebulization every 6 hours as needed for Wheezing      nitroGLYCERIN (NITROSTAT) 0.4 MG SL tablet Place 0.4 mg under the tongue every 5 minutes as needed. metoprolol (LOPRESSOR) 50 MG tablet Take 50 mg by mouth 2 times daily. levothyroxine (SYNTHROID) 100 MCG tablet Take 50 mcg by mouth daily. montelukast (SINGULAIR) 10 MG tablet Take 10 mg by mouth nightly. albuterol (PROVENTIL HFA;VENTOLIN HFA) 108 (90 BASE) MCG/ACT inhaler Inhale 2 puffs into the lungs every 6 hours as needed.                  ALLERGIES     Seasonal and Clindamycin hcl    FAMILYHISTORY       Family History   Problem Relation Age of Onset    Cancer Maternal Grandmother           SOCIAL HISTORY       Social History     Socioeconomic History    Marital status:      Spouse name: None    Number of children: None    Years of education: None    Highest education level: None   Occupational History    None   Tobacco Use    Smoking status: Current Every Day Smoker     Packs/day: 1.00     Years: 20.00     Pack years: 20.00     Types: Cigarettes    Smokeless tobacco: Never Used    Tobacco comment: plans on quitting 9/2016 when he gets a total knee   Substance and Sexual Activity    Alcohol use: No     Comment: pt poor historian    Drug use: None    Sexual activity: None   Other Topics Concern    None   Social History Narrative    None     Social Determinants of Health     Financial Resource Strain:     Difficulty of Paying Living Expenses:    Food Insecurity:     Worried About Running Out of Food in the Last Year:     Ran Out of Food in the Last Year:    Transportation Needs:     Lack of Transportation (Medical):  Lack of Transportation (Non-Medical):    Physical Activity:     Days of Exercise per Week:     Minutes of Exercise per Session:    Stress:     Feeling of Stress :    Social Connections:     Frequency of Communication with Friends and Family:     Frequency of Social Gatherings with Friends and Family:     Attends [employer] Services:     Active Member of Clubs or Organizations:     Attends Club or Organization Meetings:     Marital Status:    Intimate Partner Violence:     Fear of Current or Ex-Partner:     Emotionally Abused:     Physically Abused:     Sexually Abused:        SCREENINGS             PHYSICAL EXAM    (up to 7 for level 4, 8 or more for level 5)     ED Triage Vitals [07/09/21 1410]   BP Temp Temp Source Pulse Resp SpO2 Height Weight   (!) 152/63 98 °F (36.7 °C) Oral 56 16 95 % 5' 7\" (1.702 m) 262 lb (118.8 kg)       Physical Exam  Vitals and nursing note reviewed. Constitutional:       General: He is awake. He is not in acute distress. Appearance: Normal appearance. He is well-developed and well-groomed. He is not ill-appearing, toxic-appearing or diaphoretic. HENT:      Head: Normocephalic. Contusion present. No raccoon eyes, Tolentino's sign, abrasion, masses, right periorbital erythema, left periorbital erythema or laceration. Jaw: There is normal jaw occlusion. No pain on movement. Comments: Mild discomfort with palpation of the facial bones without bony stepoff, crepitus or any point tenderness of severe pain      Right Ear: Hearing, tympanic membrane, ear canal and external ear normal. There is impacted cerumen. Left Ear: Hearing, tympanic membrane, ear canal and external ear normal. There is impacted cerumen. Nose: Signs of injury present. No nasal deformity or laceration. Right Nostril: No epistaxis or septal hematoma. Left Nostril: No epistaxis or septal hematoma. Right Sinus: No maxillary sinus tenderness or frontal sinus tenderness. Left Sinus: No maxillary sinus tenderness or frontal sinus tenderness. Mouth/Throat:      Lips: Pink. Mouth: Mucous membranes are moist. No injury. Pharynx: Oropharynx is clear. Uvula midline. Eyes:      General: Lids are normal. Vision grossly intact. Gaze aligned appropriately. No visual field deficit. Right eye: No discharge. Left eye: No discharge. Extraocular Movements: Extraocular movements intact. Right eye: No nystagmus. Left eye: No nystagmus. Conjunctiva/sclera: Conjunctivae normal.      Right eye: No hemorrhage. Left eye: No hemorrhage. Pupils: Pupils are equal, round, and reactive to light.       Comments: No gross hemorrhage on fundoscopic exam.   No gross large hyphema appreciated on macroscopic exam    Neck:      Trachea: Trachea and phonation normal.   Cardiovascular:      Rate and Rhythm: Normal rate and regular and left dorsal feet without signficant swelling. Ankle ROM intact and symmetric. Skin:     General: Skin is warm and dry. Capillary Refill: Capillary refill takes less than 2 seconds. Coloration: Skin is not pale. Findings: No abrasion, bruising, ecchymosis, signs of injury or laceration. Neurological:      General: No focal deficit present. Mental Status: He is alert, oriented to person, place, and time and easily aroused. GCS: GCS eye subscore is 4. GCS verbal subscore is 5. GCS motor subscore is 6. Cranial Nerves: Cranial nerves are intact. No facial asymmetry. Sensory: Sensation is intact. Motor: Motor function is intact. No weakness, abnormal muscle tone or pronator drift. Coordination: Coordination is intact. Coordination normal. Finger-Nose-Finger Test normal. Rapid alternating movements normal.      Comments: Mental Status:   Oriented to person, place, problem, and time.    Memory: Aware of recent and remote event. Good immediate recall.  Intact remote memory  Normal attention span and concentration. Language: intact naming, repeating and fluency   Good fund of Knowledge. Aware of current events and vocabulary     Gait/Posture: steady gait and normal posturing and station. No truncal ataxia. Psychiatric:         Attention and Perception: Attention and perception normal.         Behavior: Behavior normal. Behavior is cooperative. Cognition and Memory: Cognition and memory normal.         DIAGNOSTIC RESULTS   LABS:    Labs Reviewed - No data to display    All other labs were within normal range or not returned as of this dictation. EKG: All EKG's are interpreted by the Emergency Department Physician who either signs orCo-signs this chart in the absence of a cardiologist.  Please see their note for interpretation of EKG.       RADIOLOGY:   Non-plain film images such as CT, Ultrasound and MRI are read by the radiologist. Plain radiographic images are visualized andpreliminarily interpreted by the  ED Provider with the below findings:        Interpretation perthe Radiologist below, if available at the time of this note:    CT FACIAL BONES WO CONTRAST   Final Result   No evidence of acute facial bone fracture, temporomandibular joint   dislocation, or orbital abnormality. XR FOOT LEFT (2 VIEWS)   Final Result   No acute fracture or dislocation on these views. XR FOOT RIGHT (2 VIEWS)   Final Result   Fracture along the lateral 1st distal phalanx of uncertain chronicity. XR KNEE RIGHT (1-2 VIEWS)   Final Result   No acute fracture or malalignment in the right knee. XR HIP BILATERAL W AP PELVIS (2 VIEWS)   Final Result   No acute fracture in the bilateral hips or pelvis. XR KNEE LEFT (1-2 VIEWS)   Final Result   No acute fracture or dislocation. Other findings as described. XR WRIST LEFT (MIN 3 VIEWS)   Final Result   Suboptimal evaluation of the scaphoid. Otherwise, no acute fracture. Dorsal position of the distal ulna may be projectional.  Correlate with   presentation to exclude subluxation. XR ELBOW LEFT (MIN 3 VIEWS)   Final Result   No acute fracture or dislocation. XR CHEST (2 VW)   Final Result   Nonspecific lingular opacity. CT Head WO Contrast   Final Result   No acute intracranial abnormality. CT CERVICAL SPINE WO CONTRAST   Final Result   Stable reversal of the normal cervical lordosis, without acute fracture or   subluxation of the cervical spine. No results found.        PROCEDURES   Unless otherwise noted below, none     Procedures    CRITICAL CARE TIME   N/A    CONSULTS:  None      EMERGENCY DEPARTMENT COURSE and DIFFERENTIALDIAGNOSIS/MDM:   Vitals:    Vitals:    07/09/21 1908 07/09/21 1933 07/09/21 2103 07/09/21 2132   BP: 139/74 (!) 147/75 136/76 (!) 153/70   Pulse: 51 (!) 49 55 50   Resp: 18 16 18 16   Temp:       TempSrc:       SpO2: 93% 96% 96% 99% Weight:       Height:           Patient was given thefollowing medications:  Medications   acetaminophen (TYLENOL) tablet 1,000 mg (1,000 mg Oral Given 7/9/21 1933)       PDMP Monitoring:    Last PDMP [employer] as Reviewed [facility]):  Review User Review Instant Review Result          Last Controlled Substance Monitoring Documentation      Office Visit from 7/31/2015 in [facility]  The Prescription Monitoring Report for this patient was reviewed today. filed at 07/31/2015 1222   Periodic Controlled Substance Monitoring  No signs of potential drug abuse or diversion identified. filed at 07/31/2015 1222        Urine Drug Screenings (1 yr)    No resulted procedures found. Medication Contract and Consent for Opioid Use Documents Filed      No documents found                MDM:   Patient seen and evaluated. Old records reviewed. Diagnostic testing reviewed and results discussed. Pt is a very pleasant [de-identified] yo male who presents for evaluation after fall yesterday. On exam He is alert, oriented, afebrile, well perfused and hemodynamically stable. He has some evidence of trauma to the face without significant facial tenderness, step off or bleeding. He has no tenderness to the bony spine however will obtain C spine imaging with head given extreme of age. He has some bruising along the left forearm but intact rom at the elbows and wrists. He has LE exam of tenderness to bilateral dorsal feet and bilateral knee pain with abrasions to fronts of both knees. CT head, facial bones, c spine are negative for acute intracranial abnormality or fracture. CXR negative for PTX, SHYAM or gross rib fx. XR left elbow, bilateral knees, bilateral hips, left foot are negative for acute fracture or dislocation. XR left wrist ? Subluxation of the distal radius however this is not consistent with exam and I believe ghis was secondary to positioning. XR right goot reveals distal first phalnx fracture. He will be placed in a boot and may continue to use his walker. He will be given referral to orthopedics for fracture management and folow up on other soft tissue injuries. EKG reveals sinus bradycardia. He appears to typically present bradycardic. I have low concern that he had acute cardiopulmonary cause to his fall as it was witnessed and reported to be purely mechanical. My attending saw this patient in conjunction with myself and suggested basic labwork be obtained however patient would like to go home and would not like additional testing done. Informed discharge- pt given strict return precautions. Based on patient's clinical history and clinical findings, and absence of peritonitis, sepsis, or toxicity I currently estimate there is low risk for: Intracranial hemorrhage, intra-abdominal injury, perforated bowel, subdural hematoma, AAA, TAA, cauda equina or central cord syndrome, compartment syndrome, epidural mass or lesion, herniated disc causing severe stenosis, tendon or NV injury, fracture or dislocation. We have discussed the symptoms which are most concerning (e.g., bloody stool, fever, changing or worsening pain, vomiting) that necessitate immediate return. Pt is in agreement with the current plan and all questions were addressed. Discharge Time out:  CC Reviewed Yes   Test Results Yes     Vitals:    07/09/21 2132   BP: (!) 153/70   Pulse: 50   Resp: 16   Temp:    SpO2: 99%              FINAL IMPRESSION      1. Fall, initial encounter    2. Closed head injury, initial encounter    3. Closed nondisplaced fracture of distal phalanx of right great toe, initial encounter          DISPOSITION/PLAN   DISPOSITION Decision To Discharge 07/09/2021 08:58:27 PM      PATIENT REFERREDTO:  Harpreet Zapien [facility]  [address] [facility].   [address] [place] [address]    Schedule an appointment as soon as possible for a visit   For a recheck in   1-2  days    [facility]  ED  [address] [place] [address]  Go to   If symptoms worsen    Fabio Atkins MD  [address] [facility] [address] [facility]  [phone]    Schedule an appointment as soon as possible for a visit         DISCHARGE MEDICATIONS:  Discharge Medication List as of 7/9/2021  9:18 PM          DISCONTINUED MEDICATIONS:  Discharge Medication List as of 7/9/2021  9:18 PM                 (Please note that portions ofthis note were completed with a voice recognition program.  Efforts were made to edit the dictations but occasionally words are mis-transcribed.)    BEAU Vazquez (electronically signed)       BEAU Vazquez  07/11/21 9793

## 2021-07-10 LAB
EKG ATRIAL RATE: 51 BPM
EKG DIAGNOSIS: NORMAL
EKG P AXIS: 31 DEGREES
EKG P-R INTERVAL: 214 MS
EKG Q-T INTERVAL: 538 MS
EKG QRS DURATION: 138 MS
EKG QTC CALCULATION (BAZETT): 495 MS
EKG R AXIS: -73 DEGREES
EKG T AXIS: 21 DEGREES
EKG VENTRICULAR RATE: 51 BPM

## 2021-07-10 PROCEDURE — 93010 ELECTROCARDIOGRAM REPORT: CPT | Performed by: INTERNAL MEDICINE

## 2021-07-11 ASSESSMENT — VISUAL ACUITY: OU: 1

## 2021-08-17 ENCOUNTER — HOSPITAL ENCOUNTER (INPATIENT)
Age: 81
LOS: 3 days | Discharge: HOME | DRG: 378 | End: 2021-08-20
Attending: EMERGENCY MEDICINE | Admitting: INTERNAL MEDICINE
Payer: MEDICARE

## 2021-08-17 DIAGNOSIS — K92.2 LOWER GI BLEED: Primary | ICD-10-CM

## 2021-08-17 PROBLEM — I25.10 CAD (CORONARY ARTERY DISEASE): Status: ACTIVE | Noted: 2021-08-17

## 2021-08-17 PROBLEM — D64.9 ANEMIA: Status: ACTIVE | Noted: 2021-08-17

## 2021-08-17 PROBLEM — E78.5 HYPERLIPIDEMIA: Status: ACTIVE | Noted: 2021-08-17

## 2021-08-17 PROBLEM — E87.5 HYPERKALEMIA: Status: ACTIVE | Noted: 2021-08-17

## 2021-08-17 PROBLEM — E03.9 HYPOTHYROID: Status: ACTIVE | Noted: 2021-08-17

## 2021-08-17 PROBLEM — E66.9 OBESITY: Status: ACTIVE | Noted: 2021-08-17

## 2021-08-17 PROBLEM — N17.9 ARF (ACUTE RENAL FAILURE) (HCC): Status: ACTIVE | Noted: 2021-08-17

## 2021-08-17 PROBLEM — I10 HTN (HYPERTENSION): Status: ACTIVE | Noted: 2021-08-17

## 2021-08-17 LAB
A/G RATIO: 0.9 (ref 1.1–2.2)
ABO/RH: NORMAL
ALBUMIN SERPL-MCNC: 2.8 G/DL (ref 3.4–5)
ALP BLD-CCNC: 90 U/L (ref 40–129)
ALT SERPL-CCNC: 23 U/L (ref 10–40)
ANION GAP SERPL CALCULATED.3IONS-SCNC: 10 MMOL/L (ref 3–16)
ANTIBODY SCREEN: NORMAL
APTT: 32 SEC (ref 26.2–38.6)
AST SERPL-CCNC: 28 U/L (ref 15–37)
BASOPHILS ABSOLUTE: 0 K/UL (ref 0–0.2)
BASOPHILS RELATIVE PERCENT: 0.7 %
BILIRUB SERPL-MCNC: 0.5 MG/DL (ref 0–1)
BUN BLDV-MCNC: 31 MG/DL (ref 7–20)
CALCIUM SERPL-MCNC: 7.7 MG/DL (ref 8.3–10.6)
CHLORIDE BLD-SCNC: 104 MMOL/L (ref 99–110)
CO2: 21 MMOL/L (ref 21–32)
CREAT SERPL-MCNC: 1.7 MG/DL (ref 0.8–1.3)
EOSINOPHILS ABSOLUTE: 0.3 K/UL (ref 0–0.6)
EOSINOPHILS RELATIVE PERCENT: 4.9 %
GFR AFRICAN AMERICAN: 47
GFR NON-AFRICAN AMERICAN: 39
GLOBULIN: 3 G/DL
GLUCOSE BLD-MCNC: 212 MG/DL (ref 70–99)
HCT VFR BLD CALC: 29.8 % (ref 40.5–52.5)
HCT VFR BLD CALC: 31.1 % (ref 40.5–52.5)
HCT VFR BLD CALC: 31.8 % (ref 40.5–52.5)
HCT VFR BLD CALC: 32.2 % (ref 40.5–52.5)
HEMOGLOBIN: 10 G/DL (ref 13.5–17.5)
HEMOGLOBIN: 10.5 G/DL (ref 13.5–17.5)
HEMOGLOBIN: 10.8 G/DL (ref 13.5–17.5)
HEMOGLOBIN: 10.9 G/DL (ref 13.5–17.5)
INR BLD: 1.25 (ref 0.88–1.12)
LACTIC ACID, SEPSIS: 1.3 MMOL/L (ref 0.4–1.9)
LYMPHOCYTES ABSOLUTE: 0.9 K/UL (ref 1–5.1)
LYMPHOCYTES RELATIVE PERCENT: 13.2 %
MCH RBC QN AUTO: 33.3 PG (ref 26–34)
MCHC RBC AUTO-ENTMCNC: 34.1 G/DL (ref 31–36)
MCV RBC AUTO: 97.7 FL (ref 80–100)
MONOCYTES ABSOLUTE: 0.5 K/UL (ref 0–1.3)
MONOCYTES RELATIVE PERCENT: 7.5 %
NEUTROPHILS ABSOLUTE: 4.9 K/UL (ref 1.7–7.7)
NEUTROPHILS RELATIVE PERCENT: 73.7 %
PDW BLD-RTO: 14.6 % (ref 12.4–15.4)
PLATELET # BLD: 180 K/UL (ref 135–450)
PMV BLD AUTO: 8.4 FL (ref 5–10.5)
POTASSIUM SERPL-SCNC: 5.4 MMOL/L (ref 3.5–5.1)
PROTHROMBIN TIME: 14.3 SEC (ref 9.9–12.7)
RBC # BLD: 3.26 M/UL (ref 4.2–5.9)
SODIUM BLD-SCNC: 135 MMOL/L (ref 136–145)
TOTAL PROTEIN: 5.8 G/DL (ref 6.4–8.2)
WBC # BLD: 6.7 K/UL (ref 4–11)

## 2021-08-17 PROCEDURE — 6370000000 HC RX 637 (ALT 250 FOR IP): Performed by: INTERNAL MEDICINE

## 2021-08-17 PROCEDURE — 80053 COMPREHEN METABOLIC PANEL: CPT

## 2021-08-17 PROCEDURE — 83605 ASSAY OF LACTIC ACID: CPT

## 2021-08-17 PROCEDURE — 1200000000 HC SEMI PRIVATE

## 2021-08-17 PROCEDURE — 85610 PROTHROMBIN TIME: CPT

## 2021-08-17 PROCEDURE — 85014 HEMATOCRIT: CPT

## 2021-08-17 PROCEDURE — C9113 INJ PANTOPRAZOLE SODIUM, VIA: HCPCS | Performed by: EMERGENCY MEDICINE

## 2021-08-17 PROCEDURE — 85025 COMPLETE CBC W/AUTO DIFF WBC: CPT

## 2021-08-17 PROCEDURE — 2580000003 HC RX 258: Performed by: INTERNAL MEDICINE

## 2021-08-17 PROCEDURE — 96374 THER/PROPH/DIAG INJ IV PUSH: CPT

## 2021-08-17 PROCEDURE — 2580000003 HC RX 258: Performed by: EMERGENCY MEDICINE

## 2021-08-17 PROCEDURE — C9113 INJ PANTOPRAZOLE SODIUM, VIA: HCPCS | Performed by: INTERNAL MEDICINE

## 2021-08-17 PROCEDURE — 86901 BLOOD TYPING SEROLOGIC RH(D): CPT

## 2021-08-17 PROCEDURE — 6360000002 HC RX W HCPCS: Performed by: EMERGENCY MEDICINE

## 2021-08-17 PROCEDURE — 85018 HEMOGLOBIN: CPT

## 2021-08-17 PROCEDURE — 36415 COLL VENOUS BLD VENIPUNCTURE: CPT

## 2021-08-17 PROCEDURE — 86850 RBC ANTIBODY SCREEN: CPT

## 2021-08-17 PROCEDURE — 6360000002 HC RX W HCPCS: Performed by: INTERNAL MEDICINE

## 2021-08-17 PROCEDURE — 36430 TRANSFUSION BLD/BLD COMPNT: CPT

## 2021-08-17 PROCEDURE — 99284 EMERGENCY DEPT VISIT MOD MDM: CPT

## 2021-08-17 PROCEDURE — 86900 BLOOD TYPING SEROLOGIC ABO: CPT

## 2021-08-17 PROCEDURE — P9016 RBC LEUKOCYTES REDUCED: HCPCS

## 2021-08-17 PROCEDURE — 86923 COMPATIBILITY TEST ELECTRIC: CPT

## 2021-08-17 PROCEDURE — 85730 THROMBOPLASTIN TIME PARTIAL: CPT

## 2021-08-17 RX ORDER — 0.9 % SODIUM CHLORIDE 0.9 %
1000 INTRAVENOUS SOLUTION INTRAVENOUS ONCE
Status: COMPLETED | OUTPATIENT
Start: 2021-08-17 | End: 2021-08-17

## 2021-08-17 RX ORDER — ALBUTEROL SULFATE 2.5 MG/3ML
2.5 SOLUTION RESPIRATORY (INHALATION) EVERY 6 HOURS PRN
Status: DISCONTINUED | OUTPATIENT
Start: 2021-08-17 | End: 2021-08-20 | Stop reason: HOSPADM

## 2021-08-17 RX ORDER — ONDANSETRON 2 MG/ML
4 INJECTION INTRAMUSCULAR; INTRAVENOUS EVERY 6 HOURS PRN
Status: DISCONTINUED | OUTPATIENT
Start: 2021-08-17 | End: 2021-08-20 | Stop reason: HOSPADM

## 2021-08-17 RX ORDER — ONDANSETRON 4 MG/1
4 TABLET, ORALLY DISINTEGRATING ORAL EVERY 8 HOURS PRN
Status: DISCONTINUED | OUTPATIENT
Start: 2021-08-17 | End: 2021-08-20 | Stop reason: HOSPADM

## 2021-08-17 RX ORDER — ACETAMINOPHEN 650 MG/1
650 SUPPOSITORY RECTAL EVERY 6 HOURS PRN
Status: DISCONTINUED | OUTPATIENT
Start: 2021-08-17 | End: 2021-08-20 | Stop reason: HOSPADM

## 2021-08-17 RX ORDER — PANTOPRAZOLE SODIUM 40 MG/10ML
40 INJECTION, POWDER, LYOPHILIZED, FOR SOLUTION INTRAVENOUS 2 TIMES DAILY
Status: DISCONTINUED | OUTPATIENT
Start: 2021-08-17 | End: 2021-08-20 | Stop reason: HOSPADM

## 2021-08-17 RX ORDER — SODIUM CHLORIDE 0.9 % (FLUSH) 0.9 %
5-40 SYRINGE (ML) INJECTION EVERY 12 HOURS SCHEDULED
Status: DISCONTINUED | OUTPATIENT
Start: 2021-08-17 | End: 2021-08-20 | Stop reason: HOSPADM

## 2021-08-17 RX ORDER — ALBUTEROL SULFATE 90 UG/1
2 AEROSOL, METERED RESPIRATORY (INHALATION) EVERY 6 HOURS PRN
Status: DISCONTINUED | OUTPATIENT
Start: 2021-08-17 | End: 2021-08-17

## 2021-08-17 RX ORDER — ATORVASTATIN CALCIUM 10 MG/1
10 TABLET, FILM COATED ORAL DAILY
Status: DISCONTINUED | OUTPATIENT
Start: 2021-08-18 | End: 2021-08-20 | Stop reason: HOSPADM

## 2021-08-17 RX ORDER — SODIUM CHLORIDE 9 MG/ML
25 INJECTION, SOLUTION INTRAVENOUS PRN
Status: DISCONTINUED | OUTPATIENT
Start: 2021-08-17 | End: 2021-08-20 | Stop reason: HOSPADM

## 2021-08-17 RX ORDER — MONTELUKAST SODIUM 10 MG/1
10 TABLET ORAL NIGHTLY
Status: DISCONTINUED | OUTPATIENT
Start: 2021-08-17 | End: 2021-08-20 | Stop reason: HOSPADM

## 2021-08-17 RX ORDER — LEVOTHYROXINE SODIUM 0.05 MG/1
50 TABLET ORAL DAILY
Status: DISCONTINUED | OUTPATIENT
Start: 2021-08-18 | End: 2021-08-18

## 2021-08-17 RX ORDER — ASPIRIN 81 MG/1
81 TABLET ORAL DAILY
Status: DISCONTINUED | OUTPATIENT
Start: 2021-08-17 | End: 2021-08-20 | Stop reason: HOSPADM

## 2021-08-17 RX ORDER — POLYETHYLENE GLYCOL 3350 17 G/17G
17 POWDER, FOR SOLUTION ORAL DAILY PRN
Status: DISCONTINUED | OUTPATIENT
Start: 2021-08-17 | End: 2021-08-20 | Stop reason: HOSPADM

## 2021-08-17 RX ORDER — SODIUM CHLORIDE 9 MG/ML
INJECTION, SOLUTION INTRAVENOUS PRN
Status: DISCONTINUED | OUTPATIENT
Start: 2021-08-17 | End: 2021-08-20 | Stop reason: HOSPADM

## 2021-08-17 RX ORDER — PANTOPRAZOLE SODIUM 40 MG/10ML
80 INJECTION, POWDER, LYOPHILIZED, FOR SOLUTION INTRAVENOUS ONCE
Status: COMPLETED | OUTPATIENT
Start: 2021-08-17 | End: 2021-08-17

## 2021-08-17 RX ORDER — ACETAMINOPHEN 325 MG/1
650 TABLET ORAL EVERY 6 HOURS PRN
Status: DISCONTINUED | OUTPATIENT
Start: 2021-08-17 | End: 2021-08-20 | Stop reason: HOSPADM

## 2021-08-17 RX ORDER — SODIUM CHLORIDE 0.9 % (FLUSH) 0.9 %
5-40 SYRINGE (ML) INJECTION PRN
Status: DISCONTINUED | OUTPATIENT
Start: 2021-08-17 | End: 2021-08-20 | Stop reason: HOSPADM

## 2021-08-17 RX ORDER — SODIUM CHLORIDE 9 MG/ML
INJECTION, SOLUTION INTRAVENOUS CONTINUOUS
Status: ACTIVE | OUTPATIENT
Start: 2021-08-17 | End: 2021-08-18

## 2021-08-17 RX ADMIN — SODIUM CHLORIDE, PRESERVATIVE FREE 10 ML: 5 INJECTION INTRAVENOUS at 20:13

## 2021-08-17 RX ADMIN — SODIUM CHLORIDE: 9 INJECTION, SOLUTION INTRAVENOUS at 14:45

## 2021-08-17 RX ADMIN — PANTOPRAZOLE SODIUM 80 MG: 40 INJECTION, POWDER, FOR SOLUTION INTRAVENOUS at 07:59

## 2021-08-17 RX ADMIN — SODIUM CHLORIDE 1000 ML: 9 INJECTION, SOLUTION INTRAVENOUS at 07:09

## 2021-08-17 RX ADMIN — MONTELUKAST SODIUM 10 MG: 10 TABLET ORAL at 20:14

## 2021-08-17 RX ADMIN — SODIUM CHLORIDE: 9 INJECTION, SOLUTION INTRAVENOUS at 19:54

## 2021-08-17 RX ADMIN — PANTOPRAZOLE SODIUM 40 MG: 40 INJECTION, POWDER, FOR SOLUTION INTRAVENOUS at 20:14

## 2021-08-17 ASSESSMENT — PAIN SCALES - GENERAL: PAINLEVEL_OUTOF10: 0

## 2021-08-17 NOTE — ED NOTES
1987 - called [facility] per consult  Re: lower gi bleed  0808 - called [facility] again since no call back yet  0842 - Dr Meghana Soto called back to speak with Dr Lindsey Melara  08/17/21 7751

## 2021-08-17 NOTE — ED NOTES
Bed: 02  Expected date:   Expected time:   Means of arrival:   Comments:  vicky Aaron RN  08/17/21 0532

## 2021-08-17 NOTE — H&P
Hospital Medicine History & Physical      PCP: Moenphil    Date of Admission: 8/17/2021    Date of Service: Pt seen/examined on 17 August and Admitted to Inpatient with expected LOS greater than two midnights due to medical therapy. Chief Complaint:  Rectal bleeding      History Of Present Illness:        [de-identified] y.o. male w/ hx HTN/CAD s/p CABG only on ASA w/ prior hx of suspected lower GI bleed but non-bleeding peptic ulcer disease was found on workup w/out active source ever identified, who presented to [facility] with a 1 day hx of recurrent painless BRBPR/Clots    The patient denies any fever/chills or other signs/sxs of systemic illness or identifiable aggravating/alleviating factors. Past Medical History:          Diagnosis Date    Arthritis     Asthma     COPD    CAD (coronary artery disease)     S/P CABG    Gastric bypass status for obesity     Hyperlipidemia     Hypertension     Low kidney function     Osteoarthritis     Thyroid disease        Past Surgical History:          Procedure Laterality Date    COLECTOMY  2003    COLONOSCOPY  5/11    diverticulosis    COLONOSCOPY  5/6/2016    Colon Ulcer    COLONOSCOPY N/A 6/21/2020    COLONOSCOPY DIAGNOSTIC performed by Lupillo Young MD at [address] [place] [address]    3 VESSEL    COSMETIC SURGERY      removed skin from stomach    FOOT SURGERY Bilateral     toenails removed    GASTRIC BYPASS SURGERY  2001    HERNIA REPAIR  5881    umbilical    NASAL SEPTUM SURGERY  1995    SHOULDER ARTHROSCOPY Right 9/8/15    rtc repair    THROAT SURGERY  1995    vocal cord     UPPER GASTROINTESTINAL ENDOSCOPY N/A 6/22/2020    EGD BIOPSY performed by Lupillo Young MD at [address] [place] [address]       Medications Prior to Admission:      Prior to Admission medications    Medication Sig Start Date End Date Taking?  Authorizing Provider   aspirin EC 81 MG EC tablet Take 1 tablet by mouth daily 6/23/20  Yes Tim CHOUDHURY MD Qiana   pantoprazole (PROTONIX) 40 MG tablet Take 1 tablet by mouth every morning (before breakfast) 6/23/20  Yes Sergio Vargas MD   Cholecalciferol (VITAMIN D-3 PO) Take by mouth   Yes Historical Provider, MD   Coenzyme Q10 (COQ10 PO) Take by mouth   Yes Historical Provider, MD   Biotin 5000 MCG TABS Take by mouth   Yes Historical Provider, MD   lovastatin (MEVACOR) 20 MG tablet Take 20 mg by mouth nightly   Yes Historical Provider, MD   BACLOFEN PO Take by mouth   Yes Historical Provider, MD   IRON CR PO Take by mouth daily   Yes Historical Provider, MD   Multiple Vitamins-Minerals (THERAPEUTIC MULTIVITAMIN-MINERALS) tablet Take 1 tablet by mouth daily   Yes Historical Provider, MD   Cyanocobalamin (VITAMIN B-12 CR PO) Take by mouth daily   Yes Historical Provider, MD   Ascorbic Acid (VITAMIN C) 500 MG tablet Take 500 mg by mouth daily   Yes Historical Provider, MD   albuterol (PROVENTIL) (2.5 MG/3ML) 0.083% nebulizer solution Take 2.5 mg by nebulization every 6 hours as needed for Wheezing   Yes Historical Provider, MD   nitroGLYCERIN (NITROSTAT) 0.4 MG SL tablet Place 0.4 mg under the tongue every 5 minutes as needed. Yes Historical Provider, MD   metoprolol (LOPRESSOR) 50 MG tablet Take 50 mg by mouth 2 times daily. Yes Historical Provider, MD   levothyroxine (SYNTHROID) 100 MCG tablet Take 50 mcg by mouth daily. Yes Historical Provider, MD   montelukast (SINGULAIR) 10 MG tablet Take 10 mg by mouth nightly. Yes Historical Provider, MD   albuterol (PROVENTIL HFA;VENTOLIN HFA) 108 (90 BASE) MCG/ACT inhaler Inhale 2 puffs into the lungs every 6 hours as needed. Yes Historical Provider, MD       Allergies:  Seasonal and Clindamycin hcl    Social History:      The patient currently lives independently    TOBACCO:   reports that he has been smoking cigarettes. He has a 20.00 pack-year smoking history.  He has never used smokeless tobacco.  ETOH:   reports no history of alcohol use.      Family History:      Reviewed in detail and negative for DM, CAD, CVA. Positive as follows:        Problem Relation Age of Onset    Cancer Maternal Grandmother        REVIEW OF SYSTEMS:   Pertinent positives as noted in the HPI. All other systems reviewed and negative. PHYSICAL EXAM:    /66   Pulse 75   Temp 97.7 °F (36.5 °C)   Resp 20   Ht 5' 8\" (1.727 m)   Wt 260 lb (117.9 kg)   SpO2 94%   BMI 39.53 kg/m²     General appearance:  No apparent distress, appears stated age and cooperative. HEENT:  Normal cephalic, atraumatic without obvious deformity. Pupils equal, round, and reactive to light. Extra ocular muscles intact. Conjunctivae/corneas clear. Neck: Supple, with full range of motion. No jugular venous distention. Trachea midline. Respiratory:  Normal respiratory effort. Clear to auscultation, bilaterally without Rales/Wheezes/Rhonchi. Cardiovascular:  Regular rate and rhythm with normal S1/S2 without murmurs, rubs or gallops. Abdomen: Soft, non-tender, non-distended with normal bowel sounds. Musculoskeletal:  No clubbing, cyanosis or edema bilaterally. Full range of motion without deformity. Skin: Skin color, texture, turgor normal.  No rashes or lesions. Neurologic:  Neurovascularly intact without any focal sensory/motor deficits.  Cranial nerves: II-XII intact, grossly non-focal.  Psychiatric:  Alert and oriented, thought content appropriate, normal insight  Capillary Refill: Brisk,< 3 seconds   Peripheral Pulses: +2 palpable, equal bilaterally       CXR:  I have reviewed the CXR with the following interpretation: N/A  EKG:  I have reviewed the EKG with the following interpretation: N/A    Labs:     Recent Labs     08/17/21  0556 08/17/21  1130   WBC 6.7  --    HGB 10.9* 10.8*   HCT 31.8* 32.2*     --      Recent Labs     08/17/21  0556   *   K 5.4*      CO2 21   BUN 31*   CREATININE 1.7*   CALCIUM 7.7*     Recent Labs     08/17/21  0556   AST 28   ALT 23   BILITOT 0.5   ALKPHOS 90     Recent Labs     08/17/21  0556   INR 1.25*     No results for input(s): Sugar Noemieze in the last 72 hours. Urinalysis:      Lab Results   Component Value Date    NITRU NEGATIVE 01/31/2012    WBCUA Rare 01/31/2012    BACTERIA 2+ 01/31/2012    RBCUA 3-5 01/31/2012    BLOODU TRACE-INTACT 01/31/2012    SPECGRAV 1.025 01/31/2012    GLUCOSEU NEGATIVE 01/31/2012         ASSESSMENT:    Active Hospital Problems    Diagnosis Date Noted    Obesity [E66.9] 08/17/2021    Hyperkalemia [E87.5] 08/17/2021    ARF (acute renal failure) (HCC) [N17.9] 08/17/2021    Anemia [D64.9] 08/17/2021    HTN (hypertension) [I10] 08/17/2021    CAD (coronary artery disease) [I25.10] 08/17/2021    Hyperlipidemia [E78.5] 08/17/2021    Hypothyroid [E03.9] 08/17/2021    GI bleed [K92.2] 06/20/2020       PLAN:      GI Bleed - likely lower GI Bleed, though had hx of PUD. On IV Protonix. GI consulted from ED and appreciated in advance. NPO on IVF. Initially hypotensive but improving. Anemia - 2nd to acute GI blood loss w/out evidence of ongoing hemodynamically active bleeding/hemolysis. Asymptomatic w/out indication for transfusion. Follow serial labs. Reviewed and documented as above. HTN/CAD - w/ known CAD but no evidence of active signs/sxs of ischemia/failure. Hypotensive on arrival w/ home meds held w/ vitals reviewed and documented as above. HyperLipidemia - controlled on home Statin. Continue, w/ f/u and med adjustment w/ PCP    ARF - w/ elevated BUN/Cr ratio c/w pre-renal azotemia. Given IVF hydration and follow serial labs. Reviewed and documented as above. HyperKalemia - etiology clinically unable to determine. Follow serial labs. Reviewed and documented as above. HypoThyroid - clinically euthyroid on oral replacement therapy. Continue, w/ outpt monitoring as previously arranged. Obesity -  With Body mass index is 39.53 kg/m². Complicating assessment and treatment. Placing patient at risk for multiple co-morbidities as well as early death and contributing to the patient's presentation. Counseled on weight loss. DVT Prophylaxis: IPC  Diet: Diet NPO Exceptions are: Sips of Water with Meds  Code Status: Full Code      PT/OT Eval Status: not yet ordered. Dispo - Likely Thurs/Friday 19/20 August pending clinical course and subspecialty recs. Willie Chung MD    Thank you [facility] for the opportunity to be involved in this patient's care. If you have any questions or concerns please feel free to contact me at [phone].

## 2021-08-17 NOTE — ED NOTES
Patient up to bedside commode with RN assist x1. BM dark with bright red blood, medium sized, formed.           Ekaterina Akers RN  08/17/21 2977

## 2021-08-17 NOTE — ED PROVIDER NOTES
[facility]  ED  EMERGENCY DEPARTMENT ENCOUNTER      Pt Name: Tabby Peraza  MRN: [id number]  [place] 1940  Date of evaluation: 8/17/2021  Provider: Randall Madera MD    [address] [place] [address]       Chief Complaint   Patient presents with    Rectal Bleeding     bright red blood from rectum since 1am; multiple episodes          HISTORY OF PRESENT ILLNESS   (Location/Symptom, Timing/Onset, Context/Setting, Quality, Duration, Modifying Factors, Severity)  Note limiting factors. Tabby Peraza is a [de-identified] y.o. male with past medical history of hypertension, hyperlipidemia, coronary artery disease status post CABG and prior gastric bypass here for bloody stool    Patient states he was using the restroom last night and began to notice that he was passing bright red blood. States he had multiple bowel movements passing just rate blood and blood clot. States he feels weak tired and dizzy. No abdominal pain, no nausea or vomiting. Denies fevers or chills. No gross diarrhea. Notes he had similar symptoms just over a year ago where he was admitted to the hospital, transfused blood products, and had to have a colonoscopy. Review of the medical chart shows that he was found to have presumed lower GI bleed from a diverticular source, but also was found to have a nonbleeding gastric ulcer. HPI    Nursing Notes were reviewed. REVIEW OF SYSTEMS    (2-9 systems for level 4, 10 or more for level 5)     Review of Systems    Please see HPI for pertinent positive and negative review of system findings. A full 10 system ROS was performed and otherwise negative.         PAST MEDICAL HISTORY     Past Medical History:   Diagnosis Date    Arthritis     Asthma     COPD    CAD (coronary artery disease)     S/P CABG    Gastric bypass status for obesity     Hyperlipidemia     Hypertension     Low kidney function     Osteoarthritis     Thyroid disease          SURGICAL HISTORY       Past Surgical History: Procedure Laterality Date    COLECTOMY  2003    COLONOSCOPY  5/11    diverticulosis    COLONOSCOPY  5/6/2016    Colon Ulcer    COLONOSCOPY N/A 6/21/2020    COLONOSCOPY DIAGNOSTIC performed by Alexus Simspon MD at [address]  2007    3 VESSEL    COSMETIC SURGERY      removed skin from stomach    FOOT SURGERY Bilateral     toenails removed    GASTRIC BYPASS SURGERY  2001    HERNIA REPAIR  9960    umbilical    NASAL SEPTUM SURGERY  1995    SHOULDER ARTHROSCOPY Right 9/8/15    rtc repair    THROAT SURGERY  1995    vocal cord     UPPER GASTROINTESTINAL ENDOSCOPY N/A 6/22/2020    EGD BIOPSY performed by Alexus Simpson MD at [address]       Previous Medications    ALBUTEROL (PROVENTIL HFA;VENTOLIN HFA) 108 (90 BASE) MCG/ACT INHALER    Inhale 2 puffs into the lungs every 6 hours as needed. ALBUTEROL (PROVENTIL) (2.5 MG/3ML) 0.083% NEBULIZER SOLUTION    Take 2.5 mg by nebulization every 6 hours as needed for Wheezing    ASCORBIC ACID (VITAMIN C) 500 MG TABLET    Take 500 mg by mouth daily    ASPIRIN EC 81 MG EC TABLET    Take 1 tablet by mouth daily    BACLOFEN PO    Take by mouth    BIOTIN 5000 MCG TABS    Take by mouth    CHOLECALCIFEROL (VITAMIN D-3 PO)    Take by mouth    COENZYME Q10 (COQ10 PO)    Take by mouth    CYANOCOBALAMIN (VITAMIN B-12 CR PO)    Take by mouth daily    IRON CR PO    Take by mouth daily    LEVOTHYROXINE (SYNTHROID) 100 MCG TABLET    Take 50 mcg by mouth daily. LOVASTATIN (MEVACOR) 20 MG TABLET    Take 20 mg by mouth nightly    METOPROLOL (LOPRESSOR) 50 MG TABLET    Take 50 mg by mouth 2 times daily. MONTELUKAST (SINGULAIR) 10 MG TABLET    Take 10 mg by mouth nightly. MULTIPLE VITAMINS-MINERALS (THERAPEUTIC MULTIVITAMIN-MINERALS) TABLET    Take 1 tablet by mouth daily    NITROGLYCERIN (NITROSTAT) 0.4 MG SL TABLET    Place 0.4 mg under the tongue every 5 minutes as needed.       PANTOPRAZOLE Source Pulse Resp SpO2 Height Weight   (!) 68/55 98.2 °F (36.8 °C) Oral 95 16 96 % 5' 8\" (1.727 m) 260 lb (117.9 kg)       Physical Exam    General appearance:  Cooperative. No acute distress. Skin:  Warm. Dry. Eye:  Extraocular movements intact. Ears, nose, mouth and throat:  Oral mucosa moist,  Neck:  Trachea midline. Heart:  Regular rate and rhythm  Perfusion:  intact  Respiratory:  Lungs clear to auscultation bilaterally. Respirations nonlabored. Abdominal:   Non distended. Nontender  Rectal: Small amount of thin bright red blood in the rectal vault  Neurological:  Alert and oriented x 3.   Moves all extremities spontaneously  Musculoskeletal:   Normal ROM, no deformities          Psychiatric:  Normal mood      DIAGNOSTIC RESULTS       Labs Reviewed   CBC WITH AUTO DIFFERENTIAL - Abnormal; Notable for the following components:       Result Value    RBC 3.26 (*)     Hemoglobin 10.9 (*)     Hematocrit 31.8 (*)     Lymphocytes Absolute 0.9 (*)     All other components within normal limits    Narrative:     Performed at:  [address]   Phone [phone]   COMPREHENSIVE METABOLIC PANEL - Abnormal; Notable for the following components:    Sodium 135 (*)     Potassium 5.4 (*)     Glucose 212 (*)     BUN 31 (*)     CREATININE 1.7 (*)     GFR Non- 39 (*)     GFR  47 (*)     Calcium 7.7 (*)     Total Protein 5.8 (*)     Albumin 2.8 (*)     Albumin/Globulin Ratio 0.9 (*)     All other components within normal limits    Narrative:     Performed at:  [place] [address] [employer] [place]   Phone [phone]   PROTIME-INR - Abnormal; Notable for the following components:    Protime 14.3 (*)     INR 1.25 (*)     All other components within normal limits    Narrative:     Performed at:  [facility]  [address] [place] [address] [place], [place]   Phone [phone]   LACTATE, SEPSIS    Narrative:     Performed at:  [facility]) [address], [place]   Phone [phone]   APTT    Narrative:     Performed at:  [facility]) [address], [place]   Phone [phone]   TYPE AND SCREEN    Narrative:     Performed at:  [facility]) [address], [place]   Phone [phone]   PREPARE RBC (CROSSMATCH)       Interpretation per the Radiologist below, if obtained/available at the time of this note:    No orders to display       All other labs/imaging were within normal range or not returned as of this dictation. EMERGENCY DEPARTMENT COURSE and DIFFERENTIAL DIAGNOSIS/MDM:   Vitals:    Vitals:    08/17/21 0710 08/17/21 0727 08/17/21 0807 08/17/21 0813   BP: (!) 86/55 (!) 93/55 (!) 92/51 (!) 88/57   Pulse: 67 72 71 79   Resp: 20 19 18 18   Temp:    97.5 °F (36.4 °C)   TempSrc:    Oral   SpO2: 95% 95% 95% 95%   Weight:       Height:           The patient presented to the emergency department today complaining of rectal bleeding. Had obvious bright red blood in his rectum on exam today. Was initially quite hypotensive but responded well to IV fluids. Initial hemoglobin of 10, but concern for hemoconcentration given his active bleeding. Will transfuse 1 unit of PRBCs. Has had prior similar episodes and after EGD he was found to have a nonbleeding gastric ulcer and there was question of possible upper GI bleed versus diverticular bleed as no clear source was elucidated after endoscopy and tagged nuclear medicine scan. Regardless, he was given Protonix, 1 unit of PRBCs and will be admitted to the hospital.  Blood pressure has been improving here.   Gastroenterology consulted and the patient will be admitted to the hospital    MDM    CONSULTS     IP CONSULT TO GI  IP CONSULT TO HOSPITALIST    Critical Care:     CRITICAL CARE TIME   Total Critical Care time was 30 minutes, excluding separately reportable procedures. There was a high probability of clinically significant/life threatening deterioration in the patient's condition which required my urgent intervention. This time was spent reviewing the patient chart, interpreting diagnostic/laboratory data, transfusion of pRBCs, speaking with consulting physicians      REASSESSMENT          PROCEDURE     Unless otherwise noted below, none     Procedures      FINAL IMPRESSION      1. Lower GI bleed            DISPOSITION/PLAN   DISPOSITION Decision To Admit 08/17/2021 07:43:08 AM        PATIENT REFERRED TO:  No follow-up provider specified. DISCHARGE MEDICATIONS:  New Prescriptions    No medications on file     Controlled Substances Monitoring:     RX Monitoring 7/31/2015   Attestation The Prescription Monitoring Report for this patient was reviewed today. Periodic Controlled Substance Monitoring No signs of potential drug abuse or diversion identified.        (Please note that portions of this note were completed with a voice recognition program.  Efforts were made to edit the dictations but occasionally words are mis-transcribed.)    Rom Estrada MD (electronically signed)  Attending Emergency Physician            Mansi Ragsdale MD  08/17/21 5072

## 2021-08-17 NOTE — RT PROTOCOL NOTE
RT Inhaler-Nebulizer Bronchodilator Protocol Note    There is a bronchodilator order in the chart from a provider indicating to follow the RT Bronchodilator Protocol and there is an Initiate RT Bronchodilator Protocol order as well (see protocol at bottom of note). The findings from the last RT Protocol Assessment were as follows:  Smoking: >15 Pack years  Surgical Status: No surgery  Xray: Clear  Respiratory Pattern: RR 12-20  Mental Status: Alert and Oriented  Breath Sounds: Clear  Cough: Strong, spontaneous, non-productive  Activity Level: Walking with assistance  Oxygen Requirement: Room Air - 2LNC/28% or home setting  Indication for Bronchodilator Therapy: None  Bronchodilator Assessment Score: 2     Plan: Albuterol HHN Q6H PRN. Re-evaluate as needed. Aerosolized bronchodilator medication orders have been revised according to the RT Bronchodilator Protocol. RT Inhaler-Nebulizer Bronchodilator Protocol:    Respiratory Therapist to perform RT Therapy Protocol Assessment then follow the protocol. No Indications - adjust the frequency to every 6 hours PRN wheezing or bronchospasm, if no treatments needed after 48 hours then discontinue using Per Protocol order mode. If indication present, adjust the RT bronchodilator orders based on the Bronchodilator Assessment Score as follows:    0-6 - enter or revise RT bronchodilator order to Albuterol Inhaler order with frequency of every 2 hours PRN for wheezing or increased work of breathing using Per Protocol order mode. If Albuterol Inhaler not tolerated or not effective, then discontinue the Albuterol Inhaler order and enter Albuterol Nebulizer order with same frequency and PRN reasons. Repeat RT Therapy Protocol Assessment as needed.     7-10 - discontinue any other Inpatient aerosolized bronchodilator medication orders and enter or revise two Albuterol Inhaler orders, one with BID frequency and one with frequency of every 2 hours PRN wheezing or increased work of breathing using Per Protocol order mode. Repeat RT Therapy Protocol Assessment with second treatment then BID and as needed. If Albuterol Inhaler not tolerated or not effective, then discontinue the Albuterol Inhaler orders and enter two Albuterol Nebulizer orders with same frequencies and PRN reasons. 11-13 - discontinue any other Inpatient aerosolized bronchodilator medication orders and enter DuoNeb Nebulizer orders QID frequency and an Albuterol Nebulizer order every 2 hours PRN wheezing or increased work of breathing using Per Protocol order mode. Repeat RT Therapy Protocol Assessment with second treatment then QID and as needed. Greater than 13 - discontinue any other Inpatient bronchodilator aerosolized medication orders and enter DuoNeb Nebulizer order every 4 hours frequency and Albuterol Nebulizer every 2 hours PRN wheezing or increased work of breathing using Per Protocol order mode. Repeat RT Therapy Protocol Assessment with second treatment then every 4 hours and as needed. RT to enter RT Home Evaluation for COPD & MDI Assessment order using Per Protocol order mode.     Electronically signed by Andrey De RCP, RRT, RRT-ACCS on 8/17/2021 at 4:07 PM

## 2021-08-17 NOTE — CONSULTS
Gastroenterology Consult Note    Patient:   Juana Bee   YOB: 1940   Facility:   [facility]   Referring/PCP: Jacques Caballero KRIS  Date:     8/17/2021  Consultant:   Iona Morales MD, MD    Subjective: This [de-identified] y.o. male was admitted 8/17/2021 with a diagnosis of \"GI bleed [K92.2]\" and is seen in consultation regarding \"hematochezia\". Information was obtained from interview of  the patient, examination of the patient, and review of records. I did  update the past medical, surgical, social and / or family history. Hematochezia mild-mod in GI tract for 1 day assoc w hypotensiion    Current status  Present  Diet Order: Diet NPO Exceptions are: Sips of Water with Meds and he is tolerating diet. Recently, he has experienced no abdominal  Pain and he has not required Intravenous narcotic analgesics. The patient has also experienced no constipation, diarrhea, fever, melena, nausea and vomiting      Prior to Admission medications    Medication Sig Start Date End Date Taking?  Authorizing Provider   aspirin EC 81 MG EC tablet Take 1 tablet by mouth daily 6/23/20  Yes Jasson Ambrosio MD   pantoprazole (PROTONIX) 40 MG tablet Take 1 tablet by mouth every morning (before breakfast) 6/23/20  Yes Jasson Ambrosio MD   Cholecalciferol (VITAMIN D-3 PO) Take by mouth   Yes Historical Provider, MD   Coenzyme Q10 (COQ10 PO) Take by mouth   Yes Historical Provider, MD   Biotin 5000 MCG TABS Take by mouth   Yes Historical Provider, MD   lovastatin (MEVACOR) 20 MG tablet Take 20 mg by mouth nightly   Yes Historical Provider, MD   BACLOFEN PO Take by mouth   Yes Historical Provider, MD   IRON CR PO Take by mouth daily   Yes Historical Provider, MD   Multiple Vitamins-Minerals (THERAPEUTIC MULTIVITAMIN-MINERALS) tablet Take 1 tablet by mouth daily   Yes Historical Provider, MD   Cyanocobalamin (VITAMIN B-12 CR PO) Take by mouth daily   Yes Historical Provider, MD   Ascorbic Acid (VITAMIN C) 500 MG tablet Take 500 mg by mouth daily   Yes Historical Provider, MD   albuterol (PROVENTIL) (2.5 MG/3ML) 0.083% nebulizer solution Take 2.5 mg by nebulization every 6 hours as needed for Wheezing   Yes Historical Provider, MD   nitroGLYCERIN (NITROSTAT) 0.4 MG SL tablet Place 0.4 mg under the tongue every 5 minutes as needed. Yes Historical Provider, MD   metoprolol (LOPRESSOR) 50 MG tablet Take 50 mg by mouth 2 times daily. Yes Historical Provider, MD   levothyroxine (SYNTHROID) 100 MCG tablet Take 50 mcg by mouth daily. Yes Historical Provider, MD   montelukast (SINGULAIR) 10 MG tablet Take 10 mg by mouth nightly. Yes Historical Provider, MD   albuterol (PROVENTIL HFA;VENTOLIN HFA) 108 (90 BASE) MCG/ACT inhaler Inhale 2 puffs into the lungs every 6 hours as needed. Yes Historical Provider, MD      Scheduled Medications:    aspirin EC  81 mg Oral Daily    [START ON 8/18/2021] levothyroxine  50 mcg Oral Daily    [START ON 8/18/2021] atorvastatin  10 mg Oral Daily    montelukast  10 mg Oral Nightly    sodium chloride flush  5-40 mL Intravenous 2 times per day    pantoprazole  40 mg Intravenous BID     Infusions:    sodium chloride      sodium chloride 125 mL/hr at 08/17/21 1445    sodium chloride       PRN Medications: sodium chloride, albuterol, sodium chloride flush, sodium chloride, ondansetron **OR** ondansetron, polyethylene glycol, acetaminophen **OR** acetaminophen  Allergies:    Allergies   Allergen Reactions    Seasonal      Pollen    Clindamycin Hcl Rash       Past Medical History:   Diagnosis Date    Arthritis     Asthma     COPD    CAD (coronary artery disease)     S/P CABG    Gastric bypass status for obesity     Hyperlipidemia     Hypertension     Low kidney function     Osteoarthritis     Thyroid disease      Past Surgical History:   Procedure Laterality Date    COLECTOMY  2003    COLONOSCOPY  5/11    diverticulosis    COLONOSCOPY  5/6/2016 Colon Ulcer    COLONOSCOPY N/A 2020    COLONOSCOPY DIAGNOSTIC performed by Forest Aguilar MD at [address] [facility] [place]      3 VESSEL    COSMETIC SURGERY      removed skin from stomach    FOOT SURGERY Bilateral     toenails removed    GASTRIC BYPASS SURGERY      HERNIA REPAIR  9550    umbilical    NASAL SEPTUM SURGERY      SHOULDER ARTHROSCOPY Right 9/8/15    rtc repair    THROAT SURGERY      vocal cord     UPPER GASTROINTESTINAL ENDOSCOPY N/A 2020    EGD BIOPSY performed by Forest Aguilar MD at [facility]   Social History     Tobacco Use    Smoking status: Current Every Day Smoker     Packs/day: 1.00     Years: 20.00     Pack years: 20.00     Types: Cigarettes    Smokeless tobacco: Never Used    Tobacco comment: plans on quitting 2016 when he gets a total knee   Substance Use Topics    Alcohol use: No     Comment: pt poor historian     Family:   Family History   Problem Relation Age of Onset    Cancer Maternal Grandmother        ROS: Pertinent items are noted in HPI.     Objective:   Vital Signs:  Temp (24hrs), Av.8 °F (36.6 °C), Min:97.5 °F (36.4 °C), Max:98.2 °F (26.5 °C)     Systolic (79LQV), RACHEL:368 , Min:68 , JFO:863      Diastolic (85WKW), EZN:60, Min:51, Max:70     Pulse  Av.1  Min: 60  Max: 95  BP (!) 120/53   Pulse 84   Temp 97.7 °F (36.5 °C)   Resp 17   Ht 5' 8\" (1.727 m)   Wt 260 lb (117.9 kg)   SpO2 95%   BMI 39.53 kg/m²      Physical Exam:   BP (!) 153/74   Pulse 102   Temp 98.2 °F (36.8 °C) (Axillary)   Resp 18   Ht 5' 8\" (1.727 m)   Wt 247 lb 9.6 oz (112.3 kg)   SpO2 93%   BMI 37.65 kg/m²   General appearance: alert, appears stated age and cooperative  Lungs: clear to auscultation bilaterally  Chest wall: no tenderness  Heart: regular rate and rhythm, S1, S2 normal, no murmur, click, rub or gallop  Abdomen: soft, non-tender; bowel sounds normal; no masses,  no organomegaly  Extremities: extremities normal, atraumatic, no cyanosis or edema  Skin: Skin color, texture, turgor normal. No rashes or lesions  Neurologic: Grossly normal    Lab and Imaging Review   Recent Labs     08/17/21  0556 08/17/21  1130   WBC 6.7  --    HGB 10.9* 10.8*   MCV 97.7  --      --    INR 1.25*  --    *  --    K 5.4*  --      --    CO2 21  --    BUN 31*  --    CREATININE 1.7*  --    GLUCOSE 212*  --    CALCIUM 7.7*  --    PROT 5.8*  --    LABALBU 2.8*  --    AST 28  --    ALT 23  --    ALKPHOS 90  --    BILITOT 0.5  --        Assessment:     Patient Active Problem List    Diagnosis Date Noted    Obesity 08/17/2021    Hyperkalemia 08/17/2021    ARF (acute renal failure) ([facility] [address] 08/17/2021    Anemia 08/17/2021    HTN (hypertension) 08/17/2021    CAD (coronary artery disease) 08/17/2021    Hyperlipidemia 08/17/2021    Hypothyroid 08/17/2021    GI bleed 06/20/2020    Primary osteoarthritis of left knee 09/12/2017    Rotator cuff arthropathy 09/15/2016    Colon ulcer 05/06/2016    Complete rotator cuff tear 08/11/2015    Rotator cuff strain 07/31/2015    Shoulder pain 07/31/2015    Primary localized osteoarthrosis, lower leg 02/13/2015     [de-identified] YO M with h/o HTN, HLD, CAD s/p CABG and s/p gastric bypass presents w recurrent hematochezia, similar to 6/2020 during which time he had w neg. Colonoscopy, and an 8mm white based  on EGD. He is on ASA and Protonix at home. H/H 10/31, but was transfused in the ER due to hypotension. Plan:   1. F/U H/H  2. PPI  3. Will consider starting w an EGD prior to D/C and avoiding colonoscopy unless necessary  4.  Will follow

## 2021-08-18 LAB
ALBUMIN SERPL-MCNC: 2.8 G/DL (ref 3.4–5)
ANION GAP SERPL CALCULATED.3IONS-SCNC: 10 MMOL/L (ref 3–16)
BUN BLDV-MCNC: 30 MG/DL (ref 7–20)
CALCIUM SERPL-MCNC: 7.7 MG/DL (ref 8.3–10.6)
CHLORIDE BLD-SCNC: 109 MMOL/L (ref 99–110)
CO2: 20 MMOL/L (ref 21–32)
CREAT SERPL-MCNC: 1.4 MG/DL (ref 0.8–1.3)
GFR AFRICAN AMERICAN: 59
GFR NON-AFRICAN AMERICAN: 49
GLUCOSE BLD-MCNC: 140 MG/DL (ref 70–99)
HCT VFR BLD CALC: 27.7 % (ref 40.5–52.5)
HEMOGLOBIN: 9.3 G/DL (ref 13.5–17.5)
MCH RBC QN AUTO: 32.1 PG (ref 26–34)
MCHC RBC AUTO-ENTMCNC: 33.6 G/DL (ref 31–36)
MCV RBC AUTO: 95.5 FL (ref 80–100)
PDW BLD-RTO: 15.2 % (ref 12.4–15.4)
PHOSPHORUS: 3.1 MG/DL (ref 2.5–4.9)
PLATELET # BLD: 143 K/UL (ref 135–450)
PMV BLD AUTO: 8.7 FL (ref 5–10.5)
POTASSIUM SERPL-SCNC: 5 MMOL/L (ref 3.5–5.1)
RBC # BLD: 2.9 M/UL (ref 4.2–5.9)
SARS-COV-2, NAAT: NOT DETECTED
SODIUM BLD-SCNC: 139 MMOL/L (ref 136–145)
WBC # BLD: 4.3 K/UL (ref 4–11)

## 2021-08-18 PROCEDURE — 2580000003 HC RX 258: Performed by: INTERNAL MEDICINE

## 2021-08-18 PROCEDURE — 6370000000 HC RX 637 (ALT 250 FOR IP): Performed by: NURSE PRACTITIONER

## 2021-08-18 PROCEDURE — 6370000000 HC RX 637 (ALT 250 FOR IP): Performed by: INTERNAL MEDICINE

## 2021-08-18 PROCEDURE — 6360000002 HC RX W HCPCS: Performed by: INTERNAL MEDICINE

## 2021-08-18 PROCEDURE — 85027 COMPLETE CBC AUTOMATED: CPT

## 2021-08-18 PROCEDURE — C9113 INJ PANTOPRAZOLE SODIUM, VIA: HCPCS | Performed by: INTERNAL MEDICINE

## 2021-08-18 PROCEDURE — 1200000000 HC SEMI PRIVATE

## 2021-08-18 PROCEDURE — 36415 COLL VENOUS BLD VENIPUNCTURE: CPT

## 2021-08-18 PROCEDURE — 87635 SARS-COV-2 COVID-19 AMP PRB: CPT

## 2021-08-18 PROCEDURE — 80069 RENAL FUNCTION PANEL: CPT

## 2021-08-18 RX ORDER — LOSARTAN POTASSIUM 25 MG/1
25 TABLET ORAL DAILY
COMMUNITY

## 2021-08-18 RX ORDER — LEVOTHYROXINE SODIUM 88 UG/1
88 TABLET ORAL DAILY
Status: DISCONTINUED | OUTPATIENT
Start: 2021-08-19 | End: 2021-08-20 | Stop reason: HOSPADM

## 2021-08-18 RX ORDER — CYCLOBENZAPRINE HCL 10 MG
10 TABLET ORAL 3 TIMES DAILY PRN
Status: DISCONTINUED | OUTPATIENT
Start: 2021-08-18 | End: 2021-08-20 | Stop reason: HOSPADM

## 2021-08-18 RX ADMIN — SODIUM CHLORIDE: 9 INJECTION, SOLUTION INTRAVENOUS at 03:36

## 2021-08-18 RX ADMIN — SODIUM CHLORIDE, PRESERVATIVE FREE 10 ML: 5 INJECTION INTRAVENOUS at 21:13

## 2021-08-18 RX ADMIN — ASPIRIN 81 MG: 81 TABLET, COATED ORAL at 09:21

## 2021-08-18 RX ADMIN — CYCLOBENZAPRINE 10 MG: 10 TABLET, FILM COATED ORAL at 18:51

## 2021-08-18 RX ADMIN — Medication 1 LOZENGE: at 21:13

## 2021-08-18 RX ADMIN — PANTOPRAZOLE SODIUM 40 MG: 40 INJECTION, POWDER, FOR SOLUTION INTRAVENOUS at 09:21

## 2021-08-18 RX ADMIN — PANTOPRAZOLE SODIUM 40 MG: 40 INJECTION, POWDER, FOR SOLUTION INTRAVENOUS at 21:12

## 2021-08-18 RX ADMIN — MONTELUKAST SODIUM 10 MG: 10 TABLET ORAL at 21:12

## 2021-08-18 RX ADMIN — SODIUM CHLORIDE, PRESERVATIVE FREE 10 ML: 5 INJECTION INTRAVENOUS at 09:21

## 2021-08-18 RX ADMIN — ATORVASTATIN CALCIUM 10 MG: 10 TABLET, FILM COATED ORAL at 09:21

## 2021-08-18 ASSESSMENT — PAIN SCALES - GENERAL: PAINLEVEL_OUTOF10: 0

## 2021-08-18 NOTE — PROGRESS NOTES
Progress Note  Date:2021       Room:0109/0109-01  Patient Fatemeh Kulkarni     YOB: 1940     Age:81 y.o. Subjective    Subjective:  Symptoms:  Stable. Pain:  He reports no pain. Review of Systems  Objective         Vitals Last 24 Hours:  TEMPERATURE:  Temp  Av.2 °F (36.8 °C)  Min: 97.9 °F (36.6 °C)  Max: 98.5 °F (36.9 °C)  RESPIRATIONS RANGE: Resp  Av  Min: 10  Max: 25  PULSE OXIMETRY RANGE: SpO2  Av.4 %  Min: 92 %  Max: 95 %  PULSE RANGE: Pulse  Av.2  Min: 81  Max: 102  BLOOD PRESSURE RANGE: Systolic (60OQY), YSI:944 , Min:116 , EWE:183   ; Diastolic (55BGF), IPU:34, Min:53, Max:86    I/O (24Hr): Intake/Output Summary (Last 24 hours) at 2021 1518  Last data filed at 2021 1244  Gross per 24 hour   Intake 240 ml   Output --   Net 240 ml     Objective:  General Appearance: In no acute distress and not in pain. Vital signs: (most recent): Blood pressure 130/86, pulse 89, temperature 98.5 °F (36.9 °C), temperature source Oral, resp. rate 16, height 5' 8\" (1.727 m), weight 247 lb 9.6 oz (112.3 kg), SpO2 94 %. Heart: Normal rate. Regular rhythm. S1 normal and S2 normal.    Abdomen: Abdomen is soft. Bowel sounds are normal.   There is no abdominal tenderness. Labs/Imaging/Diagnostics    Labs:  CBC:  Recent Labs     21  0556 21  1130 21  1721 21  2041 21  0809   WBC 6.7  --   --   --  4.3   RBC 3.26*  --   --   --  2.90*   HGB 10.9*   < > 10.5* 10.0* 9.3*   HCT 31.8*   < > 31.1* 29.8* 27.7*   MCV 97.7  --   --   --  95.5   RDW 14.6  --   --   --  15.2     --   --   --  143    < > = values in this interval not displayed.      CHEMISTRIES:  Recent Labs     21  0556 21  0809   * 139   K 5.4* 5.0    109   CO2 21 20*   BUN 31* 30*   CREATININE 1.7* 1.4*   GLUCOSE 212* 140*   PHOS  --  3.1     PT/INR:  Recent Labs     21  0556   PROTIME 14.3*   INR 1.25*     APTT:  Recent Labs 08/17/21  0556   APTT 32.0     LIVER PROFILE:  Recent Labs     08/17/21  0556   AST 28   ALT 23   BILITOT 0.5   ALKPHOS 90       Imaging Last 24 Hours:  No results found. Assessment//Plan           Hospital Problems         Last Modified POA    * (Principal) GI bleed 8/17/2021 Yes    Obesity 8/17/2021 Yes    Hyperkalemia 8/17/2021 Yes    ARF (acute renal failure) ([address] 8/17/2021 Yes    Anemia 8/17/2021 Yes    HTN (hypertension) 8/17/2021 Yes    CAD (coronary artery disease) 8/17/2021 Yes    Hyperlipidemia 8/17/2021 Yes    Hypothyroid 8/17/2021 Yes        Assessment & Plan  [de-identified] YO M with h/o HTN, HLD, CAD s/p CABG and s/p gastric bypass presents w recurrent hematochezia, similar to 6/2020 during which time he had w neg. Colonoscopy, and an 8mm white based  on EGD. He is on ASA and Protonix at home. H/H 10/31, but was transfused in the ER due to hypotension.     Plan:   1. F/U H/H  2. PPI  3. Will schedule EGD in am, avoiding colonoscopy unless necessary  4.  Will follow    Halima Vera MD       (O) [phone]        Electronically signed by Halima Vera MD on 8/18/21 at 3:18 PM EDT

## 2021-08-18 NOTE — PLAN OF CARE
Nutrition Problem #1: Inadequate energy intake  Intervention: Food and/or Nutrient Delivery: Continue Current Diet  Nutritional Goals: Patient will eat 50% or greater of meals without GI distress.

## 2021-08-18 NOTE — PROGRESS NOTES
Comprehensive Nutrition Assessment    Type and Reason for Visit:  Initial, Positive Nutrition Screen (non healing wound, chewing difficulty-has dentures)    Nutrition Recommendations/Plan:   1. Full liquids advance as tolerated per GI  2. Will monitor nutritional adequacy, nutrition-related labs, weights, BMs, and clinical progress     Nutrition Assessment:  Patient admitted with GI bleed. Diet progressed up to full liquids. GI following, discussion of EGD this admission. History of gastric bypass 20 years ago. Patient reported tolerating full liquids without nausea but currently having frequent bloody stools with GI bleed. Patient stated not wanting to eat any dinner to make sure scope is nice and clear tomorrow. Patient refused need for nutrition supplements at this time. Will continue to monitor. Malnutrition Assessment:  Malnutrition Status:  No malnutrition      Estimated Daily Nutrient Needs:  Energy (kcal):  6876-5180; Weight Used for Energy Requirements:  Ideal     Protein (g):  ; Weight Used for Protein Requirements:  Ideal (1.3-1.5)        Fluid (ml/day):   ; Method Used for Fluid Requirements:         Nutrition Related Findings:  K bumped up to 5.4 with drop in Na and elevated BUN/Creat-acute renal failure noted this admission      Wounds:   (old surgican wound, redness)       Current Nutrition Therapies:    ADULT DIET;  Full Liquid    Anthropometric Measures:  · Height: 5' 8\" (172.7 cm)  · Current Body Weight: 247 lb 9 oz (112.3 kg)   · Ideal Body Weight: 154 lbs; % Ideal Body Weight     · BMI: 37.7    · BMI Categories: Obese Class 2 (BMI 35.0 -39.9)       Nutrition Diagnosis:   · Inadequate energy intake related to increase demand for energy/nutrients as evidenced by  (current condition, advanced age)    Nutrition Interventions:   Food and/or Nutrient Delivery:  Continue Current Diet  Nutrition Education/Counseling:      Coordination of Nutrition Care:  Continue to monitor while inpatient    Goals:  Patient will eat 50% or greater of meals without GI distress. Nutrition Monitoring and Evaluation:   Behavioral-Environmental Outcomes:      Food/Nutrient Intake Outcomes:  Diet Advancement/Tolerance, Food and Nutrient Intake  Physical Signs/Symptoms Outcomes:  Nutrition Focused Physical Findings, GI Status     Discharge Planning:     Too soon to determine     Electronically signed by Bianka Echols, [address] [place] [address],  on 8/18/21 at 2:30 PM EDT    Contact: Office: [phone]; [address] [place] [address] Road: [phone]

## 2021-08-18 NOTE — PROGRESS NOTES
4 Eyes Skin Assessment     The patient is being assess for   Admission    I agree that 2 RN's have performed a thorough Head to Toe Skin Assessment on the patient. ALL assessment sites listed below have been assessed. Areas assessed for pressure by both nurses:   [x]   Head, Face, and Ears   [x]   Shoulders, Back, and Chest, Abdomen  [x]   Arms, Elbows, and Hands   [x]   Coccyx, Sacrum, and Ischium  [x]   Legs, Feet, and Heels        Skin Assessed Under all Medical Devices by both nurses:  N/A              All Mepilex Borders were peeled back and area peeked at by both nurses:  No: N/A  Please list where Mepilex Borders are located:  N/A             **SHARE this note so that the co-signing nurse is able to place an eSignature**    Co-signer eSignature: Electronically signed by Jovi Cooley RN on 8/17/21 at 9:40 PM EDT    Does the Patient have Skin Breakdown related to pressure?   No         Cuco Prevention initiated:  No   Wound Care Orders initiated:  Yes      [address]  nurse consulted for Pressure Injury (Stage 3,4, Unstageable, DTI, NWPT, Complex wounds)and New or Established Ostomies:  NA      Primary Nurse eSignature: Electronically signed by Adán Westfall RN on 8/17/21 at 9:00 PM EDT

## 2021-08-18 NOTE — PROGRESS NOTES
Hospitalist Progress Note      PCP: Lucrecia PONCE    Date of Admission: 8/17/2021    Chief Complaint: Rectal Bleeding    Subjective: no new c/o. Medications:  Reviewed    Infusion Medications    sodium chloride      sodium chloride       Scheduled Medications    aspirin EC  81 mg Oral Daily    levothyroxine  50 mcg Oral Daily    atorvastatin  10 mg Oral Daily    montelukast  10 mg Oral Nightly    sodium chloride flush  5-40 mL Intravenous 2 times per day    pantoprazole  40 mg Intravenous BID     PRN Meds: sodium chloride, albuterol, sodium chloride flush, sodium chloride, ondansetron **OR** ondansetron, polyethylene glycol, acetaminophen **OR** acetaminophen      Intake/Output Summary (Last 24 hours) at 8/18/2021 1153  Last data filed at 8/18/2021 4581  Gross per 24 hour   Intake 120 ml   Output 200 ml   Net -80 ml       Physical Exam Performed:    /72   Pulse 95   Temp 97.9 °F (36.6 °C) (Oral)   Resp 18   Ht 5' 8\" (1.727 m)   Wt 247 lb 9.6 oz (112.3 kg)   SpO2 92%   BMI 37.65 kg/m²     General appearance: No apparent distress, appears stated age and cooperative. HEENT: Pupils equal, round, and reactive to light. Conjunctivae/corneas clear. Neck: Supple, with full range of motion. No jugular venous distention. Trachea midline. Respiratory:  Normal respiratory effort. Clear to auscultation, bilaterally without Rales/Wheezes/Rhonchi. Cardiovascular: Regular rate and rhythm with normal S1/S2 without murmurs, rubs or gallops. Abdomen: Soft, non-tender, non-distended with normal bowel sounds. Musculoskeletal: No clubbing, cyanosis or edema bilaterally. Full range of motion without deformity. Skin: Skin color, texture, turgor normal.  No rashes or lesions. Neurologic:  Neurovascularly intact without any focal sensory/motor deficits.  Cranial nerves: II-XII intact, grossly non-focal.  Psychiatric: Alert and oriented, thought content appropriate, normal insight  Capillary Refill: Brisk,< 3 seconds   Peripheral Pulses: +2 palpable, equal bilaterally       Labs:   Recent Labs     08/17/21  0556 08/17/21  1130 08/17/21  1721 08/17/21  2041 08/18/21  0809   WBC 6.7  --   --   --  4.3   HGB 10.9*   < > 10.5* 10.0* 9.3*   HCT 31.8*   < > 31.1* 29.8* 27.7*     --   --   --  143    < > = values in this interval not displayed. Recent Labs     08/17/21  0556 08/18/21  0809   * 139   K 5.4* 5.0    109   CO2 21 20*   BUN 31* 30*   CREATININE 1.7* 1.4*   CALCIUM 7.7* 7.7*   PHOS  --  3.1     Recent Labs     08/17/21  0556   AST 28   ALT 23   BILITOT 0.5   ALKPHOS 90     Recent Labs     08/17/21  0556   INR 1.25*     No results for input(s): CKTOTAL, TROPONINI in the last 72 hours. Urinalysis:      Lab Results   Component Value Date    NITRU NEGATIVE 01/31/2012    WBCUA Rare 01/31/2012    BACTERIA 2+ 01/31/2012    RBCUA 3-5 01/31/2012    BLOODU TRACE-INTACT 01/31/2012    SPECGRAV 1.025 01/31/2012    GLUCOSEU NEGATIVE 01/31/2012       Consults:    IP CONSULT TO GI  IP CONSULT TO HOSPITALIST      Assessment/Plan:    Active Hospital Problems    Diagnosis     Obesity [E66.9]     Hyperkalemia [E87.5]     ARF (acute renal failure) (HCC) [N17.9]     Anemia [D64.9]     HTN (hypertension) [I10]     CAD (coronary artery disease) [I25.10]     Hyperlipidemia [E78.5]     Hypothyroid [E03.9]     GI bleed [K92.2]        GI Bleed - likely lower GI Bleed, though had hx of PUD. On IV Protonix. GI consulted from ED and appreciated in advance. NPO on IVF. Initially hypotensive but improving.      Anemia - 2nd to acute GI blood loss w/out evidence of ongoing hemodynamically active bleeding/hemolysis. Asymptomatic w/out indication for transfusion. Follow serial labs. Reviewed and documented as above.     HTN/CAD - w/ known CAD but no evidence of active signs/sxs of ischemia/failure.  Hypotensive on arrival w/ home meds held w/ vitals reviewed and documented as above.     HyperLipidemia - controlled on home Statin. Continue, w/ f/u and med adjustment w/ PCP     ARF - w/ elevated BUN/Cr ratio c/w pre-renal azotemia. Given IVF hydration and follow serial labs. Reviewed and documented as above.     HyperKalemia - etiology clinically unable to determine. Follow serial labs. Reviewed and documented as above.     HypoThyroid - clinically euthyroid on oral replacement therapy. Continue, w/ outpt monitoring as previously arranged.      Obesity -  With Body mass index is 39.53 kg/m². Complicating assessment and treatment. Placing patient at risk for multiple co-morbidities as well as early death and contributing to the patient's presentation. Counseled on weight loss.        DVT Prophylaxis: IPC      Recent Labs     08/17/21  0556 08/18/21  0809    143     Diet: ADULT DIET;  Full Liquid  Code Status: Full Code      PT/OT Eval Status: not yet ordered.      Dispo - Likely Thurs/Friday 19/20 August pending clinical course and subspecialty Emi Villeda MD

## 2021-08-18 NOTE — PLAN OF CARE
Problem: Falls - Risk of:  Goal: Will remain free from falls  Description: Will remain free from falls  Outcome: Ongoing  Goal: Absence of physical injury  Description: Absence of physical injury  Outcome: Ongoing     Problem: Nutrition  Goal: Optimal nutrition therapy  8/18/2021 1554 by Twin Webb RN  Outcome: Ongoing  8/18/2021 1434 by Tali Padron RD, TRACY  Outcome: Ongoing     Problem: Nutrition  Goal: Optimal nutrition therapy  8/18/2021 1554 by Twin Webb RN  Outcome: Ongoing  8/18/2021 1434 by Tali Padron RD, TRACY  Outcome: Ongoing

## 2021-08-19 ENCOUNTER — ANESTHESIA (OUTPATIENT)
Dept: ENDOSCOPY | Age: 81
DRG: 378 | End: 2021-08-19
Payer: MEDICARE

## 2021-08-19 ENCOUNTER — ANESTHESIA EVENT (OUTPATIENT)
Dept: ENDOSCOPY | Age: 81
DRG: 378 | End: 2021-08-19
Payer: MEDICARE

## 2021-08-19 VITALS — DIASTOLIC BLOOD PRESSURE: 76 MMHG | OXYGEN SATURATION: 70 % | SYSTOLIC BLOOD PRESSURE: 151 MMHG

## 2021-08-19 LAB
ALBUMIN SERPL-MCNC: 2.5 G/DL (ref 3.4–5)
ANION GAP SERPL CALCULATED.3IONS-SCNC: 7 MMOL/L (ref 3–16)
BUN BLDV-MCNC: 23 MG/DL (ref 7–20)
CALCIUM SERPL-MCNC: 7.7 MG/DL (ref 8.3–10.6)
CHLORIDE BLD-SCNC: 108 MMOL/L (ref 99–110)
CO2: 21 MMOL/L (ref 21–32)
CREAT SERPL-MCNC: 1.5 MG/DL (ref 0.8–1.3)
GFR AFRICAN AMERICAN: 54
GFR NON-AFRICAN AMERICAN: 45
GLUCOSE BLD-MCNC: 144 MG/DL (ref 70–99)
HCT VFR BLD CALC: 26.6 % (ref 40.5–52.5)
HEMOGLOBIN: 9 G/DL (ref 13.5–17.5)
MCH RBC QN AUTO: 32.6 PG (ref 26–34)
MCHC RBC AUTO-ENTMCNC: 33.8 G/DL (ref 31–36)
MCV RBC AUTO: 96.3 FL (ref 80–100)
PDW BLD-RTO: 14.8 % (ref 12.4–15.4)
PHOSPHORUS: 3.4 MG/DL (ref 2.5–4.9)
PLATELET # BLD: 157 K/UL (ref 135–450)
PMV BLD AUTO: 7.8 FL (ref 5–10.5)
POTASSIUM SERPL-SCNC: 4.4 MMOL/L (ref 3.5–5.1)
RBC # BLD: 2.76 M/UL (ref 4.2–5.9)
SODIUM BLD-SCNC: 136 MMOL/L (ref 136–145)
WBC # BLD: 3.7 K/UL (ref 4–11)

## 2021-08-19 PROCEDURE — 0DJ08ZZ INSPECTION OF UPPER INTESTINAL TRACT, VIA NATURAL OR ARTIFICIAL OPENING ENDOSCOPIC: ICD-10-PCS | Performed by: INTERNAL MEDICINE

## 2021-08-19 PROCEDURE — 6370000000 HC RX 637 (ALT 250 FOR IP): Performed by: INTERNAL MEDICINE

## 2021-08-19 PROCEDURE — 2580000003 HC RX 258: Performed by: INTERNAL MEDICINE

## 2021-08-19 PROCEDURE — 2709999900 HC NON-CHARGEABLE SUPPLY: Performed by: INTERNAL MEDICINE

## 2021-08-19 PROCEDURE — 2500000003 HC RX 250 WO HCPCS: Performed by: NURSE ANESTHETIST, CERTIFIED REGISTERED

## 2021-08-19 PROCEDURE — 36415 COLL VENOUS BLD VENIPUNCTURE: CPT

## 2021-08-19 PROCEDURE — 6360000002 HC RX W HCPCS: Performed by: INTERNAL MEDICINE

## 2021-08-19 PROCEDURE — 1200000000 HC SEMI PRIVATE

## 2021-08-19 PROCEDURE — 85027 COMPLETE CBC AUTOMATED: CPT

## 2021-08-19 PROCEDURE — 3609017100 HC EGD: Performed by: INTERNAL MEDICINE

## 2021-08-19 PROCEDURE — 80069 RENAL FUNCTION PANEL: CPT

## 2021-08-19 PROCEDURE — C9113 INJ PANTOPRAZOLE SODIUM, VIA: HCPCS | Performed by: INTERNAL MEDICINE

## 2021-08-19 PROCEDURE — 6360000002 HC RX W HCPCS: Performed by: NURSE ANESTHETIST, CERTIFIED REGISTERED

## 2021-08-19 PROCEDURE — 7100000010 HC PHASE II RECOVERY - FIRST 15 MIN: Performed by: INTERNAL MEDICINE

## 2021-08-19 PROCEDURE — 7100000011 HC PHASE II RECOVERY - ADDTL 15 MIN: Performed by: INTERNAL MEDICINE

## 2021-08-19 PROCEDURE — 3700000000 HC ANESTHESIA ATTENDED CARE: Performed by: INTERNAL MEDICINE

## 2021-08-19 RX ORDER — PROPOFOL 10 MG/ML
INJECTION, EMULSION INTRAVENOUS PRN
Status: DISCONTINUED | OUTPATIENT
Start: 2021-08-19 | End: 2021-08-19 | Stop reason: SDUPTHER

## 2021-08-19 RX ORDER — SODIUM CHLORIDE, SODIUM LACTATE, POTASSIUM CHLORIDE, CALCIUM CHLORIDE 600; 310; 30; 20 MG/100ML; MG/100ML; MG/100ML; MG/100ML
INJECTION, SOLUTION INTRAVENOUS CONTINUOUS
Status: DISCONTINUED | OUTPATIENT
Start: 2021-08-19 | End: 2021-08-19

## 2021-08-19 RX ORDER — LIDOCAINE HYDROCHLORIDE 20 MG/ML
INJECTION, SOLUTION EPIDURAL; INFILTRATION; INTRACAUDAL; PERINEURAL PRN
Status: DISCONTINUED | OUTPATIENT
Start: 2021-08-19 | End: 2021-08-19 | Stop reason: SDUPTHER

## 2021-08-19 RX ADMIN — SODIUM CHLORIDE, POTASSIUM CHLORIDE, SODIUM LACTATE AND CALCIUM CHLORIDE: 600; 310; 30; 20 INJECTION, SOLUTION INTRAVENOUS at 11:18

## 2021-08-19 RX ADMIN — ASPIRIN 81 MG: 81 TABLET, COATED ORAL at 09:10

## 2021-08-19 RX ADMIN — PANTOPRAZOLE SODIUM 40 MG: 40 INJECTION, POWDER, FOR SOLUTION INTRAVENOUS at 09:10

## 2021-08-19 RX ADMIN — LEVOTHYROXINE SODIUM 88 MCG: 0.09 TABLET ORAL at 09:10

## 2021-08-19 RX ADMIN — LIDOCAINE HYDROCHLORIDE 50 MG: 20 INJECTION, SOLUTION EPIDURAL; INFILTRATION; INTRACAUDAL; PERINEURAL at 11:47

## 2021-08-19 RX ADMIN — SODIUM CHLORIDE, PRESERVATIVE FREE 10 ML: 5 INJECTION INTRAVENOUS at 21:30

## 2021-08-19 RX ADMIN — PANTOPRAZOLE SODIUM 40 MG: 40 INJECTION, POWDER, FOR SOLUTION INTRAVENOUS at 21:08

## 2021-08-19 RX ADMIN — SODIUM CHLORIDE, PRESERVATIVE FREE 10 ML: 5 INJECTION INTRAVENOUS at 09:19

## 2021-08-19 RX ADMIN — CYCLOBENZAPRINE 10 MG: 10 TABLET, FILM COATED ORAL at 18:24

## 2021-08-19 RX ADMIN — ATORVASTATIN CALCIUM 10 MG: 10 TABLET, FILM COATED ORAL at 09:10

## 2021-08-19 RX ADMIN — MONTELUKAST SODIUM 10 MG: 10 TABLET ORAL at 21:08

## 2021-08-19 RX ADMIN — PROPOFOL 70 MG: 10 INJECTION, EMULSION INTRAVENOUS at 11:47

## 2021-08-19 ASSESSMENT — PAIN SCALES - GENERAL
PAINLEVEL_OUTOF10: 0

## 2021-08-19 NOTE — ANESTHESIA PRE PROCEDURE
Department of Anesthesiology  Preprocedure Note       Name:  Woodrow Ruiz   Age:  [de-identified] y.o.  :  1940                                          MRN:  [id number]         Date:  2021      Surgeon: Zach Burkett):  Halima Vera MD    Procedure: Procedure(s):  EGD ESOPHAGOGASTRODUODENOSCOPY    Medications prior to admission:   Prior to Admission medications    Medication Sig Start Date End Date Taking? Authorizing Provider   losartan (COZAAR) 25 MG tablet Take 25 mg by mouth daily   Yes Historical Provider, MD   aspirin EC 81 MG EC tablet Take 1 tablet by mouth daily 20  Yes Chencho Fernandez MD   pantoprazole (PROTONIX) 40 MG tablet Take 1 tablet by mouth every morning (before breakfast) 20  Yes Chencho Fernandez MD   Cholecalciferol (VITAMIN D-3 PO) Take by mouth   Yes Historical Provider, MD   Coenzyme Q10 (COQ10 PO) Take by mouth   Yes Historical Provider, MD   Biotin 5000 MCG TABS Take by mouth   Yes Historical Provider, MD   lovastatin (MEVACOR) 20 MG tablet Take 20 mg by mouth nightly   Yes Historical Provider, MD   BACLOFEN PO Take by mouth   Yes Historical Provider, MD   IRON CR PO Take by mouth daily   Yes Historical Provider, MD   Multiple Vitamins-Minerals (THERAPEUTIC MULTIVITAMIN-MINERALS) tablet Take 1 tablet by mouth daily   Yes Historical Provider, MD   Cyanocobalamin (VITAMIN B-12 CR PO) Take by mouth daily   Yes Historical Provider, MD   Ascorbic Acid (VITAMIN C) 500 MG tablet Take 500 mg by mouth daily   Yes Historical Provider, MD   albuterol (PROVENTIL) (2.5 MG/3ML) 0.083% nebulizer solution Take 2.5 mg by nebulization every 6 hours as needed for Wheezing   Yes Historical Provider, MD   nitroGLYCERIN (NITROSTAT) 0.4 MG SL tablet Place 0.4 mg under the tongue every 5 minutes as needed. Yes Historical Provider, MD   metoprolol (LOPRESSOR) 50 MG tablet Take 50 mg by mouth 2 times daily.      Yes Historical Provider, MD   levothyroxine (SYNTHROID) 100 MCG tablet Take 50 mcg by mouth daily. Yes Historical Provider, MD   montelukast (SINGULAIR) 10 MG tablet Take 10 mg by mouth nightly. Yes Historical Provider, MD   albuterol (PROVENTIL HFA;VENTOLIN HFA) 108 (90 BASE) MCG/ACT inhaler Inhale 2 puffs into the lungs every 6 hours as needed.      Yes Historical Provider, MD       Current medications:    Current Facility-Administered Medications   Medication Dose Route Frequency Provider Last Rate Last Admin    lactated ringers infusion   Intravenous Continuous Lizeth MD Douglas 50 mL/hr at 08/19/21 1118 New Bag at 08/19/21 1118    levothyroxine (SYNTHROID) tablet 88 mcg  88 mcg Oral Daily Phong Jeffers MD   88 mcg at 08/19/21 0910    cyclobenzaprine (FLEXERIL) tablet 10 mg  10 mg Oral TID PRN Phong Jeffers MD   10 mg at 08/18/21 1851    benzocaine-menthol (CEPACOL SORE THROAT) lozenge 1 lozenge  1 lozenge Oral Q2H PRN EDUARDO Briceno NP   1 lozenge at 08/18/21 2113    0.9 % sodium chloride infusion   Intravenous PRN Antoni Beverly MD        albuterol (PROVENTIL) nebulizer solution 2.5 mg  2.5 mg Nebulization Q6H PRN Phong Jeffers MD        aspirin EC tablet 81 mg  81 mg Oral Daily Phong Jeffers MD   81 mg at 08/19/21 0910    atorvastatin (LIPITOR) tablet 10 mg  10 mg Oral Daily Phong Jeffers MD   10 mg at 08/19/21 0910    montelukast (SINGULAIR) tablet 10 mg  10 mg Oral Nightly Phong Jeffers MD   10 mg at 08/18/21 2112    sodium chloride flush 0.9 % injection 5-40 mL  5-40 mL Intravenous 2 times per day Phong Jeffers MD   10 mL at 08/19/21 0919    sodium chloride flush 0.9 % injection 5-40 mL  5-40 mL Intravenous PRN Phong Jeffers MD        0.9 % sodium chloride infusion  25 mL Intravenous PRN Phong Jeffers MD        ondansetron (ZOFRAN-ODT) disintegrating tablet 4 mg  4 mg Oral Q8H PRN Phong Jeffers MD        Or    ondansetron [facility]) injection 4 mg  4 mg Intravenous Q6H PRN Phong Jeffers MD        polyethylene glycol [facility]) packet 17 g  17 g Oral Daily PRN Isadore Rinne, MD        acetaminophen (TYLENOL) tablet 650 mg  650 mg Oral Q6H PRN Isadore Rinne, MD        Or    acetaminophen (TYLENOL) suppository 650 mg  650 mg Rectal Q6H PRN Isadore Rinne, MD        pantoprazole (PROTONIX) injection 40 mg  40 mg Intravenous BID Isadore Rinne, MD   40 mg at 08/19/21 0910       Allergies:     Allergies   Allergen Reactions    Seasonal      Pollen    Clindamycin Hcl Rash       Problem List:    Patient Active Problem List   Diagnosis Code    Primary localized osteoarthrosis, lower leg M17.10    Rotator cuff strain S46.019A    Shoulder pain M25.519    Complete rotator cuff tear M75.120    Colon ulcer K63.3    Rotator cuff arthropathy M12.819    Primary osteoarthritis of left knee M17.12    GI bleed K92.2    Obesity E66.9    Hyperkalemia E87.5    ARF (acute renal failure) (HCC) N17.9    Anemia D64.9    HTN (hypertension) I10    CAD (coronary artery disease) I25.10    Hyperlipidemia E78.5    Hypothyroid E03.9       Past Medical History:        Diagnosis Date    Arthritis     Asthma     COPD    CAD (coronary artery disease)     S/P CABG    Gastric bypass status for obesity     Hyperlipidemia     Hypertension     Low kidney function     Osteoarthritis     Thyroid disease        Past Surgical History:        Procedure Laterality Date    COLECTOMY  2003    COLONOSCOPY  5/11    diverticulosis    COLONOSCOPY  5/6/2016    Colon Ulcer    COLONOSCOPY N/A 6/21/2020    COLONOSCOPY DIAGNOSTIC performed by Venice Roach MD at [address] [place] [address]  2007    3 VESSEL    COSMETIC SURGERY      removed skin from stomach    FOOT SURGERY Bilateral     toenails removed    GASTRIC BYPASS SURGERY  2001    HERNIA REPAIR  1384    umbilical    NASAL SEPTUM SURGERY  1995    SHOULDER ARTHROSCOPY Right 9/8/15    rtc repair    THROAT SURGERY  1995    vocal cord     UPPER GASTROINTESTINAL ENDOSCOPY N/A 6/22/2020    EGD BIOPSY 08/19/2021    PROT 5.8 08/17/2021    PROT 6.3 01/31/2012    CALCIUM 7.7 08/19/2021    BILITOT 0.5 08/17/2021    ALKPHOS 90 08/17/2021    AST 28 08/17/2021    ALT 23 08/17/2021       POC Tests: No results for input(s): POCGLU, POCNA, POCK, POCCL, POCBUN, POCHEMO, POCHCT in the last 72 hours. Coags:   Lab Results   Component Value Date    PROTIME 14.3 08/17/2021    INR 1.25 08/17/2021    APTT 32.0 08/17/2021       HCG (If Applicable): No results found for: PREGTESTUR, PREGSERUM, HCG, HCGQUANT     ABGs: No results found for: PHART, PO2ART, DYI2LSI, LKD3EBN, BEART, B6KHCHDM     Type & Screen (If Applicable):  No results found for: LABABO, LABRH    Drug/Infectious Status (If Applicable):  No results found for: HIV, HEPCAB    COVID-19 Screening (If Applicable):   Lab Results   Component Value Date    COVID19 Not Detected 08/18/2021           Anesthesia Evaluation  Patient summary reviewed and Nursing notes reviewed  Airway: Mallampati: II  TM distance: <3 FB   Neck ROM: full  Mouth opening: < 3 FB Dental:      Comment: Poor dentition    Pulmonary:Negative Pulmonary ROS and normal exam  breath sounds clear to auscultation  (+) asthma:                            Cardiovascular:    (+) hypertension:, CAD:,         Rhythm: regular  Rate: normal                    Neuro/Psych:   Negative Neuro/Psych ROS              GI/Hepatic/Renal:   (+) PUD,           Endo/Other:    (+) hypothyroidism::., .                 Abdominal:   (+) obese,           Vascular: negative vascular ROS. Other Findings:             Anesthesia Plan      MAC     ASA 3       Induction: intravenous. Anesthetic plan and risks discussed with patient. Plan discussed with CRNA.                   Phyllis Dean MD   8/19/2021

## 2021-08-19 NOTE — OP NOTE
Esophagogastroduodenoscopy Note    Patient:   Sonia Villanueva    YOB: 1940    Facility:   [facility] [Inpatient]   Referring/PCP: Willy PONCE    Procedure:   Esophagogastroduodenoscopy --diagnostic  Date:     8/19/2021   Endoscopist:  Andrey Macias MD     Preoperative Diagnosis:   Maroon stools  Postoperative Diagnosis:  1 cm DU proximally w mild flat erythema    Anesthesia:  MAC  Estimated blood loss: None  Complications: None    Description of Procedure:  Informed consent was obtained from the patient after explanation of the procedure including indications, description of the procedure,  benefits and possible risks and complications of the procedure, and alternatives. Questions were answered. The patient's history was reviewed and a directed physical examination was performed prior to the procedure. Patient was monitored throughout the procedure with pulse oximetry and periodic assessment of vital signs. Patient was sedated as noted above. The Nursing staff and I performed a time out. With the patient in the left lateral decubitus position, the Olympus videoendoscope was placed in the patient's mouth and under direct visualization passed into the esophagus. The scope was ultimately passed to the third portion of the duodenum. Visualization was performed during both introduction and withdrawal of the endoscope and retroflexed view of the proximal stomach was obtained. Findings[de-identified]   Esophagus: normal. The findings do not support a diagnosis of Puri's Esophagus. Stomach: S/P subtotal gastrectomy/B-I  Duodenum: 1 cm DU proximally w mild flat erythema    Recommendations: -Continue acid suppression. , -Follow clinical symptoms and laboratory studies for evidence of rebleeding. , -Observe for at least another 24 hrs    Andrey Macias MD       O) [phone]          Andrey Macias MD, MD

## 2021-08-19 NOTE — CARE COORDINATION
Pt out of room in endoscopy when CM attempted to assess patient. Will attempt later today as time allows.

## 2021-08-19 NOTE — ANESTHESIA POSTPROCEDURE EVALUATION
Department of Anesthesiology  Postprocedure Note    Patient: Woodrow Ruiz  MRN: [id number]  YOB: 1940  Date of evaluation: 8/19/2021  Time:  2:33 PM     Procedure Summary     Date: 08/19/21 Room / Location: [address]    Anesthesia Start: 2580 Anesthesia Stop: 8121    Procedure: EGD ESOPHAGOGASTRODUODENOSCOPY (N/A ) Diagnosis: (GI bleed)    Surgeons: Halima Vera MD Responsible Provider: Balaji Winslow MD    Anesthesia Type: MAC ASA Status: 3          Anesthesia Type: No value filed. Soren Phase I: Soren Score: 10    Soren Phase II: Soren Score: 10    Last vitals: Reviewed and per EMR flowsheets.        Anesthesia Post Evaluation    Patient location during evaluation: PACU  Patient participation: complete - patient participated  Level of consciousness: awake and alert  Pain score: 0  Airway patency: patent  Nausea & Vomiting: no nausea and no vomiting  Complications: no  Cardiovascular status: blood pressure returned to baseline  Respiratory status: acceptable  Hydration status: stable

## 2021-08-19 NOTE — PROGRESS NOTES
Hospitalist Progress Note      PCP: Erin PONCE    Date of Admission: 8/17/2021    Chief Complaint: Rectal Bleeding    Subjective: no new c/o. Medications:  Reviewed    Infusion Medications    sodium chloride      sodium chloride       Scheduled Medications    levothyroxine  88 mcg Oral Daily    aspirin EC  81 mg Oral Daily    atorvastatin  10 mg Oral Daily    montelukast  10 mg Oral Nightly    sodium chloride flush  5-40 mL Intravenous 2 times per day    pantoprazole  40 mg Intravenous BID     PRN Meds: cyclobenzaprine, benzocaine-menthol, sodium chloride, albuterol, sodium chloride flush, sodium chloride, ondansetron **OR** ondansetron, polyethylene glycol, acetaminophen **OR** acetaminophen      Intake/Output Summary (Last 24 hours) at 8/19/2021 0919  Last data filed at 8/19/2021 0200  Gross per 24 hour   Intake 180 ml   Output 125 ml   Net 55 ml       Physical Exam Performed:    /71   Pulse 75   Temp 98.2 °F (36.8 °C) (Oral)   Resp 20   Ht 5' 8\" (1.727 m)   Wt 247 lb 9.6 oz (112.3 kg)   SpO2 94%   BMI 37.65 kg/m²     General appearance: No apparent distress, appears stated age and cooperative. HEENT: Pupils equal, round, and reactive to light. Conjunctivae/corneas clear. Neck: Supple, with full range of motion. No jugular venous distention. Trachea midline. Respiratory:  Normal respiratory effort. Clear to auscultation, bilaterally without Rales/Wheezes/Rhonchi. Cardiovascular: Regular rate and rhythm with normal S1/S2 without murmurs, rubs or gallops. Abdomen: Soft, non-tender, non-distended with normal bowel sounds. Musculoskeletal: No clubbing, cyanosis or edema bilaterally. Full range of motion without deformity. Skin: Skin color, texture, turgor normal.  No rashes or lesions. Neurologic:  Neurovascularly intact without any focal sensory/motor deficits.  Cranial nerves: II-XII intact, grossly non-focal.  Psychiatric: Alert and oriented, thought content Hypotensive on arrival w/ home meds held w/ vitals reviewed and documented as above.     HyperLipidemia - controlled on home Statin. Continue, w/ f/u and med adjustment w/ PCP     ARF - w/ elevated BUN/Cr ratio c/w pre-renal azotemia. Given IVF hydration and follow serial labs. Reviewed and documented as above.     HyperKalemia - etiology clinically unable to determine. Follow serial labs. Reviewed and documented as above.     HypoThyroid - clinically euthyroid on oral replacement therapy. Continue, w/ outpt monitoring as previously arranged.      Obesity -  With Body mass index is 39.53 kg/m². Complicating assessment and treatment. Placing patient at risk for multiple co-morbidities as well as early death and contributing to the patient's presentation.  Counseled on weight loss.        DVT Prophylaxis: IPC      Recent Labs     08/17/21  0556 08/18/21  0809 08/19/21  0557    143 157     Diet: Diet NPO Exceptions are: Sips of Water with Meds  Code Status: Full Code      PT/OT Eval Status: not yet ordered.      Dispo - Likely Thurs/Friday 19/20 August pending clinical course and subspecialty Miguel Powers MD

## 2021-08-19 NOTE — H&P
Pre-sedation Assessment    History and Physical / Pre-Sedation Assessment  Patient:  Chang Garcia   :   1940     Intended Procedure: EGD      HPI: 89 WC S with h/o HTN, HLD, CAD s/p CABG and s/p gastric bypass presents w recurrent hematochezia, similar to 2020 during which time he had w neg. Colonoscopy, and an 8mm white based  on EGD. He is on ASA and Protonix at home.  H/H 10/31, but was transfused in the ER due to hypotension.     Current Facility-Administered Medications   Medication Dose Route Frequency Provider Last Rate Last Admin    lactated ringers infusion   Intravenous Continuous Vidal Hence, MD 50 mL/hr at 21 1118 New Bag at 21 1118    levothyroxine (SYNTHROID) tablet 88 mcg  88 mcg Oral Daily Judene RidMD rodolfo   88 mcg at 21 0910    cyclobenzaprine (FLEXERIL) tablet 10 mg  10 mg Oral TID PRN Julia Mendoza MD   10 mg at 21    benzocaine-menthol (CEPACOL SORE THROAT) lozenge 1 lozenge  1 lozenge Oral Q2H PRN EDUARDO Stringer - NP   1 lozenge at 21    0.9 % sodium chloride infusion   Intravenous PRN Elvi Arias MD        albuterol (PROVENTIL) nebulizer solution 2.5 mg  2.5 mg Nebulization Q6H PRN Julia Mendoza MD        aspirin EC tablet 81 mg  81 mg Oral Daily Yvroseene RidMD rodolfo   81 mg at 21 0910    atorvastatin (LIPITOR) tablet 10 mg  10 mg Oral Daily Judene RidMD rodolfo   10 mg at 21 0910    montelukast (SINGULAIR) tablet 10 mg  10 mg Oral Nightly Yvroseene RidMD rodolfo   10 mg at 21 211    sodium chloride flush 0.9 % injection 5-40 mL  5-40 mL Intravenous 2 times per day Julia Mendoza MD   10 mL at 21 09    sodium chloride flush 0.9 % injection 5-40 mL  5-40 mL Intravenous PRN Julia Mendoza MD        0.9 % sodium chloride infusion  25 mL Intravenous PRN Julia Mendoza MD        ondansetron (ZOFRAN-ODT) disintegrating tablet 4 mg  4 mg Oral Q8H PRN Julia Mendoza MD        Or    ondansetron (ZOFRAN) injection 4 mg  4 mg Intravenous Q6H PRN Willie Chung MD        polyethylene glycol [facility]) packet 17 g  17 g Oral Daily PRN Willie Chung MD        acetaminophen (TYLENOL) tablet 650 mg  650 mg Oral Q6H PRN Willie Chung MD        Or    acetaminophen (TYLENOL) suppository 650 mg  650 mg Rectal Q6H PRN Willie Chung MD        pantoprazole (PROTONIX) injection 40 mg  40 mg Intravenous BID Willie Chung MD   40 mg at 08/19/21 3851     Past Medical History:   Diagnosis Date    Arthritis     Asthma     COPD    CAD (coronary artery disease)     S/P CABG    Gastric bypass status for obesity     Hyperlipidemia     Hypertension     Low kidney function     Osteoarthritis     Thyroid disease      Past Surgical History:   Procedure Laterality Date    COLECTOMY  2003    COLONOSCOPY  5/11    diverticulosis    COLONOSCOPY  5/6/2016    Colon Ulcer    COLONOSCOPY N/A 6/21/2020    COLONOSCOPY DIAGNOSTIC performed by Ancelmo Sauceda MD at [address] [place] [address]    3 VESSEL    COSMETIC SURGERY      removed skin from stomach    FOOT SURGERY Bilateral     toenails removed    GASTRIC BYPASS SURGERY  2001    HERNIA REPAIR  3680    umbilical    NASAL SEPTUM SURGERY  1995    SHOULDER ARTHROSCOPY Right 9/8/15    rtc repair    THROAT SURGERY  1995    vocal cord     UPPER GASTROINTESTINAL ENDOSCOPY N/A 6/22/2020    EGD BIOPSY performed by Ancelmo Sauceda MD at [place] [address] notes reviewed and agreed. Medications reviewed  Allergies: Allergies   Allergen Reactions    Seasonal      Pollen    Clindamycin Hcl Rash           Physical Exam:  Vital Signs: /71   Pulse 75   Temp 98.2 °F (36.8 °C) (Oral)   Resp 20   Ht 5' 8\" (1.727 m)   Wt 247 lb 9.6 oz (112.3 kg)   SpO2 94%   BMI 37.65 kg/m²  Body mass index is 37.65 kg/m².   Airway:Normal  Cardiac:Normal  Pulmonary:Normal  Abdomen:Normal  Specific to procedure: none      Pre-Procedure Assessment/Plan:  ASA 3 - Patient with moderate systemic disease with functional limitations  Mallampati II  Level of Sedation Plan:Deep sedation    Post Procedure plan: Return to same level of care    I assessed the patient and find that the patient is in satisfactory condition to proceed with the planned procedure and sedation plan. I have explained the risk, benefits, and alternatives to the procedure. The patient understands and agrees to proceed.   Yes    Dwaine Portillo MD       (O) [phone]          Dwaine Portillo MD  11:28 AM 8/19/2021

## 2021-08-19 NOTE — CARE COORDINATION
CASE MANAGEMENT INITIAL ASSESSMENT      Reviewed chart and completed assessment via telephone with:  Explained Case Management role/services. To patient    Primary contact information:see below    Health Care Decision Maker :   Primary Decision Maker: Tatiana Rae - [phone]    Secondary Decision Maker: Shana Velazquez - Tyshawn - [phone]          Can this person be reached and be able to respond quickly, such as within a few minutes or hours? Yes    Admit date/status:inpatient 8/17/2021  Diagnosis:GI Bleed  Is this a Readmission?:  No      Insurance:Medicare   Precert required for SNF: No       3 night stay required: No    Living arrangements, Adls, care needs, prior to admission:Lives at home with wife and 55year old retired disabled son    Transportation:TBD    [address] at home:  Walker_X_Cane_X_RTS__ BSC__Shower Chair__  02__ HHN__ CPAP__  BiPap__  Hospital Bed__ W/C___ Other_____X Rollator_____    Services in the home and/or outpatient, prior to admission:none active    PT/OT recs:not seen yet this admission    Hospital Exemption Notification (HEN):N/A    Barriers to discharge:None identified at present    Plan/comments:Patient plans to return home with spouse. He states he is independent with ADL's and does not think he will need services at discharge. Will follow for needs that may arise.      ECOC on chart for MD signature

## 2021-08-19 NOTE — PROGRESS NOTES
Pt transferred from A1 to B3. Report given to [facility]. All belongings transferred with pt. Pt stable at time of transfer.

## 2021-08-20 VITALS
BODY MASS INDEX: 37.53 KG/M2 | RESPIRATION RATE: 20 BRPM | HEIGHT: 68 IN | WEIGHT: 247.6 LBS | OXYGEN SATURATION: 91 % | SYSTOLIC BLOOD PRESSURE: 118 MMHG | TEMPERATURE: 98.1 F | DIASTOLIC BLOOD PRESSURE: 62 MMHG | HEART RATE: 73 BPM

## 2021-08-20 LAB
ALBUMIN SERPL-MCNC: 2.6 G/DL (ref 3.4–5)
ANION GAP SERPL CALCULATED.3IONS-SCNC: 10 MMOL/L (ref 3–16)
BUN BLDV-MCNC: 16 MG/DL (ref 7–20)
CALCIUM SERPL-MCNC: 7.9 MG/DL (ref 8.3–10.6)
CHLORIDE BLD-SCNC: 104 MMOL/L (ref 99–110)
CO2: 20 MMOL/L (ref 21–32)
CREAT SERPL-MCNC: 1.5 MG/DL (ref 0.8–1.3)
GFR AFRICAN AMERICAN: 54
GFR NON-AFRICAN AMERICAN: 45
GLUCOSE BLD-MCNC: 129 MG/DL (ref 70–99)
HCT VFR BLD CALC: 27.5 % (ref 40.5–52.5)
HEMOGLOBIN: 9.3 G/DL (ref 13.5–17.5)
MCH RBC QN AUTO: 32.5 PG (ref 26–34)
MCHC RBC AUTO-ENTMCNC: 33.8 G/DL (ref 31–36)
MCV RBC AUTO: 96.1 FL (ref 80–100)
PDW BLD-RTO: 14.8 % (ref 12.4–15.4)
PHOSPHORUS: 3.6 MG/DL (ref 2.5–4.9)
PLATELET # BLD: 187 K/UL (ref 135–450)
PMV BLD AUTO: 8 FL (ref 5–10.5)
POTASSIUM SERPL-SCNC: 4.5 MMOL/L (ref 3.5–5.1)
RBC # BLD: 2.86 M/UL (ref 4.2–5.9)
SODIUM BLD-SCNC: 134 MMOL/L (ref 136–145)
WBC # BLD: 4.9 K/UL (ref 4–11)

## 2021-08-20 PROCEDURE — 2580000003 HC RX 258: Performed by: INTERNAL MEDICINE

## 2021-08-20 PROCEDURE — C9113 INJ PANTOPRAZOLE SODIUM, VIA: HCPCS | Performed by: INTERNAL MEDICINE

## 2021-08-20 PROCEDURE — 36415 COLL VENOUS BLD VENIPUNCTURE: CPT

## 2021-08-20 PROCEDURE — 80069 RENAL FUNCTION PANEL: CPT

## 2021-08-20 PROCEDURE — 6370000000 HC RX 637 (ALT 250 FOR IP): Performed by: INTERNAL MEDICINE

## 2021-08-20 PROCEDURE — 6360000002 HC RX W HCPCS: Performed by: INTERNAL MEDICINE

## 2021-08-20 PROCEDURE — 85027 COMPLETE CBC AUTOMATED: CPT

## 2021-08-20 RX ORDER — CYCLOBENZAPRINE HCL 10 MG
10 TABLET ORAL 3 TIMES DAILY PRN
Qty: 30 TABLET | Refills: 0 | Status: SHIPPED | OUTPATIENT
Start: 2021-08-20 | End: 2021-08-30

## 2021-08-20 RX ORDER — PANTOPRAZOLE SODIUM 40 MG/1
40 TABLET, DELAYED RELEASE ORAL
Qty: 60 TABLET | Refills: 0 | Status: SHIPPED | OUTPATIENT
Start: 2021-08-20 | End: 2021-09-19

## 2021-08-20 RX ADMIN — SODIUM CHLORIDE, PRESERVATIVE FREE 10 ML: 5 INJECTION INTRAVENOUS at 09:05

## 2021-08-20 RX ADMIN — LEVOTHYROXINE SODIUM 88 MCG: 0.09 TABLET ORAL at 05:34

## 2021-08-20 RX ADMIN — CYCLOBENZAPRINE 10 MG: 10 TABLET, FILM COATED ORAL at 09:13

## 2021-08-20 RX ADMIN — PANTOPRAZOLE SODIUM 40 MG: 40 INJECTION, POWDER, FOR SOLUTION INTRAVENOUS at 09:04

## 2021-08-20 RX ADMIN — ATORVASTATIN CALCIUM 10 MG: 10 TABLET, FILM COATED ORAL at 09:04

## 2021-08-20 RX ADMIN — ASPIRIN 81 MG: 81 TABLET, COATED ORAL at 09:04

## 2021-08-20 ASSESSMENT — PAIN SCALES - GENERAL
PAINLEVEL_OUTOF10: 0
PAINLEVEL_OUTOF10: 0

## 2021-08-20 NOTE — PROGRESS NOTES
Progress Note  Date:2021       Room:0368/0368-01  Patient Gladies Gray     YOB: 1940     Age:81 y.o. Subjective    Subjective:  Symptoms:  Stable. Pain:  He reports no pain. Review of Systems  Objective         Vitals Last 24 Hours:  TEMPERATURE:  Temp  Av.3 °F (36.8 °C)  Min: 98.1 °F (36.7 °C)  Max: 98.6 °F (37 °C)  RESPIRATIONS RANGE: Resp  Av.4  Min: 16  Max: 20  PULSE OXIMETRY RANGE: SpO2  Av.8 %  Min: 70 %  Max: 99 %  PULSE RANGE: Pulse  Av.5  Min: 75  Max: 100  BLOOD PRESSURE RANGE: Systolic (35TNI), WZJ:274 , Min:116 , NXP:576   ; Diastolic (39ZXT), IYE:03, Min:58, Max:82    I/O (24Hr): Intake/Output Summary (Last 24 hours) at 2021 0704  Last data filed at 2021 0354  Gross per 24 hour   Intake 10 ml   Output 0 ml   Net 10 ml     Objective:  General Appearance:  Not in pain and in no acute distress. Vital signs: (most recent): Blood pressure 128/68, pulse 92, temperature 98.1 °F (36.7 °C), temperature source Oral, resp. rate 19, height 5' 8\" (1.727 m), weight 247 lb 9.6 oz (112.3 kg), SpO2 94 %. Abdomen: Abdomen is soft. Bowel sounds are normal.   There is no abdominal tenderness. Labs/Imaging/Diagnostics    Labs:  CBC:  Recent Labs     21  0809 21  0557   WBC  --  4.3 3.7*   RBC  --  2.90* 2.76*   HGB 10.0* 9.3* 9.0*   HCT 29.8* 27.7* 26.6*   MCV  --  95.5 96.3   RDW  --  15.2 14.8   PLT  --  143 157     CHEMISTRIES:  Recent Labs     21  0809 21  0557    136   K 5.0 4.4    108   CO2 20* 21   BUN 30* 23*   CREATININE 1.4* 1.5*   GLUCOSE 140* 144*   PHOS 3.1 3.4     PT/INR:No results for input(s): PROTIME, INR in the last 72 hours. APTT:No results for input(s): APTT in the last 72 hours. LIVER PROFILE:No results for input(s): AST, ALT, BILIDIR, BILITOT, ALKPHOS in the last 72 hours. Imaging Last 24 Hours:  No results found.   Assessment//Plan           Hospital Problems

## 2021-08-20 NOTE — CARE COORDINATION
CASE MANAGEMENT DISCHARGE SUMMARY      Discharge to: home         IMM given: (date) 8/20/21         Transportation:    Family/car:        Confirmed discharge plan with:     Patient: yes   DC to home, no needs.         RN, name:  Joceline Davis

## 2021-08-20 NOTE — PROGRESS NOTES
normal insight  Capillary Refill: Brisk,< 3 seconds   Peripheral Pulses: +2 palpable, equal bilaterally       Labs:   Recent Labs     08/17/21  2041 08/18/21  0809 08/19/21  0557   WBC  --  4.3 3.7*   HGB 10.0* 9.3* 9.0*   HCT 29.8* 27.7* 26.6*   PLT  --  143 157     Recent Labs     08/18/21  0809 08/19/21  0557    136   K 5.0 4.4    108   CO2 20* 21   BUN 30* 23*   CREATININE 1.4* 1.5*   CALCIUM 7.7* 7.7*   PHOS 3.1 3.4     No results for input(s): AST, ALT, BILIDIR, BILITOT, ALKPHOS in the last 72 hours. No results for input(s): INR in the last 72 hours. No results for input(s): Joeline Reeks in the last 72 hours. Urinalysis:      Lab Results   Component Value Date    NITRU NEGATIVE 01/31/2012    WBCUA Rare 01/31/2012    BACTERIA 2+ 01/31/2012    RBCUA 3-5 01/31/2012    BLOODU TRACE-INTACT 01/31/2012    SPECGRAV 1.025 01/31/2012    GLUCOSEU NEGATIVE 01/31/2012       Consults:    IP CONSULT TO GI  IP CONSULT TO HOSPITALIST      Assessment/Plan:    Active Hospital Problems    Diagnosis     Obesity [E66.9]     Hyperkalemia [E87.5]     ARF (acute renal failure) (HCC) [N17.9]     Anemia [D64.9]     HTN (hypertension) [I10]     CAD (coronary artery disease) [I25.10]     Hyperlipidemia [E78.5]     Hypothyroid [E03.9]     GI bleed [K92.2]        GI Bleed - likely lower GI Bleed, though had hx of PUD. On IV Protonix. GI consulted from ED s/p EGD AM 19 August w/ DU seen. Diet advanced and tolerated. .  Initially hypotensive but resolved.      Anemia - 2nd to acute GI blood loss w/out evidence of ongoing hemodynamically active bleeding/hemolysis. Stable and asymptomatic w/out indication for transfusion. Follow serial labs. Reviewed and documented as above.     HTN/CAD - w/ known CAD but no evidence of active signs/sxs of ischemia/failure. Hypotensive on arrival w/ home meds held w/ vitals reviewed and documented as above.     HyperLipidemia - controlled on home Statin.  Continue, w/ f/u and med adjustment w/ PCP     ARF - w/ elevated BUN/Cr ratio c/w pre-renal azotemia. Given IVF hydration and follow serial labs. Reviewed and documented as above.     HyperKalemia - etiology clinically unable to determine. Follow serial labs. Reviewed and documented as above.     HypoThyroid - clinically euthyroid on oral replacement therapy. Continue, w/ outpt monitoring as previously arranged.      Obesity -  With Body mass index is 39.53 kg/m². Complicating assessment and treatment. Placing patient at risk for multiple co-morbidities as well as early death and contributing to the patient's presentation. Counseled on weight loss.        DVT Prophylaxis: IPC      Recent Labs     08/18/21  0809 08/19/21  0557    157     Diet: ADULT DIET;  Regular  Code Status: Full Code      PT/OT Eval Status: not yet ordered.      Dispo - Likely Friday 20 August pending clinical course and subspecialty Socorro Lane MD

## 2021-08-21 LAB
BLOOD BANK DISPENSE STATUS: NORMAL
BLOOD BANK DISPENSE STATUS: NORMAL
BLOOD BANK PRODUCT CODE: NORMAL
BLOOD BANK PRODUCT CODE: NORMAL
BPU ID: NORMAL
BPU ID: NORMAL
DESCRIPTION BLOOD BANK: NORMAL
DESCRIPTION BLOOD BANK: NORMAL

## 2021-08-23 NOTE — DISCHARGE SUMMARY
Hospital Medicine Discharge Summary    Patient ID: Bhargavi Basurto      Patient's PCP: Cristiane PONCE    Admit Date: 8/17/2021     Discharge Date: 8/20/2021      Admitting Physician: Casimer Fabry, MD     Discharge Physician: Casimer Fabry, MD     Discharge Diagnoses: Active Hospital Problems    Diagnosis     Obesity [E66.9]     Hyperkalemia [E87.5]     ARF (acute renal failure) (HCC) [N17.9]     Anemia [D64.9]     HTN (hypertension) [I10]     CAD (coronary artery disease) [I25.10]     Hyperlipidemia [E78.5]     Hypothyroid [E03.9]     GI bleed [K92.2]        The patient was seen and examined on day of discharge and this discharge summary is in conjunction with any daily progress note from day of discharge. Hospital Course:        GI Bleed - likely lower GI Bleed, though had hx of PUD.  On IV Protonix.  GI consulted from ED s/p EGD AM 19 August w/ DU seen. Diet advanced and tolerated. Initially hypotensive but resolved.      Anemia - 2nd to acute GI blood loss w/out evidence of ongoing hemodynamically active bleeding/hemolysis.  Stable and asymptomatic w/out indication for transfusion.      HTN/CAD - w/ known CAD but no evidence of active signs/sxs of ischemia/failure. Hypotensive on arrival w/ home meds initially held w/ vitals reviewed and documented as above - OK to resume     HyperLipidemia - controlled on home Statin. Continue, w/ f/u and med adjustment w/ PCP     ARF - w/ elevated BUN/Cr ratio c/w pre-renal azotemia. Given IVF hydration and followed serial labs.       HyperKalemia - etiology clinically unable to determine.  Followed serial labs. .     HypoThyroid - clinically euthyroid on oral replacement therapy. Continue, w/ outpt monitoring as previously arranged.      Obesity -  With Body mass index is 06.18 kg/m². Complicating assessment and treatment. Placing patient at risk for multiple co-morbidities as well as early death and contributing to the patient's presentation. Counseled on weight loss.       Labs: For convenience and continuity at follow-up the following most recent labs are provided:      CBC:    Lab Results   Component Value Date    WBC 4.9 08/20/2021    HGB 9.3 08/20/2021    HCT 27.5 08/20/2021     08/20/2021       Renal:    Lab Results   Component Value Date     08/20/2021    K 4.5 08/20/2021    K 4.6 06/23/2020     08/20/2021    CO2 20 08/20/2021    BUN 16 08/20/2021    CREATININE 1.5 08/20/2021    CALCIUM 7.9 08/20/2021    PHOS 3.6 08/20/2021         Significant Diagnostic Studies    Radiology:   No orders to display          Consults:     IP CONSULT TO GI  IP CONSULT TO HOSPITALIST    Disposition: home     Condition at Discharge: Stable    Discharge Instructions/Follow-up:  w/ PCP 1-2 weeks and subspecialists as arranged.      Code Status:  Full code    Activity: activity as tolerated    Diet: regular diet      Discharge Medications:     Discharge Medication List as of 8/20/2021 12:24 PM           Details   cyclobenzaprine (FLEXERIL) 10 MG tablet Take 1 tablet by mouth 3 times daily as needed for Muscle spasms, Disp-30 tablet, R-0Print              Details   pantoprazole (PROTONIX) 40 MG tablet Take 1 tablet by mouth 2 times daily (before meals), Disp-60 tablet, R-0Print              Details   losartan (COZAAR) 25 MG tablet Take 25 mg by mouth dailyHistorical Med      aspirin EC 81 MG EC tablet Take 1 tablet by mouth daily, Disp-90 tablet, R-1Normal      Cholecalciferol (VITAMIN D-3 PO) Take by mouthHistorical Med      Coenzyme Q10 (COQ10 PO) Take by mouthHistorical Med      Biotin 5000 MCG TABS Take by mouthHistorical Med      lovastatin (MEVACOR) 20 MG tablet Take 20 mg by mouth nightlyHistorical Med      IRON CR PO Take by mouth daily      Multiple Vitamins-Minerals (THERAPEUTIC MULTIVITAMIN-MINERALS) tablet Take 1 tablet by mouth daily      Cyanocobalamin (VITAMIN B-12 CR PO) Take by mouth daily      Ascorbic Acid (VITAMIN C) 500 MG tablet Take 500 mg by mouth daily      albuterol (PROVENTIL) (2.5 MG/3ML) 0.083% nebulizer solution Take 2.5 mg by nebulization every 6 hours as needed for Wheezing      nitroGLYCERIN (NITROSTAT) 0.4 MG SL tablet Place 0.4 mg under the tongue every 5 minutes as needed. metoprolol (LOPRESSOR) 50 MG tablet Take 50 mg by mouth 2 times daily. levothyroxine (SYNTHROID) 100 MCG tablet Take 50 mcg by mouth daily. montelukast (SINGULAIR) 10 MG tablet Take 10 mg by mouth nightly. albuterol (PROVENTIL HFA;VENTOLIN HFA) 108 (90 BASE) MCG/ACT inhaler Inhale 2 puffs into the lungs every 6 hours as needed. Time Spent on discharge is more than 30 minutes in the examination, evaluation, counseling and review of medications and discharge plan. Signed:    Sajan Maria MD   8/23/2021      Thank you Lorena for the opportunity to be involved in this patient's care. If you have any questions or concerns please feel free to contact me at [phone].

## 2023-01-03 ENCOUNTER — HOSPITAL ENCOUNTER (EMERGENCY)
Age: 83
Discharge: HOME | End: 2023-01-03
Attending: EMERGENCY MEDICINE
Payer: MEDICARE

## 2023-01-03 ENCOUNTER — APPOINTMENT (OUTPATIENT)
Dept: CT IMAGING | Age: 83
End: 2023-01-03
Payer: MEDICARE

## 2023-01-03 VITALS
OXYGEN SATURATION: 96 % | BODY MASS INDEX: 36.37 KG/M2 | HEART RATE: 92 BPM | HEIGHT: 68 IN | TEMPERATURE: 98.8 F | WEIGHT: 240 LBS | RESPIRATION RATE: 16 BRPM | DIASTOLIC BLOOD PRESSURE: 81 MMHG | SYSTOLIC BLOOD PRESSURE: 141 MMHG

## 2023-01-03 DIAGNOSIS — R10.84 GENERALIZED ABDOMINAL PAIN: Primary | ICD-10-CM

## 2023-01-03 LAB
A/G RATIO: 1 (ref 1.1–2.2)
ALBUMIN SERPL-MCNC: 3.5 G/DL (ref 3.4–5)
ALP BLD-CCNC: 101 U/L (ref 40–129)
ALT SERPL-CCNC: 22 U/L (ref 10–40)
ANION GAP SERPL CALCULATED.3IONS-SCNC: 15 MMOL/L (ref 3–16)
AST SERPL-CCNC: 39 U/L (ref 15–37)
BASOPHILS ABSOLUTE: 0 K/UL (ref 0–0.2)
BASOPHILS RELATIVE PERCENT: 0.3 %
BILIRUB SERPL-MCNC: 0.5 MG/DL (ref 0–1)
BILIRUBIN URINE: NEGATIVE
BLOOD, URINE: ABNORMAL
BUN BLDV-MCNC: 17 MG/DL (ref 7–20)
CALCIUM SERPL-MCNC: 9 MG/DL (ref 8.3–10.6)
CHLORIDE BLD-SCNC: 99 MMOL/L (ref 99–110)
CLARITY: CLEAR
CO2: 20 MMOL/L (ref 21–32)
COLOR: YELLOW
CREAT SERPL-MCNC: 1.4 MG/DL (ref 0.8–1.3)
EKG ATRIAL RATE: 109 BPM
EKG DIAGNOSIS: NORMAL
EKG P AXIS: 56 DEGREES
EKG P-R INTERVAL: 188 MS
EKG Q-T INTERVAL: 388 MS
EKG QRS DURATION: 134 MS
EKG QTC CALCULATION (BAZETT): 522 MS
EKG R AXIS: -82 DEGREES
EKG T AXIS: 44 DEGREES
EKG VENTRICULAR RATE: 109 BPM
EOSINOPHILS ABSOLUTE: 0 K/UL (ref 0–0.6)
EOSINOPHILS RELATIVE PERCENT: 0.2 %
EPITHELIAL CELLS, UA: ABNORMAL /HPF (ref 0–5)
GFR SERPL CREATININE-BSD FRML MDRD: 50 ML/MIN/{1.73_M2}
GLUCOSE BLD-MCNC: 140 MG/DL (ref 70–99)
GLUCOSE URINE: NEGATIVE MG/DL
HCT VFR BLD CALC: 41.3 % (ref 40.5–52.5)
HEMOGLOBIN: 13.9 G/DL (ref 13.5–17.5)
KETONES, URINE: NEGATIVE MG/DL
LACTIC ACID: 1.3 MMOL/L (ref 0.4–2)
LEUKOCYTE ESTERASE, URINE: NEGATIVE
LIPASE: 20 U/L (ref 13–60)
LYMPHOCYTES ABSOLUTE: 0.5 K/UL (ref 1–5.1)
LYMPHOCYTES RELATIVE PERCENT: 10.4 %
MCH RBC QN AUTO: 31.9 PG (ref 26–34)
MCHC RBC AUTO-ENTMCNC: 33.8 G/DL (ref 31–36)
MCV RBC AUTO: 94.3 FL (ref 80–100)
MICROSCOPIC EXAMINATION: YES
MONOCYTES ABSOLUTE: 0.5 K/UL (ref 0–1.3)
MONOCYTES RELATIVE PERCENT: 10.1 %
NEUTROPHILS ABSOLUTE: 4 K/UL (ref 1.7–7.7)
NEUTROPHILS RELATIVE PERCENT: 79 %
NITRITE, URINE: NEGATIVE
PDW BLD-RTO: 14.3 % (ref 12.4–15.4)
PH UA: 5.5 (ref 5–8)
PLATELET # BLD: 97 K/UL (ref 135–450)
PLATELET SLIDE REVIEW: ABNORMAL
PMV BLD AUTO: 8.2 FL (ref 5–10.5)
POTASSIUM REFLEX MAGNESIUM: 4 MMOL/L (ref 3.5–5.1)
PROTEIN UA: 100 MG/DL
RBC # BLD: 4.38 M/UL (ref 4.2–5.9)
RBC UA: ABNORMAL /HPF (ref 0–4)
SLIDE REVIEW: ABNORMAL
SODIUM BLD-SCNC: 134 MMOL/L (ref 136–145)
SPECIFIC GRAVITY UA: 1.02 (ref 1–1.03)
TOTAL PROTEIN: 6.9 G/DL (ref 6.4–8.2)
URINE REFLEX TO CULTURE: ABNORMAL
URINE TYPE: ABNORMAL
UROBILINOGEN, URINE: 0.2 E.U./DL
WBC # BLD: 5.1 K/UL (ref 4–11)
WBC UA: ABNORMAL /HPF (ref 0–5)

## 2023-01-03 PROCEDURE — 96374 THER/PROPH/DIAG INJ IV PUSH: CPT | Performed by: EMERGENCY MEDICINE

## 2023-01-03 PROCEDURE — 99285 EMERGENCY DEPT VISIT HI MDM: CPT | Performed by: EMERGENCY MEDICINE

## 2023-01-03 PROCEDURE — 85025 COMPLETE CBC W/AUTO DIFF WBC: CPT

## 2023-01-03 PROCEDURE — 96361 HYDRATE IV INFUSION ADD-ON: CPT | Performed by: EMERGENCY MEDICINE

## 2023-01-03 PROCEDURE — 83605 ASSAY OF LACTIC ACID: CPT

## 2023-01-03 PROCEDURE — 2580000003 HC RX 258: Performed by: EMERGENCY MEDICINE

## 2023-01-03 PROCEDURE — 6360000004 HC RX CONTRAST MEDICATION: Performed by: EMERGENCY MEDICINE

## 2023-01-03 PROCEDURE — 93010 ELECTROCARDIOGRAM REPORT: CPT | Performed by: INTERNAL MEDICINE

## 2023-01-03 PROCEDURE — 80053 COMPREHEN METABOLIC PANEL: CPT

## 2023-01-03 PROCEDURE — 83690 ASSAY OF LIPASE: CPT

## 2023-01-03 PROCEDURE — 74177 CT ABD & PELVIS W/CONTRAST: CPT

## 2023-01-03 PROCEDURE — 6360000002 HC RX W HCPCS: Performed by: EMERGENCY MEDICINE

## 2023-01-03 PROCEDURE — 93005 ELECTROCARDIOGRAM TRACING: CPT | Performed by: EMERGENCY MEDICINE

## 2023-01-03 PROCEDURE — 81001 URINALYSIS AUTO W/SCOPE: CPT

## 2023-01-03 RX ORDER — 0.9 % SODIUM CHLORIDE 0.9 %
1000 INTRAVENOUS SOLUTION INTRAVENOUS ONCE
Status: COMPLETED | OUTPATIENT
Start: 2023-01-03 | End: 2023-01-03

## 2023-01-03 RX ORDER — KETOROLAC TROMETHAMINE 30 MG/ML
15 INJECTION, SOLUTION INTRAMUSCULAR; INTRAVENOUS ONCE
Status: COMPLETED | OUTPATIENT
Start: 2023-01-03 | End: 2023-01-03

## 2023-01-03 RX ORDER — HYDROCODONE BITARTRATE AND ACETAMINOPHEN 5; 325 MG/1; MG/1
1 TABLET ORAL EVERY 6 HOURS PRN
Qty: 10 TABLET | Refills: 0 | Status: SHIPPED | OUTPATIENT
Start: 2023-01-03 | End: 2023-01-06

## 2023-01-03 RX ORDER — MORPHINE SULFATE 4 MG/ML
4 INJECTION, SOLUTION INTRAMUSCULAR; INTRAVENOUS ONCE
Status: DISCONTINUED | OUTPATIENT
Start: 2023-01-03 | End: 2023-01-03

## 2023-01-03 RX ADMIN — SODIUM CHLORIDE 1000 ML: 9 INJECTION, SOLUTION INTRAVENOUS at 14:33

## 2023-01-03 RX ADMIN — KETOROLAC TROMETHAMINE 15 MG: 30 INJECTION, SOLUTION INTRAMUSCULAR at 14:33

## 2023-01-03 RX ADMIN — IOPAMIDOL 75 ML: 755 INJECTION, SOLUTION INTRAVENOUS at 13:53

## 2023-01-03 ASSESSMENT — PAIN SCALES - GENERAL
PAINLEVEL_OUTOF10: 5
PAINLEVEL_OUTOF10: 2

## 2023-01-03 ASSESSMENT — PAIN DESCRIPTION - LOCATION
LOCATION: ABDOMEN
LOCATION: ABDOMEN

## 2023-01-03 ASSESSMENT — PAIN DESCRIPTION - DESCRIPTORS: DESCRIPTORS: ACHING

## 2023-01-03 ASSESSMENT — PAIN - FUNCTIONAL ASSESSMENT: PAIN_FUNCTIONAL_ASSESSMENT: 0-10

## 2023-01-03 NOTE — ED PROVIDER NOTES
[facility]  ED  EMERGENCY DEPARTMENT ENCOUNTER      Pt Name: Boubacar Gutiérrez  MRN: [id number]  [place] 1940  Date of evaluation: 1/3/2023  Provider: Kristen Hughes MD    CHIEF COMPLAINT       Chief Complaint   Patient presents with    Abdominal Pain     Pt reporting he's had a couple of days of diarrhea and abdominal pain. Reports he called the [place] and was told to go to the nearest ED immediately. Diarrhea         HISTORY OF PRESENT ILLNESS   (Location/Symptom, Timing/Onset, Context/Setting, Quality, Duration, Modifying Factors, Severity)  Note limiting factors. Boubacar Gutiérrez is a 81 y.o. male with past medical history of hypertension, coronary artery disease, anemia, remote gastric bypass in ventral hernia repair here today for abdominal pain. Patient notes that for the last 2 to 3 days he has been having an aching abdominal pain associated with loss of appetite and occasional loose nonbloody stools. No fevers or chills. No nausea or vomiting. Denies chest pain, cough or shortness of breath. No obvious alleviating factors. [facility]    Nursing Notes were reviewed. REVIEW OF SYSTEMS    (2-9 systems for level 4, 10 or more for level 5)     Review of Systems    Please see HPI for pertinent positive and negative review of system findings. A full 10 system ROS was performed and otherwise negative.         PAST MEDICAL HISTORY     Past Medical History:   Diagnosis Date    Arthritis     Asthma     COPD    CAD (coronary artery disease)     S/P CABG    Gastric bypass status for obesity     Hyperlipidemia     Hypertension     Low kidney function     Osteoarthritis     Thyroid disease          SURGICAL HISTORY       Past Surgical History:   Procedure Laterality Date    COLECTOMY  2003    COLONOSCOPY  5/11    diverticulosis    COLONOSCOPY  5/6/2016    Colon Ulcer    COLONOSCOPY N/A 6/21/2020    COLONOSCOPY DIAGNOSTIC performed by Cain Robertson MD at [facility] GRAFT  2007    3 VESSEL    COSMETIC SURGERY      removed skin from stomach    FOOT SURGERY Bilateral     toenails removed    GASTRIC BYPASS SURGERY  2001    HERNIA REPAIR  2893    umbilical    NASAL SEPTUM SURGERY  1995    SHOULDER ARTHROSCOPY Right 9/8/15    rtc repair    THROAT SURGERY  1995    vocal cord     UPPER GASTROINTESTINAL ENDOSCOPY N/A 6/22/2020    EGD BIOPSY performed by Jordana Chan MD at [address] 8/19/2021    EGD ESOPHAGOGASTRODUODENOSCOPY performed by Jordana Chan MD at [address]       Previous Medications    ALBUTEROL (PROVENTIL HFA;VENTOLIN HFA) 108 (90 BASE) MCG/ACT INHALER    Inhale 2 puffs into the lungs every 6 hours as needed. ALBUTEROL (PROVENTIL) (2.5 MG/3ML) 0.083% NEBULIZER SOLUTION    Take 2.5 mg by nebulization every 6 hours as needed for Wheezing    ASCORBIC ACID (VITAMIN C) 500 MG TABLET    Take 500 mg by mouth daily    ASPIRIN EC 81 MG EC TABLET    Take 1 tablet by mouth daily    BIOTIN 5000 MCG TABS    Take by mouth    CHOLECALCIFEROL (VITAMIN D-3 PO)    Take by mouth    COENZYME Q10 (COQ10 PO)    Take by mouth    CYANOCOBALAMIN (VITAMIN B-12 CR PO)    Take by mouth daily    IRON CR PO    Take by mouth daily    LEVOTHYROXINE (SYNTHROID) 100 MCG TABLET    Take 50 mcg by mouth daily. LOSARTAN (COZAAR) 25 MG TABLET    Take 25 mg by mouth daily    LOVASTATIN (MEVACOR) 20 MG TABLET    Take 20 mg by mouth nightly    METOPROLOL (LOPRESSOR) 50 MG TABLET    Take 50 mg by mouth 2 times daily. MONTELUKAST (SINGULAIR) 10 MG TABLET    Take 10 mg by mouth nightly. MULTIPLE VITAMINS-MINERALS (THERAPEUTIC MULTIVITAMIN-MINERALS) TABLET    Take 1 tablet by mouth daily    NITROGLYCERIN (NITROSTAT) 0.4 MG SL TABLET    Place 0.4 mg under the tongue every 5 minutes as needed.       PANTOPRAZOLE (PROTONIX) 40 MG TABLET    Take 1 tablet by mouth 2 times daily (before meals)       ALLERGIES     Seasonal and Clindamycin hcl    FAMILY HISTORY       Family History   Problem Relation Age of Onset    Cancer Maternal Grandmother           SOCIAL HISTORY       Social History     Socioeconomic History    Marital status:      Spouse name: None    Number of children: None    Years of education: None    Highest education level: None   Tobacco Use    Smoking status: Former     Packs/day: 1.00     Years: 20.00     Pack years: 20.00     Types: Cigarettes    Smokeless tobacco: Never    Tobacco comments:     plans on quitting 9/2016 when he gets a total knee   Substance and Sexual Activity    Alcohol use: No     Comment: pt poor historian       SCREENINGS               PHYSICAL EXAM    (up to 7 for level 4, 8 or more for level 5)     ED Triage Vitals [01/03/23 1249]   BP Temp Temp Source Heart Rate Resp SpO2 Height Weight   (!) 156/78 97.6 °F (36.4 °C) Oral (!) 113 20 96 % 5' 8\" (1.727 m) 240 lb (108.9 kg)       Physical Exam    General appearance:  Cooperative. No acute distress. Skin:  Warm. Dry. Eye:  Extraocular movements intact. Ears, nose, mouth and throat:  Oral mucosa moist,  Neck:  Trachea midline. Heart:  Regular rate and rhythm  Perfusion:  intact  Respiratory:  Respirations nonlabored. Lungs clear to auscultation bilaterally. Abdominal: Distended abdomen. Extensive anterior abdominal wall surgical scarring that is remote in nature. There is an open defect to this inferiorly on the wound margin inferior to the umbilicus with a dressing in place but no surrounding erythema, discharge or drainage. There is a visible nonabsorbable suture at the site. Tenderness to palpation of the epigastrium with some guarding noted. Neurological:  Alert and oriented x 3.   Moves all extremities spontaneously  Musculoskeletal:   Normal ROM, no deformities          Psychiatric:  Normal mood      DIAGNOSTIC RESULTS       Labs Reviewed   CBC WITH AUTO DIFFERENTIAL - Abnormal; Notable for the following components: Result Value    Platelets 97 (*)     Lymphocytes Absolute 0.5 (*)     All other components within normal limits   COMPREHENSIVE METABOLIC PANEL W/ REFLEX TO MG FOR LOW K - Abnormal; Notable for the following components:    Sodium 134 (*)     CO2 20 (*)     Glucose 140 (*)     Creatinine 1.4 (*)     Est, Glom Filt Rate 50 (*)     Albumin/Globulin Ratio 1.0 (*)     AST 39 (*)     All other components within normal limits   URINALYSIS WITH REFLEX TO CULTURE - Abnormal; Notable for the following components:    Blood, Urine MODERATE (*)     Protein,  (*)     All other components within normal limits   MICROSCOPIC URINALYSIS - Abnormal; Notable for the following components:    RBC, UA 5-10 (*)     All other components within normal limits   LIPASE   LACTIC ACID       Interpretation per the Radiologist below, if obtained/available at the time of this note:    CT ABDOMEN PELVIS W IV CONTRAST Additional Contrast? None   Final Result   Diverticulosis. All other labs/imaging were within normal range or not returned as of this dictation. EMERGENCY DEPARTMENT COURSE and DIFFERENTIAL DIAGNOSIS/MDM:   Vitals:    Vitals:    01/03/23 1249 01/03/23 1435 01/03/23 1449   BP: (!) 156/78 (!) 141/81    Pulse: (!) 113 92    Resp: 20 16    Temp: 97.6 °F (36.4 °C)  98.8 °F (37.1 °C)   TempSrc: Oral  Oral   SpO2: 96% 96%    Weight: 240 lb (108.9 kg)     Height: 5' 8\" (1.727 m)         Patient presents to the emergency department today complaining of some generalized and epigastric abdominal pain. States has had some mild diarrhea. Some loss of appetite. History of extensive prior abdominal surgeries with extensive scarring in the abdomen. Small open area to the wound which is chronic and does not appear to be acutely inflamed or infected. Differential diagnosis would include bowel obstruction, colitis, pancreatitis, diverticulitis or underlying UTI.   No suspicion at present for cardiothoracic or respiratory etiology. Laboratory studies obtained showed no significant abnormality. Patient has chronic mild renal insufficiency without change. Lipase was normal.  A CT scan of the abdomen pelvis was performed showing diverticulosis but no other acute etiology and ruled out by other concerns. He was given IV pain medication with morphine IV fluids here. Feeling better. Tachycardia resolved. Abdomen soft. Tolerating oral intake and will be discharged home    History From: Patient          Chronic Conditions: See HPI    CONSULTS: (Who and What was discussed)  None    Discussion with Other Profesionals : None    Social Determinants : None        CC/HPI Summary, DDx, ED Course, and Reassessment: Reassessment for abdominal pain and ability to tolerate oral intake    Disposition Considerations (Tests not ordered but considered, Shared Decision Making, Pt Expectation of Test or Tx.):   Appropriate for outpatient management discussed care with patient and family who are in agreement the patient can be discharged home. Patient requested pain medication will be given a very short course to take as needed. Elaine Bingham M.D., am the primary clinician of record. MDM      CONSULTS     None    Critical Care:   None    REASSESSMENT          PROCEDURE     Unless otherwise noted below, none     Procedures      FINAL IMPRESSION      1. Generalized abdominal pain            DISPOSITION/PLAN   DISPOSITION Decision To Discharge 01/03/2023 03:33:14 PM        PATIENT REFERRED TO:  Kelly Mtz. Servando Negro    Schedule an appointment as soon as possible for a visit       DISCHARGE MEDICATIONS:  New Prescriptions    HYDROCODONE-ACETAMINOPHEN (NORCO) 5-325 MG PER TABLET    Take 1 tablet by mouth every 6 hours as needed for Pain for up to 3 days. Intended supply: 3 days.  Take lowest dose possible to manage pain Max Daily Amount: 4 tablets     Controlled Substances Monitoring: RX Monitoring 7/31/2015   Attestation The Prescription Monitoring Report for this patient was reviewed today. Periodic Controlled Substance Monitoring No signs of potential drug abuse or diversion identified.        (Please note that portions of this note were completed with a voice recognition program.  Efforts were made to edit the dictations but occasionally words are mis-transcribed.)    Alicia Chau MD (electronically signed)  Attending Emergency Physician            Patrick Martin MD  01/03/23 0304

## 2023-01-07 ENCOUNTER — APPOINTMENT (OUTPATIENT)
Dept: GENERAL RADIOLOGY | Age: 83
DRG: 191 | End: 2023-01-07
Payer: MEDICARE

## 2023-01-07 ENCOUNTER — APPOINTMENT (OUTPATIENT)
Dept: CT IMAGING | Age: 83
DRG: 191 | End: 2023-01-07
Payer: MEDICARE

## 2023-01-07 ENCOUNTER — HOSPITAL ENCOUNTER (INPATIENT)
Age: 83
LOS: 2 days | Discharge: HOME | DRG: 191 | End: 2023-01-09
Attending: EMERGENCY MEDICINE | Admitting: INTERNAL MEDICINE
Payer: MEDICARE

## 2023-01-07 DIAGNOSIS — R10.84 GENERALIZED ABDOMINAL PAIN: ICD-10-CM

## 2023-01-07 DIAGNOSIS — R09.02 HYPOXIA: Primary | ICD-10-CM

## 2023-01-07 DIAGNOSIS — J10.1 INFLUENZA A: ICD-10-CM

## 2023-01-07 DIAGNOSIS — J18.9 PNEUMONIA DUE TO INFECTIOUS ORGANISM, UNSPECIFIED LATERALITY, UNSPECIFIED PART OF LUNG: ICD-10-CM

## 2023-01-07 DIAGNOSIS — E87.1 HYPONATREMIA: ICD-10-CM

## 2023-01-07 PROBLEM — Z95.1 AORTOCORONARY BYPASS STATUS: Status: ACTIVE | Noted: 2023-01-07

## 2023-01-07 PROBLEM — J44.9 MODERATE COPD (CHRONIC OBSTRUCTIVE PULMONARY DISEASE) (HCC): Status: ACTIVE | Noted: 2023-01-07

## 2023-01-07 PROBLEM — N18.31 STAGE 3A CHRONIC KIDNEY DISEASE (HCC): Status: ACTIVE | Noted: 2023-01-07

## 2023-01-07 PROBLEM — E78.5 HYPERLIPIDEMIA: Status: RESOLVED | Noted: 2021-08-17 | Resolved: 2023-01-07

## 2023-01-07 PROBLEM — J44.1 ASTHMA WITH COPD WITH EXACERBATION (HCC): Status: ACTIVE | Noted: 2023-01-07

## 2023-01-07 PROBLEM — J45.901 ASTHMA WITH COPD WITH EXACERBATION (HCC): Status: ACTIVE | Noted: 2023-01-07

## 2023-01-07 LAB
A/G RATIO: 0.9 (ref 1.1–2.2)
ALBUMIN SERPL-MCNC: 3.6 G/DL (ref 3.4–5)
ALP BLD-CCNC: 143 U/L (ref 40–129)
ALT SERPL-CCNC: 31 U/L (ref 10–40)
ANION GAP SERPL CALCULATED.3IONS-SCNC: 12 MMOL/L (ref 3–16)
AST SERPL-CCNC: 54 U/L (ref 15–37)
BASOPHILS ABSOLUTE: 0 K/UL (ref 0–0.2)
BASOPHILS RELATIVE PERCENT: 0.2 %
BILIRUB SERPL-MCNC: 1 MG/DL (ref 0–1)
BILIRUBIN URINE: NEGATIVE
BLOOD, URINE: ABNORMAL
BUN BLDV-MCNC: 22 MG/DL (ref 7–20)
CALCIUM SERPL-MCNC: 8.4 MG/DL (ref 8.3–10.6)
CHLORIDE BLD-SCNC: 94 MMOL/L (ref 99–110)
CLARITY: CLEAR
CO2: 23 MMOL/L (ref 21–32)
COLOR: YELLOW
CREAT SERPL-MCNC: 1.4 MG/DL (ref 0.8–1.3)
EOSINOPHILS ABSOLUTE: 0 K/UL (ref 0–0.6)
EOSINOPHILS RELATIVE PERCENT: 0.3 %
EPITHELIAL CELLS, UA: NORMAL /HPF (ref 0–5)
GFR SERPL CREATININE-BSD FRML MDRD: 50 ML/MIN/{1.73_M2}
GLUCOSE BLD-MCNC: 148 MG/DL (ref 70–99)
GLUCOSE URINE: NEGATIVE MG/DL
HCT VFR BLD CALC: 40.4 % (ref 40.5–52.5)
HEMOGLOBIN: 13.6 G/DL (ref 13.5–17.5)
INFLUENZA A: DETECTED
INFLUENZA B: NOT DETECTED
INR BLD: 1.22 (ref 0.87–1.14)
KETONES, URINE: 15 MG/DL
LACTIC ACID, SEPSIS: 1.4 MMOL/L (ref 0.4–1.9)
LEUKOCYTE ESTERASE, URINE: NEGATIVE
LYMPHOCYTES ABSOLUTE: 0.5 K/UL (ref 1–5.1)
LYMPHOCYTES RELATIVE PERCENT: 5.9 %
MAGNESIUM: 2.3 MG/DL (ref 1.8–2.4)
MCH RBC QN AUTO: 31.7 PG (ref 26–34)
MCHC RBC AUTO-ENTMCNC: 33.5 G/DL (ref 31–36)
MCV RBC AUTO: 94.4 FL (ref 80–100)
MICROSCOPIC EXAMINATION: YES
MONOCYTES ABSOLUTE: 0.6 K/UL (ref 0–1.3)
MONOCYTES RELATIVE PERCENT: 7.1 %
NEUTROPHILS ABSOLUTE: 6.8 K/UL (ref 1.7–7.7)
NEUTROPHILS RELATIVE PERCENT: 86.5 %
NITRITE, URINE: NEGATIVE
PDW BLD-RTO: 13.8 % (ref 12.4–15.4)
PH UA: 5.5 (ref 5–8)
PLATELET # BLD: 122 K/UL (ref 135–450)
PMV BLD AUTO: 8.6 FL (ref 5–10.5)
POTASSIUM SERPL-SCNC: 4.2 MMOL/L (ref 3.5–5.1)
PROCALCITONIN: 0.15 NG/ML (ref 0–0.15)
PROTEIN UA: ABNORMAL MG/DL
PROTHROMBIN TIME: 15.3 SEC (ref 11.7–14.5)
RBC # BLD: 4.28 M/UL (ref 4.2–5.9)
RBC UA: NORMAL /HPF (ref 0–4)
SARS-COV-2 RNA, RT PCR: NOT DETECTED
SODIUM BLD-SCNC: 129 MMOL/L (ref 136–145)
SPECIFIC GRAVITY UA: 1.01 (ref 1–1.03)
TOTAL PROTEIN: 7.4 G/DL (ref 6.4–8.2)
TROPONIN: 0.02 NG/ML
URINE REFLEX TO CULTURE: ABNORMAL
URINE TYPE: ABNORMAL
UROBILINOGEN, URINE: 0.2 E.U./DL
WBC # BLD: 7.8 K/UL (ref 4–11)
WBC UA: NORMAL /HPF (ref 0–5)

## 2023-01-07 PROCEDURE — 83735 ASSAY OF MAGNESIUM: CPT

## 2023-01-07 PROCEDURE — 87641 MR-STAPH DNA AMP PROBE: CPT

## 2023-01-07 PROCEDURE — 71046 X-RAY EXAM CHEST 2 VIEWS: CPT

## 2023-01-07 PROCEDURE — 74177 CT ABD & PELVIS W/CONTRAST: CPT

## 2023-01-07 PROCEDURE — 80053 COMPREHEN METABOLIC PANEL: CPT

## 2023-01-07 PROCEDURE — 6360000002 HC RX W HCPCS: Performed by: INTERNAL MEDICINE

## 2023-01-07 PROCEDURE — 96365 THER/PROPH/DIAG IV INF INIT: CPT

## 2023-01-07 PROCEDURE — 1200000000 HC SEMI PRIVATE

## 2023-01-07 PROCEDURE — 94761 N-INVAS EAR/PLS OXIMETRY MLT: CPT

## 2023-01-07 PROCEDURE — 6360000004 HC RX CONTRAST MEDICATION: Performed by: PHYSICIAN ASSISTANT

## 2023-01-07 PROCEDURE — 6370000000 HC RX 637 (ALT 250 FOR IP): Performed by: INTERNAL MEDICINE

## 2023-01-07 PROCEDURE — 99285 EMERGENCY DEPT VISIT HI MDM: CPT

## 2023-01-07 PROCEDURE — 71260 CT THORAX DX C+: CPT | Performed by: PHYSICIAN ASSISTANT

## 2023-01-07 PROCEDURE — 2580000003 HC RX 258: Performed by: PHYSICIAN ASSISTANT

## 2023-01-07 PROCEDURE — 93005 ELECTROCARDIOGRAM TRACING: CPT | Performed by: EMERGENCY MEDICINE

## 2023-01-07 PROCEDURE — 84484 ASSAY OF TROPONIN QUANT: CPT

## 2023-01-07 PROCEDURE — 6360000002 HC RX W HCPCS: Performed by: PHYSICIAN ASSISTANT

## 2023-01-07 PROCEDURE — 85610 PROTHROMBIN TIME: CPT

## 2023-01-07 PROCEDURE — 81001 URINALYSIS AUTO W/SCOPE: CPT

## 2023-01-07 PROCEDURE — 96375 TX/PRO/DX INJ NEW DRUG ADDON: CPT

## 2023-01-07 PROCEDURE — 94640 AIRWAY INHALATION TREATMENT: CPT

## 2023-01-07 PROCEDURE — 87636 SARSCOV2 & INF A&B AMP PRB: CPT

## 2023-01-07 PROCEDURE — 85025 COMPLETE CBC W/AUTO DIFF WBC: CPT

## 2023-01-07 PROCEDURE — 84145 PROCALCITONIN (PCT): CPT

## 2023-01-07 PROCEDURE — 2700000000 HC OXYGEN THERAPY PER DAY

## 2023-01-07 PROCEDURE — 83605 ASSAY OF LACTIC ACID: CPT

## 2023-01-07 RX ORDER — CALCIUM CARBONATE 200(500)MG
1000 TABLET,CHEWABLE ORAL 3 TIMES DAILY PRN
Status: DISCONTINUED | OUTPATIENT
Start: 2023-01-07 | End: 2023-01-09 | Stop reason: HOSPADM

## 2023-01-07 RX ORDER — OSELTAMIVIR PHOSPHATE 75 MG/1
75 CAPSULE ORAL ONCE
Status: COMPLETED | OUTPATIENT
Start: 2023-01-07 | End: 2023-01-07

## 2023-01-07 RX ORDER — OSELTAMIVIR PHOSPHATE 30 MG/1
30 CAPSULE ORAL 2 TIMES DAILY
Status: DISCONTINUED | OUTPATIENT
Start: 2023-01-08 | End: 2023-01-09 | Stop reason: HOSPADM

## 2023-01-07 RX ORDER — IPRATROPIUM BROMIDE AND ALBUTEROL SULFATE 2.5; .5 MG/3ML; MG/3ML
1 SOLUTION RESPIRATORY (INHALATION) 4 TIMES DAILY
Status: DISCONTINUED | OUTPATIENT
Start: 2023-01-07 | End: 2023-01-09 | Stop reason: HOSPADM

## 2023-01-07 RX ORDER — SODIUM CHLORIDE, SODIUM LACTATE, POTASSIUM CHLORIDE, AND CALCIUM CHLORIDE .6; .31; .03; .02 G/100ML; G/100ML; G/100ML; G/100ML
1000 INJECTION, SOLUTION INTRAVENOUS ONCE
Status: COMPLETED | OUTPATIENT
Start: 2023-01-07 | End: 2023-01-07

## 2023-01-07 RX ORDER — LANOLIN ALCOHOL/MO/W.PET/CERES
3 CREAM (GRAM) TOPICAL NIGHTLY PRN
Status: DISCONTINUED | OUTPATIENT
Start: 2023-01-08 | End: 2023-01-09 | Stop reason: HOSPADM

## 2023-01-07 RX ORDER — ALBUTEROL SULFATE 2.5 MG/3ML
2.5 SOLUTION RESPIRATORY (INHALATION) EVERY 4 HOURS PRN
Status: DISCONTINUED | OUTPATIENT
Start: 2023-01-07 | End: 2023-01-09 | Stop reason: HOSPADM

## 2023-01-07 RX ORDER — MAGNESIUM HYDROXIDE/ALUMINUM HYDROXICE/SIMETHICONE 120; 1200; 1200 MG/30ML; MG/30ML; MG/30ML
30 SUSPENSION ORAL EVERY 6 HOURS PRN
Status: DISCONTINUED | OUTPATIENT
Start: 2023-01-07 | End: 2023-01-09 | Stop reason: HOSPADM

## 2023-01-07 RX ORDER — METHYLPREDNISOLONE SODIUM SUCCINATE 40 MG/ML
40 INJECTION, POWDER, LYOPHILIZED, FOR SOLUTION INTRAMUSCULAR; INTRAVENOUS EVERY 12 HOURS
Status: DISCONTINUED | OUTPATIENT
Start: 2023-01-07 | End: 2023-01-09

## 2023-01-07 RX ADMIN — IOPAMIDOL 75 ML: 755 INJECTION, SOLUTION INTRAVENOUS at 19:42

## 2023-01-07 RX ADMIN — IPRATROPIUM BROMIDE AND ALBUTEROL SULFATE 1 AMPULE: .5; 2.5 SOLUTION RESPIRATORY (INHALATION) at 22:26

## 2023-01-07 RX ADMIN — METHYLPREDNISOLONE SODIUM SUCCINATE 40 MG: 40 INJECTION, POWDER, FOR SOLUTION INTRAMUSCULAR; INTRAVENOUS at 22:00

## 2023-01-07 RX ADMIN — CEFTRIAXONE SODIUM 1000 MG: 1 INJECTION, POWDER, FOR SOLUTION INTRAMUSCULAR; INTRAVENOUS at 21:21

## 2023-01-07 RX ADMIN — OSELTAMIVIR 75 MG: 75 CAPSULE ORAL at 21:58

## 2023-01-07 RX ADMIN — SODIUM CHLORIDE, POTASSIUM CHLORIDE, SODIUM LACTATE AND CALCIUM CHLORIDE 1000 ML: 600; 310; 30; 20 INJECTION, SOLUTION INTRAVENOUS at 18:56

## 2023-01-07 ASSESSMENT — PAIN SCALES - GENERAL: PAINLEVEL_OUTOF10: 6

## 2023-01-07 ASSESSMENT — PAIN - FUNCTIONAL ASSESSMENT: PAIN_FUNCTIONAL_ASSESSMENT: 0-10

## 2023-01-07 NOTE — ED NOTES
Patient O2 sat dropped to 88% on RA while at rest, placed on 2L NC O2, patient now 94%. Patient reports hx of COPD but no oxygen requirement.      Obinna Juarez RN  01/07/23 0627

## 2023-01-08 LAB
A/G RATIO: 0.9 (ref 1.1–2.2)
ALBUMIN SERPL-MCNC: 3.3 G/DL (ref 3.4–5)
ALP BLD-CCNC: 121 U/L (ref 40–129)
ALT SERPL-CCNC: 28 U/L (ref 10–40)
ANION GAP SERPL CALCULATED.3IONS-SCNC: 13 MMOL/L (ref 3–16)
AST SERPL-CCNC: 46 U/L (ref 15–37)
BASOPHILS ABSOLUTE: 0 K/UL (ref 0–0.2)
BASOPHILS RELATIVE PERCENT: 0 %
BILIRUB SERPL-MCNC: 0.7 MG/DL (ref 0–1)
BUN BLDV-MCNC: 21 MG/DL (ref 7–20)
CALCIUM SERPL-MCNC: 8.3 MG/DL (ref 8.3–10.6)
CHLORIDE BLD-SCNC: 97 MMOL/L (ref 99–110)
CO2: 21 MMOL/L (ref 21–32)
CREAT SERPL-MCNC: 1.2 MG/DL (ref 0.8–1.3)
EKG ATRIAL RATE: 90 BPM
EKG DIAGNOSIS: NORMAL
EKG P AXIS: 61 DEGREES
EKG P-R INTERVAL: 202 MS
EKG Q-T INTERVAL: 412 MS
EKG QRS DURATION: 140 MS
EKG QTC CALCULATION (BAZETT): 504 MS
EKG R AXIS: -79 DEGREES
EKG T AXIS: 50 DEGREES
EKG VENTRICULAR RATE: 90 BPM
EOSINOPHILS ABSOLUTE: 0 K/UL (ref 0–0.6)
EOSINOPHILS RELATIVE PERCENT: 0 %
GFR SERPL CREATININE-BSD FRML MDRD: >60 ML/MIN/{1.73_M2}
GLUCOSE BLD-MCNC: 175 MG/DL (ref 70–99)
GLUCOSE BLD-MCNC: 176 MG/DL (ref 70–99)
GLUCOSE BLD-MCNC: 187 MG/DL (ref 70–99)
GLUCOSE BLD-MCNC: 194 MG/DL (ref 70–99)
GLUCOSE BLD-MCNC: 200 MG/DL (ref 70–99)
GLUCOSE BLD-MCNC: 209 MG/DL (ref 70–99)
HCT VFR BLD CALC: 38 % (ref 40.5–52.5)
HEMOGLOBIN: 12.8 G/DL (ref 13.5–17.5)
INR BLD: 1.21 (ref 0.87–1.14)
LYMPHOCYTES ABSOLUTE: 0.3 K/UL (ref 1–5.1)
LYMPHOCYTES RELATIVE PERCENT: 5.3 %
MAGNESIUM: 2.3 MG/DL (ref 1.8–2.4)
MCH RBC QN AUTO: 31.6 PG (ref 26–34)
MCHC RBC AUTO-ENTMCNC: 33.6 G/DL (ref 31–36)
MCV RBC AUTO: 94.1 FL (ref 80–100)
MONOCYTES ABSOLUTE: 0.1 K/UL (ref 0–1.3)
MONOCYTES RELATIVE PERCENT: 1.9 %
NEUTROPHILS ABSOLUTE: 5 K/UL (ref 1.7–7.7)
NEUTROPHILS RELATIVE PERCENT: 92.8 %
PDW BLD-RTO: 14 % (ref 12.4–15.4)
PERFORMED ON: ABNORMAL
PLATELET # BLD: 115 K/UL (ref 135–450)
PMV BLD AUTO: 8.1 FL (ref 5–10.5)
POTASSIUM SERPL-SCNC: 4.4 MMOL/L (ref 3.5–5.1)
PROTHROMBIN TIME: 15.2 SEC (ref 11.7–14.5)
RBC # BLD: 4.04 M/UL (ref 4.2–5.9)
SODIUM BLD-SCNC: 131 MMOL/L (ref 136–145)
TOTAL PROTEIN: 7 G/DL (ref 6.4–8.2)
WBC # BLD: 5.4 K/UL (ref 4–11)

## 2023-01-08 PROCEDURE — 85610 PROTHROMBIN TIME: CPT

## 2023-01-08 PROCEDURE — 1200000000 HC SEMI PRIVATE

## 2023-01-08 PROCEDURE — 6370000000 HC RX 637 (ALT 250 FOR IP): Performed by: INTERNAL MEDICINE

## 2023-01-08 PROCEDURE — 85025 COMPLETE CBC W/AUTO DIFF WBC: CPT

## 2023-01-08 PROCEDURE — 80053 COMPREHEN METABOLIC PANEL: CPT

## 2023-01-08 PROCEDURE — 94761 N-INVAS EAR/PLS OXIMETRY MLT: CPT

## 2023-01-08 PROCEDURE — 2580000003 HC RX 258: Performed by: INTERNAL MEDICINE

## 2023-01-08 PROCEDURE — 6360000002 HC RX W HCPCS: Performed by: INTERNAL MEDICINE

## 2023-01-08 PROCEDURE — 93010 ELECTROCARDIOGRAM REPORT: CPT | Performed by: INTERNAL MEDICINE

## 2023-01-08 PROCEDURE — 83735 ASSAY OF MAGNESIUM: CPT

## 2023-01-08 PROCEDURE — 6370000000 HC RX 637 (ALT 250 FOR IP): Performed by: PHYSICIAN ASSISTANT

## 2023-01-08 PROCEDURE — 36415 COLL VENOUS BLD VENIPUNCTURE: CPT

## 2023-01-08 PROCEDURE — 94640 AIRWAY INHALATION TREATMENT: CPT

## 2023-01-08 PROCEDURE — 83036 HEMOGLOBIN GLYCOSYLATED A1C: CPT

## 2023-01-08 PROCEDURE — 2700000000 HC OXYGEN THERAPY PER DAY

## 2023-01-08 RX ORDER — ACETAMINOPHEN 650 MG/1
650 SUPPOSITORY RECTAL EVERY 6 HOURS PRN
Status: DISCONTINUED | OUTPATIENT
Start: 2023-01-08 | End: 2023-01-09 | Stop reason: HOSPADM

## 2023-01-08 RX ORDER — ATORVASTATIN CALCIUM 10 MG/1
10 TABLET, FILM COATED ORAL DAILY
Status: DISCONTINUED | OUTPATIENT
Start: 2023-01-08 | End: 2023-01-09 | Stop reason: HOSPADM

## 2023-01-08 RX ORDER — ASPIRIN 81 MG/1
81 TABLET ORAL DAILY
Status: DISCONTINUED | OUTPATIENT
Start: 2023-01-08 | End: 2023-01-09 | Stop reason: HOSPADM

## 2023-01-08 RX ORDER — ONDANSETRON 4 MG/1
4 TABLET, ORALLY DISINTEGRATING ORAL EVERY 8 HOURS PRN
Status: DISCONTINUED | OUTPATIENT
Start: 2023-01-08 | End: 2023-01-08

## 2023-01-08 RX ORDER — DICYCLOMINE HCL 20 MG
20 TABLET ORAL
Status: DISCONTINUED | OUTPATIENT
Start: 2023-01-08 | End: 2023-01-09 | Stop reason: HOSPADM

## 2023-01-08 RX ORDER — SODIUM CHLORIDE 9 MG/ML
INJECTION, SOLUTION INTRAVENOUS PRN
Status: DISCONTINUED | OUTPATIENT
Start: 2023-01-08 | End: 2023-01-09 | Stop reason: HOSPADM

## 2023-01-08 RX ORDER — ACETAMINOPHEN 325 MG/1
650 TABLET ORAL EVERY 6 HOURS PRN
Status: DISCONTINUED | OUTPATIENT
Start: 2023-01-08 | End: 2023-01-09 | Stop reason: HOSPADM

## 2023-01-08 RX ORDER — ENOXAPARIN SODIUM 100 MG/ML
30 INJECTION SUBCUTANEOUS 2 TIMES DAILY
Status: DISCONTINUED | OUTPATIENT
Start: 2023-01-08 | End: 2023-01-09 | Stop reason: HOSPADM

## 2023-01-08 RX ORDER — SODIUM CHLORIDE 0.9 % (FLUSH) 0.9 %
10 SYRINGE (ML) INJECTION PRN
Status: DISCONTINUED | OUTPATIENT
Start: 2023-01-08 | End: 2023-01-09 | Stop reason: HOSPADM

## 2023-01-08 RX ORDER — INSULIN LISPRO 100 [IU]/ML
0-4 INJECTION, SOLUTION INTRAVENOUS; SUBCUTANEOUS
Status: DISCONTINUED | OUTPATIENT
Start: 2023-01-08 | End: 2023-01-09 | Stop reason: HOSPADM

## 2023-01-08 RX ORDER — POTASSIUM CHLORIDE 7.45 MG/ML
10 INJECTION INTRAVENOUS PRN
Status: DISCONTINUED | OUTPATIENT
Start: 2023-01-08 | End: 2023-01-09 | Stop reason: HOSPADM

## 2023-01-08 RX ORDER — PANTOPRAZOLE SODIUM 40 MG/1
40 TABLET, DELAYED RELEASE ORAL
Status: DISCONTINUED | OUTPATIENT
Start: 2023-01-08 | End: 2023-01-09 | Stop reason: HOSPADM

## 2023-01-08 RX ORDER — DEXTROSE MONOHYDRATE 100 MG/ML
INJECTION, SOLUTION INTRAVENOUS CONTINUOUS PRN
Status: DISCONTINUED | OUTPATIENT
Start: 2023-01-08 | End: 2023-01-09 | Stop reason: HOSPADM

## 2023-01-08 RX ORDER — LEVOTHYROXINE SODIUM 88 UG/1
88 TABLET ORAL
Status: DISCONTINUED | OUTPATIENT
Start: 2023-01-08 | End: 2023-01-09 | Stop reason: HOSPADM

## 2023-01-08 RX ORDER — CYCLOBENZAPRINE HCL 10 MG
10 TABLET ORAL 3 TIMES DAILY PRN
COMMUNITY
Start: 2021-10-12

## 2023-01-08 RX ORDER — INSULIN GLARGINE 100 [IU]/ML
0.15 INJECTION, SOLUTION SUBCUTANEOUS NIGHTLY
Status: DISCONTINUED | OUTPATIENT
Start: 2023-01-08 | End: 2023-01-09 | Stop reason: HOSPADM

## 2023-01-08 RX ORDER — VITAMIN B COMPLEX
1000 TABLET ORAL DAILY
Status: DISCONTINUED | OUTPATIENT
Start: 2023-01-08 | End: 2023-01-09 | Stop reason: HOSPADM

## 2023-01-08 RX ORDER — POTASSIUM CHLORIDE 20 MEQ/1
40 TABLET, EXTENDED RELEASE ORAL PRN
Status: DISCONTINUED | OUTPATIENT
Start: 2023-01-08 | End: 2023-01-09 | Stop reason: HOSPADM

## 2023-01-08 RX ORDER — INSULIN LISPRO 100 [IU]/ML
0.05 INJECTION, SOLUTION INTRAVENOUS; SUBCUTANEOUS
Status: DISCONTINUED | OUTPATIENT
Start: 2023-01-08 | End: 2023-01-09 | Stop reason: HOSPADM

## 2023-01-08 RX ORDER — ONDANSETRON 2 MG/ML
4 INJECTION INTRAMUSCULAR; INTRAVENOUS EVERY 6 HOURS PRN
Status: DISCONTINUED | OUTPATIENT
Start: 2023-01-08 | End: 2023-01-08

## 2023-01-08 RX ORDER — MAGNESIUM SULFATE 1 G/100ML
1000 INJECTION INTRAVENOUS PRN
Status: DISCONTINUED | OUTPATIENT
Start: 2023-01-08 | End: 2023-01-09 | Stop reason: HOSPADM

## 2023-01-08 RX ORDER — MONTELUKAST SODIUM 10 MG/1
10 TABLET ORAL NIGHTLY
Status: DISCONTINUED | OUTPATIENT
Start: 2023-01-08 | End: 2023-01-09 | Stop reason: HOSPADM

## 2023-01-08 RX ORDER — METOPROLOL TARTRATE 50 MG/1
50 TABLET, FILM COATED ORAL 2 TIMES DAILY
Status: DISCONTINUED | OUTPATIENT
Start: 2023-01-08 | End: 2023-01-09 | Stop reason: HOSPADM

## 2023-01-08 RX ORDER — INSULIN LISPRO 100 [IU]/ML
0-4 INJECTION, SOLUTION INTRAVENOUS; SUBCUTANEOUS NIGHTLY
Status: DISCONTINUED | OUTPATIENT
Start: 2023-01-08 | End: 2023-01-09 | Stop reason: HOSPADM

## 2023-01-08 RX ADMIN — Medication 10 ML: at 20:39

## 2023-01-08 RX ADMIN — METOPROLOL TARTRATE 50 MG: 50 TABLET, FILM COATED ORAL at 08:12

## 2023-01-08 RX ADMIN — LEVOTHYROXINE SODIUM 88 MCG: 88 TABLET ORAL at 06:02

## 2023-01-08 RX ADMIN — Medication 1000 UNITS: at 08:12

## 2023-01-08 RX ADMIN — IPRATROPIUM BROMIDE AND ALBUTEROL SULFATE 1 AMPULE: .5; 2.5 SOLUTION RESPIRATORY (INHALATION) at 15:29

## 2023-01-08 RX ADMIN — METHYLPREDNISOLONE SODIUM SUCCINATE 40 MG: 40 INJECTION, POWDER, FOR SOLUTION INTRAMUSCULAR; INTRAVENOUS at 11:18

## 2023-01-08 RX ADMIN — PANTOPRAZOLE SODIUM 40 MG: 40 TABLET, DELAYED RELEASE ORAL at 17:16

## 2023-01-08 RX ADMIN — ASPIRIN 81 MG: 81 TABLET, COATED ORAL at 08:12

## 2023-01-08 RX ADMIN — METHYLPREDNISOLONE SODIUM SUCCINATE 40 MG: 40 INJECTION, POWDER, FOR SOLUTION INTRAMUSCULAR; INTRAVENOUS at 20:39

## 2023-01-08 RX ADMIN — INSULIN GLARGINE 16 UNITS: 100 INJECTION, SOLUTION SUBCUTANEOUS at 20:35

## 2023-01-08 RX ADMIN — ACETAMINOPHEN 325MG 650 MG: 325 TABLET ORAL at 00:54

## 2023-01-08 RX ADMIN — INSULIN LISPRO 5 UNITS: 100 INJECTION, SOLUTION INTRAVENOUS; SUBCUTANEOUS at 13:14

## 2023-01-08 RX ADMIN — IPRATROPIUM BROMIDE AND ALBUTEROL SULFATE 1 AMPULE: .5; 2.5 SOLUTION RESPIRATORY (INHALATION) at 12:01

## 2023-01-08 RX ADMIN — MONTELUKAST SODIUM 10 MG: 10 TABLET ORAL at 20:41

## 2023-01-08 RX ADMIN — ENOXAPARIN SODIUM 30 MG: 100 INJECTION SUBCUTANEOUS at 20:36

## 2023-01-08 RX ADMIN — IPRATROPIUM BROMIDE AND ALBUTEROL SULFATE 1 AMPULE: .5; 2.5 SOLUTION RESPIRATORY (INHALATION) at 08:13

## 2023-01-08 RX ADMIN — PANTOPRAZOLE SODIUM 40 MG: 40 TABLET, DELAYED RELEASE ORAL at 04:18

## 2023-01-08 RX ADMIN — IPRATROPIUM BROMIDE AND ALBUTEROL SULFATE 1 AMPULE: .5; 2.5 SOLUTION RESPIRATORY (INHALATION) at 19:54

## 2023-01-08 RX ADMIN — ENOXAPARIN SODIUM 30 MG: 100 INJECTION SUBCUTANEOUS at 08:15

## 2023-01-08 RX ADMIN — INSULIN LISPRO 5 UNITS: 100 INJECTION, SOLUTION INTRAVENOUS; SUBCUTANEOUS at 17:18

## 2023-01-08 RX ADMIN — ATORVASTATIN CALCIUM 10 MG: 10 TABLET, FILM COATED ORAL at 08:12

## 2023-01-08 RX ADMIN — DICYCLOMINE HYDROCHLORIDE 20 MG: 20 TABLET ORAL at 17:16

## 2023-01-08 RX ADMIN — OSELTAMIVIR PHOSPHATE 30 MG: 30 CAPSULE ORAL at 20:42

## 2023-01-08 RX ADMIN — METOPROLOL TARTRATE 50 MG: 50 TABLET, FILM COATED ORAL at 20:42

## 2023-01-08 RX ADMIN — OSELTAMIVIR PHOSPHATE 30 MG: 30 CAPSULE ORAL at 08:12

## 2023-01-08 RX ADMIN — DICYCLOMINE HYDROCHLORIDE 20 MG: 20 TABLET ORAL at 20:41

## 2023-01-08 ASSESSMENT — PAIN SCALES - GENERAL
PAINLEVEL_OUTOF10: 4
PAINLEVEL_OUTOF10: 0

## 2023-01-08 ASSESSMENT — LIFESTYLE VARIABLES: HOW OFTEN DO YOU HAVE A DRINK CONTAINING ALCOHOL: NEVER

## 2023-01-08 ASSESSMENT — PAIN DESCRIPTION - DESCRIPTORS: DESCRIPTORS: ACHING

## 2023-01-08 ASSESSMENT — PAIN DESCRIPTION - ORIENTATION: ORIENTATION: RIGHT;LEFT

## 2023-01-08 ASSESSMENT — PAIN DESCRIPTION - LOCATION: LOCATION: ABDOMEN;LEG

## 2023-01-08 NOTE — ED PROVIDER NOTES
I independently performed a history and physical on Юлия Ordonez. All diagnostic, treatment, and disposition decisions were made by myself in conjunction with the advanced practice provider. For further details of [address] [place] [address] emergency department encounter, please see BEAU Cain's documentation. Patient is here because he is not acting right at home according to family. Patient was recently treated here for abdominal pain and diarrhea 4 days ago. He is awake and alert and oriented x3. He has a history of COPD and coronary disease. On exam patient's heart is regular rate and rhythm and lungs with few scattered rhonchi but otherwise clear to auscultation. With mild upper abdominal tenderness. No rebound, rigidity or guarding noted. EKG  The Ekg interpreted by me shows  normal sinus rhythm with a rate of 90  Axis is   Left axis deviation  QTc is   504ms  RBBB  ST Segments: no acute change  No significant change from prior EKG dated 3 medardo 2023      Labs Reviewed   CBC WITH AUTO DIFFERENTIAL - Abnormal; Notable for the following components:       Result Value    Hematocrit 40.4 (*)     Platelets 734 (*)     Lymphocytes Absolute 0.5 (*)     All other components within normal limits   COMPREHENSIVE METABOLIC PANEL - Abnormal; Notable for the following components:    Sodium 129 (*)     Chloride 94 (*)     Glucose 148 (*)     BUN 22 (*)     Creatinine 1.4 (*)     Est, Glom Filt Rate 50 (*)     Albumin/Globulin Ratio 0.9 (*)     Alkaline Phosphatase 143 (*)     AST 54 (*)     All other components within normal limits   COVID-19 & INFLUENZA COMBO   LACTATE, SEPSIS   URINALYSIS WITH REFLEX TO CULTURE   MAGNESIUM   TROPONIN     CT CHEST PULMONARY EMBOLISM W CONTRAST   Final Result   Motion artifact limiting the exam along the lung bases. Within the   limitations of the exam, no obvious acute pulmonary embolus can be seen.       Moderate bibasilar atelectasis and/or infiltrates versus scarring and   associated tiny bibasilar pleural effusions which is more prominent on the   left. Recommend follow-up with serial chest x-rays. Mildly dilated atherosclerotic thoracic aorta with no aneurysm or dissection. Extensive coronary artery calcifications. 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 Previous gastric bypass surgery which is unchanged with no bowel obstruction      Moderate diverticulosis throughout the left colon with no pericolonic   inflammation. CT ABDOMEN PELVIS W IV CONTRAST Additional Contrast? None   Final Result   Motion artifact limiting the exam along the lung bases. Within the   limitations of the exam, no obvious acute pulmonary embolus can be seen. Moderate bibasilar atelectasis and/or infiltrates versus scarring and   associated tiny bibasilar pleural effusions which is more prominent on the   left. Recommend follow-up with serial chest x-rays. Mildly dilated atherosclerotic thoracic aorta with no aneurysm or dissection. Extensive coronary artery calcifications. 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 Previous gastric bypass surgery which is unchanged with no bowel obstruction      Moderate diverticulosis throughout the left colon with no pericolonic   inflammation. XR CHEST (2 VW)   Final Result   No acute process. Bibasilar hypoaeration           I personally saw this patient and performed a substantive portion of the visit including all aspects of the medical decision making. MDM  Patient is hypoxic and requiring supplemental oxygen. For this reason and his altered mental status I believe he would benefit from hospitalization. Patient is influenza A positive.   We are also treating with antibiotics for community-acquired pneumonia given the possibility of bacterial superinfection. I personally saw this patient and independently provided 20 minutes of non-concurrent critical care out of the total shared critical care time provided.         Brenda Harrington MD  01/07/23 3221

## 2023-01-08 NOTE — PROGRESS NOTES
Pt AOx4, VSS, shift assessment completed and charted. Pt c/o mild ABD pain, scheduled protonix given, pt states \"I think it is because I have eaten so little\". Pt did have snacks and half a sandwich in the ER, will see how pt does after breakfast. Pt with profuse abdominal scarring from severe history of abdominal and gastric surgeries, with 2 small stitches noted on abdomen. LDA created. No drainage noted, except there is a very small, black spot in the center of the stitches. Pt with no pain in that area, and states he has \"had it forever\". Pt normally ambulates independently with cane, but due to malaise and general weakness, has been instructed to c/o and c/o appropriately, and ambulates SBA with walker. Pt breath sounds diminished with crackles, and pt is on 2L NC, RA their baseline. Pt with vascular discoloration BLE, scattered bruising, and redness. No redness on coccyx. Pt with tele orders but [facility]. Pt denies further needs, and was in stable condiiton when this RN left the room. Bed is low, locked, alarmed, and pt in chair with call light/bedside table in reach. All care per orders, will continue to assess as appropriate. Electronically signed by Lakisha Lemus RN on 1/8/2023 at 4:51 AM    Pt now on tele. NSR with PVCs.  Electronically signed by Lakisha Lemus RN on 1/8/2023 at 6:06 AM

## 2023-01-08 NOTE — PROGRESS NOTES
Pt alert and oriented. BP elevated, medications given per [facility]; all other VSS. O2 92% on 2L NC. Assessment completed as charted. Crackles noted in bilateral bases. Pt still with poor appetite. States had BM this morning. Up in chair, denies pain or needs at present time. Call light and bedside table within reach.

## 2023-01-08 NOTE — H&P
Hospital Medicine History & Physical      Patient: Yaa Gutierrez  :  1940  MRN:  [id number]    Date of Service: 23    Chief Complaint   Patient presents with    Other     Per EMS patient's family called for \"not acting right\" patient alert and oriented x4 at triage and has no acute complaints other than still having abd pain and diarrhea from evaluation here on 1/3/23       HISTORY OF PRESENT ILLNESS:    Yaa Gutierrez is a 81 y.o. male. He presented to the ER from home by ambulance. He relates he has felt ill for about 4 days. He describes feeling tired, loss of appetite, dyspnea, wheezing, nonproductive cough, and \"upset stomach\" with diarrhea. He denies rigors, swaets, and recorded fevers at home. He has had very little to eat or drink since symptom onset. Patient has a h/o moderate COPD-asthma overlap syndrome. He quit smoking in . He smoked 1 pack/day for about 30 years. Patient previously received most of his care through the [address] [place] [address]. He has been transitioning care to the [place] system over the past year. Review of Systems:  All pertinent positives and negatives are as noted in the HPI section. All other systems were reviewed and are negative.     Past Medical History:   Diagnosis Date    Arthritis     Asthma     COPD    CAD (coronary artery disease)     S/P CABG    Gastric bypass status for obesity     Hyperlipidemia     Hypertension     Low kidney function     Osteoarthritis     Thyroid disease        Past Surgical History:   Procedure Laterality Date    COLECTOMY      COLONOSCOPY      diverticulosis    COLONOSCOPY  2016    Colon Ulcer    COLONOSCOPY N/A 2020    COLONOSCOPY DIAGNOSTIC performed by Magdalene Gottlieb MD at [address] [place] [address]      3 VESSEL    COSMETIC SURGERY      removed skin from stomach    FOOT SURGERY Bilateral     toenails removed    GASTRIC BYPASS SURGERY      HERNIA REPAIR   umbilical    NASAL SEPTUM SURGERY  1995    SHOULDER ARTHROSCOPY Right 9/8/15    rtc repair    THROAT SURGERY  1995    vocal cord     UPPER GASTROINTESTINAL ENDOSCOPY N/A 6/22/2020    EGD BIOPSY performed by Violette Peralta MD at [facility] 8/19/2021    EGD ESOPHAGOGASTRODUODENOSCOPY performed by Violette Peralta MD at [facility]         Prior to Admission medications    Medication Sig Start Date End Date Taking? Authorizing Provider   pantoprazole (PROTONIX) 40 MG tablet Take 1 tablet by mouth 2 times daily (before meals) 8/20/21 9/19/21  Jackeline Vergara MD   losartan (COZAAR) 25 MG tablet Take 25 mg by mouth daily    Historical Provider, MD   aspirin EC 81 MG EC tablet Take 1 tablet by mouth daily 6/23/20   Rd Perez MD   Cholecalciferol (VITAMIN D-3 PO) Take by mouth    Historical Provider, MD   Coenzyme Q10 (COQ10 PO) Take by mouth    Historical Provider, MD   Biotin 5000 MCG TABS Take by mouth    Historical Provider, MD   lovastatin (MEVACOR) 20 MG tablet Take 20 mg by mouth nightly    Historical Provider, MD   IRON CR PO Take by mouth daily    Historical Provider, MD   Multiple Vitamins-Minerals (THERAPEUTIC MULTIVITAMIN-MINERALS) tablet Take 1 tablet by mouth daily    Historical Provider, MD   Cyanocobalamin (VITAMIN B-12 CR PO) Take by mouth daily    Historical Provider, MD   Ascorbic Acid (VITAMIN C) 500 MG tablet Take 500 mg by mouth daily    Historical Provider, MD   albuterol (PROVENTIL) (2.5 MG/3ML) 0.083% nebulizer solution Take 2.5 mg by nebulization every 6 hours as needed for Wheezing    Historical Provider, MD   nitroGLYCERIN (NITROSTAT) 0.4 MG SL tablet Place 0.4 mg under the tongue every 5 minutes as needed. Historical Provider, MD   metoprolol (LOPRESSOR) 50 MG tablet Take 50 mg by mouth 2 times daily. Historical Provider, MD   levothyroxine (SYNTHROID) 100 MCG tablet Take 50 mcg by mouth daily.       Historical Provider, MD montelukast (SINGULAIR) 10 MG tablet Take 10 mg by mouth nightly. Historical Provider, MD   albuterol (PROVENTIL HFA;VENTOLIN HFA) 108 (90 BASE) MCG/ACT inhaler Inhale 2 puffs into the lungs every 6 hours as needed. Historical Provider, MD       Allergies:   Seasonal and Clindamycin hcl    Social:   reports that he has quit smoking. His smoking use included cigarettes. He has a 20.00 pack-year smoking history. He has never used smokeless tobacco.   reports no history of alcohol use. Social History     Substance and Sexual Activity   Drug Use Not on file       Family History   Problem Relation Age of Onset    Cancer Maternal Grandmother        PHYSICAL EXAM:  I performed this physical examination. Vitals:  Patient Vitals for the past 24 hrs:   BP Temp Temp src Pulse Resp SpO2 Height Weight   01/07/23 2113 (!) 140/75 -- -- 88 22 94 % -- --   01/07/23 2058 (!) 141/83 -- -- 89 21 93 % -- --   01/07/23 2044 120/75 -- -- 89 18 94 % -- --   01/07/23 2030 (!) 141/72 -- -- 91 23 91 % -- --   01/07/23 2014 (!) 140/74 -- -- 91 25 -- -- --   01/07/23 1959 (!) 141/77 -- -- 91 21 95 % -- --   01/07/23 1924 (!) 153/73 -- -- 86 26 93 % -- --   01/07/23 1908 (!) 151/77 -- -- 86 26 92 % -- --   01/07/23 1854 (!) 144/78 -- -- 88 21 90 % -- --   01/07/23 1839 (!) 104/91 -- -- 89 21 91 % -- --   01/07/23 1824 (!) 147/81 -- -- 90 26 90 % -- --   01/07/23 1800 136/81 -- -- 93 26 93 % -- --   01/07/23 1745 (!) 158/76 -- -- 93 27 91 % -- --   01/07/23 1727 -- 99.8 °F (37.7 °C) Oral -- -- -- -- --   01/07/23 1723 (!) 113/93 -- -- 98 18 90 % 5' 8\" (1.727 m) 240 lb (108.9 kg)     2 L/min O2    GEN:  Appearance:  Elderly WM in NAD . Level of Consciousness:  alert . Orientation:  full    HEENT: Sclera anicteric.  no conjunctival chemosis. moist mucus membranes. no specific or diagnostic oral lesions. NECK:  no signs of meningismus. Jugular veins not distended. Carotid pulses  2+.  no cervical lymphadenopathy.   no thyromegaly. CV:  regular rhythm. normal S1 & S2.    no murmur. no rub.  no gallop. PULM:  Chest excursion is symmetric. Breath sounds are generally vesicular. Adventitious sounds:  there are faint bibasilar right > left inspiratory crackles and a prolonged expiratory phase w/o audible wheezing    AB:  Abdominal shape is normal.  Bowel sounds are active. Generally soft to palpation. no tenderness is present. no involuntary guarding. no rebound guarding. EXTR:  Skin is warm. Capillary refill brisk. no specific or pathognomic rash. no clubbing. trace pitting edema. no active wound or ulcer. Pulses 2+ x 4    LABS:  Lab Results   Component Value Date    WBC 7.8 01/07/2023    HGB 13.6 01/07/2023    HCT 40.4 (L) 01/07/2023    MCV 94.4 01/07/2023     (L) 01/07/2023     Lab Results   Component Value Date    CREATININE 1.4 (H) 01/07/2023    BUN 22 (H) 01/07/2023     (L) 01/07/2023    K 4.2 01/07/2023    CL 94 (L) 01/07/2023    CO2 23 01/07/2023     Lab Results   Component Value Date    ALT 31 01/07/2023    AST 54 (H) 01/07/2023    ALKPHOS 143 (H) 01/07/2023    BILITOT 1.0 01/07/2023     Lab Results   Component Value Date    TROPONINI 0.02 (H) 01/07/2023     No results for input(s): PHART, [id number], PO2ART in the last 72 hours. IMAGING:  XR CHEST (2 VW)    Result Date: 1/7/2023  EXAMINATION: TWO XRAY VIEWS OF THE CHEST 1/7/2023 6:02 pm COMPARISON: 07/09/2021 HISTORY: ORDERING SYSTEM PROVIDED HISTORY: Hypoxia, hx of COPD TECHNOLOGIST PROVIDED HISTORY: Reason for exam:->Hypoxia, hx of COPD Reason for Exam: hypoxia, hx of COPD FINDINGS: Status post median sternotomy. The lungs are without acute focal process. Bibasilar hypoaeration. There is no effusion or pneumothorax. The cardiomediastinal silhouette is stable. The osseous structures are stable. No acute process.  Bibasilar hypoaeration     CT ABDOMEN PELVIS W IV CONTRAST Additional Contrast? None    Result Date: 1/7/2023  EXAMINATION: CTA OF THE CHEST; CT OF THE ABDOMEN AND PELVIS WITH CONTRAST 1/7/2023 7:24 pm TECHNIQUE: CTA of the chest was performed after the administration of intravenous contrast.  Multiplanar reformatted images are provided for review. MIP images are provided for review. Automated exposure control, iterative reconstruction, and/or weight based adjustment of the mA/kV was utilized to reduce the radiation dose to as low as reasonably achievable.; CT of the abdomen and pelvis was performed with the administration of intravenous contrast. Multiplanar reformatted images are provided for review. Automated exposure control, iterative reconstruction, and/or weight based adjustment of the mA/kV was utilized to reduce the radiation dose to as low as reasonably achievable. COMPARISON: 01/03/2003 HISTORY: ORDERING SYSTEM PROVIDED HISTORY: Hypoxia, shortness of breath, weakness TECHNOLOGIST PROVIDED HISTORY: Reason for exam:->Hypoxia, shortness of breath, weakness Decision Support Exception - unselect if not a suspected or confirmed emergency medical condition->Emergency Medical Condition (MA) Reason for Exam: SOB weakness Relevant Medical/Surgical History: Smoker for 45 years, dx COPD FINDINGS: CTA chest: Pulmonary Arteries: Pulmonary arteries are adequately opacified for evaluation. No evidence of intraluminal filling defect to suggest pulmonary embolism. Main pulmonary artery is normal in caliber. Motion artifact partially obscuring the distal pulmonary arteries along the lung bases posteriorly. Mediastinum: There are postop changes along the mediastinum and sternum. No evidence of mediastinal lymphadenopathy. The heart and pericardium demonstrate no acute abnormality. There are extensive calcifications along the coronary arteries. There are calcified lymph nodes along the subcarinal region. Lungs/pleura:  There is motion artifact along the lung bases limiting the exam.  The lungs are hyperinflated and emphysematous. There is hazy ground-glass and interstitial opacities along the lung bases posteriorly with mild air bronchograms throughout and associated mild bronchiectasis. There is no pulmonary nodule or mass. There are small bibasilar pleural effusions layering posteriorly which is more prominent on the left. CT Abdomen and Pelvis: The liver and spleen are normal size. The gallbladder has been removed with clips in the gallbladder fossa. The common duct is mildly dilated proximally and tapers distally to the ampulla which is unchanged with no filling defect. The pancreas and adrenals are normal.  The kidneys are normal size and function normally with no hydronephrosis or renal stones. The ureters are normal caliber. There are surgical clips along the EG junction and sutures along the proximal stomach extending into the small bowel which is unchanged. There is no bowel obstruction. There surgical clips along the anterior abdomen extending inferiorly. The appendix is not well visualized. The colon is normal caliber. The small bowel is unremarkable. There are diverticula throughout the left colon with no pericolonic inflammation. The bladder is unremarkable. The prostate gland is top-normal.  No adenopathy or ascites is seen. The mesentery is unremarkable. There is calcified plaque throughout the aorta with no aneurysm and no retroperitoneal mass or adenopathy. The bones are intact. No aggressive osseous lesion is seen. Motion artifact limiting the exam along the lung bases. Within the limitations of the exam, no obvious acute pulmonary embolus can be seen. Moderate bibasilar atelectasis and/or infiltrates versus scarring and associated tiny bibasilar pleural effusions which is more prominent on the left. Recommend follow-up with serial chest x-rays. Mildly dilated atherosclerotic thoracic aorta with no aneurysm or dissection. Extensive coronary artery calcifications.   Old healed granulomatous disease in the mediastinum Previous cholecystectomy and postop changes along the upper abdomen no acute abnormality seen. Previous gastric bypass surgery which is unchanged with no bowel obstruction Moderate diverticulosis throughout the left colon with no pericolonic inflammation. CT ABDOMEN PELVIS W IV CONTRAST Additional Contrast? None    Result Date: 1/3/2023  EXAMINATION: CT OF THE ABDOMEN AND PELVIS WITH CONTRAST 1/3/2023 1:45 pm TECHNIQUE: CT of the abdomen and pelvis was performed with the administration of intravenous contrast. Multiplanar reformatted images are provided for review. Automated exposure control, iterative reconstruction, and/or weight based adjustment of the mA/kV was utilized to reduce the radiation dose to as low as reasonably achievable. COMPARISON: 05/02/2011 HISTORY: ORDERING SYSTEM PROVIDED HISTORY: epigastric abd pain TECHNOLOGIST PROVIDED HISTORY: Additional Contrast?->None Reason for exam:->epigastric abd pain Decision Support Exception - unselect if not a suspected or confirmed emergency medical condition->Emergency Medical Condition (MA) Reason for Exam: epigastric pain x 2 days-no appetite Relevant Medical/Surgical History: bariatric surg-umbilical hernia repair FINDINGS: Lower Chest: Mild bibasilar bronchiectatic changes are noted along with mild chronic pleuroparenchymal scarring. The pleural spaces are clear. Organs: The liver, pancreas, spleen, kidneys and adrenals are unremarkable. The gallbladder is surgically absent. GI/Bowel: There is no bowel dilatation, wall thickening or obstruction. Diverticular changes are scattered throughout the left hemicolon. Postop changes of gastric bypass are noted. Pelvis: The bladder and prostate are unremarkable. Peritoneum/Retroperitoneum: There is no free air, free fluid or intraperitoneal inflammatory change. There is no adenopathy. Postop changes of ventral abdominal hernia repair are present.  Bones/Soft Tissues: There is no acute fracture or aggressive osseous lesion. Diverticulosis. CT CHEST PULMONARY EMBOLISM W CONTRAST    Result Date: 1/7/2023  EXAMINATION: CTA OF THE CHEST; CT OF THE ABDOMEN AND PELVIS WITH CONTRAST 1/7/2023 7:24 pm TECHNIQUE: CTA of the chest was performed after the administration of intravenous contrast.  Multiplanar reformatted images are provided for review. MIP images are provided for review. Automated exposure control, iterative reconstruction, and/or weight based adjustment of the mA/kV was utilized to reduce the radiation dose to as low as reasonably achievable.; CT of the abdomen and pelvis was performed with the administration of intravenous contrast. Multiplanar reformatted images are provided for review. Automated exposure control, iterative reconstruction, and/or weight based adjustment of the mA/kV was utilized to reduce the radiation dose to as low as reasonably achievable. COMPARISON: 01/03/2003 HISTORY: ORDERING SYSTEM PROVIDED HISTORY: Hypoxia, shortness of breath, weakness TECHNOLOGIST PROVIDED HISTORY: Reason for exam:->Hypoxia, shortness of breath, weakness Decision Support Exception - unselect if not a suspected or confirmed emergency medical condition->Emergency Medical Condition (MA) Reason for Exam: SOB weakness Relevant Medical/Surgical History: Smoker for 45 years, dx COPD FINDINGS: CTA chest: Pulmonary Arteries: Pulmonary arteries are adequately opacified for evaluation. No evidence of intraluminal filling defect to suggest pulmonary embolism. Main pulmonary artery is normal in caliber. Motion artifact partially obscuring the distal pulmonary arteries along the lung bases posteriorly. Mediastinum: There are postop changes along the mediastinum and sternum. No evidence of mediastinal lymphadenopathy. The heart and pericardium demonstrate no acute abnormality. There are extensive calcifications along the coronary arteries.   There are calcified lymph nodes along the subcarinal region. Lungs/pleura: There is motion artifact along the lung bases limiting the exam.  The lungs are hyperinflated and emphysematous. There is hazy ground-glass and interstitial opacities along the lung bases posteriorly with mild air bronchograms throughout and associated mild bronchiectasis. There is no pulmonary nodule or mass. There are small bibasilar pleural effusions layering posteriorly which is more prominent on the left. CT Abdomen and Pelvis: The liver and spleen are normal size. The gallbladder has been removed with clips in the gallbladder fossa. The common duct is mildly dilated proximally and tapers distally to the ampulla which is unchanged with no filling defect. The pancreas and adrenals are normal.  The kidneys are normal size and function normally with no hydronephrosis or renal stones. The ureters are normal caliber. There are surgical clips along the EG junction and sutures along the proximal stomach extending into the small bowel which is unchanged. There is no bowel obstruction. There surgical clips along the anterior abdomen extending inferiorly. The appendix is not well visualized. The colon is normal caliber. The small bowel is unremarkable. There are diverticula throughout the left colon with no pericolonic inflammation. The bladder is unremarkable. The prostate gland is top-normal.  No adenopathy or ascites is seen. The mesentery is unremarkable. There is calcified plaque throughout the aorta with no aneurysm and no retroperitoneal mass or adenopathy. The bones are intact. No aggressive osseous lesion is seen. Motion artifact limiting the exam along the lung bases. Within the limitations of the exam, no obvious acute pulmonary embolus can be seen. Moderate bibasilar atelectasis and/or infiltrates versus scarring and associated tiny bibasilar pleural effusions which is more prominent on the left. Recommend follow-up with serial chest x-rays.  Mildly dilated atherosclerotic thoracic aorta with no aneurysm or dissection. Extensive coronary artery calcifications. Old healed granulomatous disease in the mediastinum Previous cholecystectomy and postop changes along the upper abdomen no acute abnormality seen. Previous gastric bypass surgery which is unchanged with no bowel obstruction Moderate diverticulosis throughout the left colon with no pericolonic inflammation. I directly reviewed all recent imaging studies as well as pertinent prior studies. Radiology reports may or may not be available at the time of my review. EKG:  New and pertinent prior tracings were directly reviewed. My interpretation is as follows:  pending    Active Hospital Problems    Diagnosis Date Noted    Moderate COPD (chronic obstructive pulmonary disease) ([facility] [J44.9] 01/07/2023     Priority: Medium    Asthma with COPD with exacerbation ([facility] [J44.1, J45.901] 01/07/2023     Priority: Medium    Stage 3a chronic kidney disease ([facility] [N18.31] 01/07/2023     Priority: Medium    Aortocoronary bypass status [Z95.1] 01/07/2023     Priority: Medium    Influenza A [J10.1] 01/07/2023     Priority: Medium    CAD (coronary artery disease) [I25.10] 08/17/2021       ASSESSMENT & PLAN  Moderate COPD/asthma w/ exacerbation, Influenza A  -  Titrate level of respiratory support according to patient's needs. Target goal SpO2 = 90-94%. Currently patient is requiring 2 L/min O2 support. At baseline he does not require supplemental O2.  -  Start solumedrol 40mg IV q12h, scheduled duonebs, prn albuterol nebs, and prn antitussives. Continue home montelukast.  -  Oseltamivir 75mg once then 30mg BID planned for a five day course. -  Monitor for signs of an evolving secondary bacterial pneumonia. Low suspicion at the time of admission.  -  Cultrures:  blood, sputum (if any), MRSA DNA nasal probe    CKD 3a  -  Baseline SCr ~ 1.4 and currently 1.4.   Patient appears nearly euvolemic.  -  Losartan held while acutely ill out of caution. CAD s/p CABG (2012)  -  TTE 7/30/2021:  LVEF = 50-55%. Moderate RVH and RV systolic dysfunction. Otherwise only mild abnormalities. -  Continue home ASA, statin, metoprolol. DM2  -  Hold all oral antidiabetic agents. Start s.c. Insulin regimen based on weight. H/O Upper GI Bleeding  -  Continue BID PPI. DVT prophylaxis: SCDs, lovenox  Code Status:  Full code  Disposition:  Inpatient pending progress.     Westley Vincent MD MD

## 2023-01-08 NOTE — PROGRESS NOTES
PS to Stephane, NP @ [facility], just an [place]. Pt has tele orders but Kathrin Yin is completely out of tele boxes. Also, pt is allergic to lovastatin, it causes itching, and currently is taking it without issues, eMAR flagged so just letting you know. Thank you. \"

## 2023-01-08 NOTE — PROGRESS NOTES
Hospitalist Progress Note      PCP: Shellie PONCE    Date of Admission: 1/7/2023    Chief Complaint:   Lethargy, sob     Hospital Course:   Jc Damon is a 81 y.o. male. He presented to the ER from home by ambulance. He relates he has felt ill for about 4 days. He describes feeling tired, loss of appetite, dyspnea, wheezing, nonproductive cough, and \"upset stomach\" with diarrhea. He denies rigors, swaets, and recorded fevers at home. He has had very little to eat or drink since symptom onset. Patient has a h/o moderate COPD-asthma overlap syndrome. He quit smoking in 2020. He smoked 1 pack/day for about 30 years. Patient previously received most of his care through the [facility] [place]. He has been transitioning care to the [place] system over the past year. Subjective:   Seen resting in chair at bedside, on supplemental O2, states he is feeling better and his breathing is improving, updated on plan of care.         Medications:  Reviewed    Infusion Medications    dextrose      sodium chloride       Scheduled Medications    aspirin EC  81 mg Oral Daily    metoprolol tartrate  50 mg Oral BID    montelukast  10 mg Oral Nightly    pantoprazole  40 mg Oral BID AC    enoxaparin  30 mg SubCUTAneous BID    insulin glargine  0.15 Units/kg SubCUTAneous Nightly    insulin lispro  0.05 Units/kg SubCUTAneous TID WC    insulin lispro  0-4 Units SubCUTAneous TID WC    insulin lispro  0-4 Units SubCUTAneous Nightly    levothyroxine  88 mcg Oral QAM AC    atorvastatin  10 mg Oral Daily    Vitamin D  1,000 Units Oral Daily    oseltamivir  30 mg Oral BID    ipratropium-albuterol  1 ampule Inhalation 4x daily    methylPREDNISolone  40 mg IntraVENous Q12H     PRN Meds: glucose, dextrose bolus **OR** dextrose bolus, glucagon (rDNA), dextrose, sodium chloride flush, sodium chloride, potassium chloride **OR** potassium alternative oral replacement **OR** potassium chloride, magnesium sulfate, acetaminophen **OR** acetaminophen, albuterol, aluminum & magnesium hydroxide-simethicone, melatonin, calcium carbonate      Intake/Output Summary (Last 24 hours) at 1/8/2023 0955  Last data filed at 1/8/2023 0408  Gross per 24 hour   Intake 120 ml   Output --   Net 120 ml       Physical Exam Performed:    BP (!) 170/89   Pulse 77   Temp 97.5 °F (36.4 °C) (Oral)   Resp 22   Ht 5' 8\" (1.727 m)   Wt 230 lb (104.3 kg)   SpO2 92%   BMI 34.97 kg/m²     General appearance: No apparent distress, appears stated age and cooperative. HEENT: Pupils equal, round, and reactive to light. Conjunctivae/corneas clear. Neck: Supple, with full range of motion. No jugular venous distention. Trachea midline. Respiratory: On 2L via NC. Normal respiratory effort. Clear to auscultation, bilaterally  Cardiovascular: Regular rate and rhythm with normal S1/S2 without murmurs, rubs or gallops. Abdomen: Soft, non-tender, non-distended with normal bowel sounds. Musculoskeletal: No clubbing, cyanosis or edema bilaterally. Full range of motion without deformity. Skin: Skin color, texture, turgor normal.  No rashes or lesions. Neurologic:  Neurovascularly intact without any focal sensory/motor deficits.  Cranial nerves: II-XII intact, grossly non-focal.  Psychiatric: Alert and oriented, thought content appropriate, normal insight  Capillary Refill: Brisk, 3 seconds, normal   Peripheral Pulses: +2 palpable, equal bilaterally       Labs:   Recent Labs     01/07/23 1740 01/08/23  0502   WBC 7.8 5.4   HGB 13.6 12.8*   HCT 40.4* 38.0*   * 115*     Recent Labs     01/07/23 1740 01/08/23  0502   * 131*   K 4.2 4.4   CL 94* 97*   CO2 23 21   BUN 22* 21*   CREATININE 1.4* 1.2   CALCIUM 8.4 8.3     Recent Labs     01/07/23 1740 01/08/23  0502   AST 54* 46*   ALT 31 28   BILITOT 1.0 0.7   ALKPHOS 143* 121     Recent Labs     01/07/23 2143 01/08/23  0502   INR 1.22* 1.21*     Recent Labs     01/07/23  1740   TROPONINI 0.02*       Urinalysis: Lab Results   Component Value Date/Time    NITRU Negative 01/07/2023 09:38 PM    WBCUA None seen 01/07/2023 09:38 PM    BACTERIA 2+ 01/31/2012 08:33 AM    RBCUA 3-4 01/07/2023 09:38 PM    BLOODU TRACE-INTACT 01/07/2023 09:38 PM    SPECGRAV 1.010 01/07/2023 09:38 PM    GLUCOSEU Negative 01/07/2023 09:38 PM    GLUCOSEU NEGATIVE 01/31/2012 08:33 AM       Radiology:  CT CHEST PULMONARY EMBOLISM W CONTRAST   Final Result   Motion artifact limiting the exam along the lung bases. Within the   limitations of the exam, no obvious acute pulmonary embolus can be seen. Moderate bibasilar atelectasis and/or infiltrates versus scarring and   associated tiny bibasilar pleural effusions which is more prominent on the   left. Recommend follow-up with serial chest x-rays. Mildly dilated atherosclerotic thoracic aorta with no aneurysm or dissection. Extensive coronary artery calcifications. 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 Previous gastric bypass surgery which is unchanged with no bowel obstruction      Moderate diverticulosis throughout the left colon with no pericolonic   inflammation. CT ABDOMEN PELVIS W IV CONTRAST Additional Contrast? None   Final Result   Motion artifact limiting the exam along the lung bases. Within the   limitations of the exam, no obvious acute pulmonary embolus can be seen. Moderate bibasilar atelectasis and/or infiltrates versus scarring and   associated tiny bibasilar pleural effusions which is more prominent on the   left. Recommend follow-up with serial chest x-rays. Mildly dilated atherosclerotic thoracic aorta with no aneurysm or dissection. Extensive coronary artery calcifications.   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      Previous gastric bypass surgery which is unchanged with no bowel obstruction      Moderate diverticulosis throughout the left colon with no pericolonic   inflammation. XR CHEST (2 VW)   Final Result   No acute process. 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 Target goal SpO2 = 90-94%. Currently patient is requiring 2 L/min O2 support. At baseline he does not require supplemental O2.  -  Start solumedrol 40mg IV q12h, scheduled duonebs, prn albuterol nebs, and prn antitussives. Continue home montelukast.  -  Oseltamivir 75mg once then 30mg BID planned for a five day course. -  Monitor for signs of an evolving secondary bacterial pneumonia. Low suspicion at the time of admission.  -  Cultrures:  blood, sputum (if any), MRSA DNA nasal probe     CKD 3a  -  Baseline SCr ~ 1.4 and currently 1.4. Patient appears nearly euvolemic.  -  Losartan held while acutely ill out of caution. CAD s/p CABG (2012)  -  TTE 7/30/2021:  LVEF = 50-55%. Moderate RVH and RV systolic dysfunction. Otherwise only mild abnormalities. -  Continue home ASA, statin, metoprolol. DM2  -  Hold all oral antidiabetic agents. Start s.c. Insulin regimen based on weight. H/O Upper GI Bleeding  -  Continue BID PPI. DVT Prophylaxis: Lovenox  Diet: ADULT DIET; Regular; 3 carb choices (45 gm/meal);  Low Fat/Low Chol/High Fiber/ZAHEER  Code Status: Full Code  PT/OT Eval Status: Pending    Dispo - 1-2 days pending respiratory status     Appropriate for A1 Discharge Unit: No      BEAU Gooden

## 2023-01-08 NOTE — RT PROTOCOL NOTE
RT Inhaler-Nebulizer Bronchodilator Protocol Note    There is a bronchodilator order in the chart from a provider indicating to follow the RT Bronchodilator Protocol and there is an Initiate RT Inhaler-Nebulizer Bronchodilator Protocol order as well (see protocol at bottom of note). CXR Findings:  No results found. The findings from the last RT Protocol Assessment were as follows:   History Pulmonary Disease: Chronic pulmonary disease  Respiratory Pattern: Mild dyspnea at rest, irregular pattern, or RR 21-25 bpm  Breath Sounds: Intermittent or unilateral wheezes  Cough: Strong, productive  Indication for Bronchodilator Therapy: On home bronchodilators  Bronchodilator Assessment Score: 11    Aerosolized bronchodilator medication orders have been revised according to the RT Inhaler-Nebulizer Bronchodilator Protocol below. Respiratory Therapist to perform RT Therapy Protocol Assessment initially then follow the protocol. Repeat RT Therapy Protocol Assessment PRN for score 0-3 or on second treatment, BID, and PRN for scores above 3. No Indications - adjust the frequency to every 6 hours PRN wheezing or bronchospasm, if no treatments needed after 48 hours then discontinue using Per Protocol order mode. If indication present, adjust the RT bronchodilator orders based on the Bronchodilator Assessment Score as indicated below. Use Inhaler orders unless patient has one or more of the following: on home nebulizer, not able to hold breath for 10 seconds, is not alert and oriented, cannot activate and use MDI correctly, or respiratory rate 25 breaths per minute or more, then use the equivalent nebulizer order(s) with same Frequency and PRN reasons based on the score. If a patient is on this medication at home then do not decrease Frequency below that used at home.     0-3 - enter or revise RT bronchodilator order(s) to equivalent RT Bronchodilator order with Frequency of every 4 hours PRN for wheezing or increased work of breathing using Per Protocol order mode. 4-6 - enter or revise RT Bronchodilator order(s) to two equivalent RT bronchodilator orders with one order with BID Frequency and one order with Frequency of every 4 hours PRN wheezing or increased work of breathing using Per Protocol order mode. 7-10 - enter or revise RT Bronchodilator order(s) to two equivalent RT bronchodilator orders with one order with TID Frequency and one order with Frequency of every 4 hours PRN wheezing or increased work of breathing using Per Protocol order mode. 11-13 - enter or revise RT Bronchodilator order(s) to one equivalent RT bronchodilator order with QID Frequency and an Albuterol order with Frequency of every 4 hours PRN wheezing or increased work of breathing using Per Protocol order mode. Greater than 13 - enter or revise RT Bronchodilator order(s) to one equivalent RT bronchodilator order with every 4 hours Frequency and an Albuterol order with Frequency of every 2 hours PRN wheezing or increased work of breathing using Per Protocol order mode. RT to enter RT Home Evaluation for COPD & MDI Assessment order using Per Protocol order mode.     Electronically signed by Donaldo Davidson RCP on 1/7/2023 at 10:42 PM

## 2023-01-08 NOTE — ED PROVIDER NOTES
[facility]  ED  EMERGENCY DEPARTMENT ENCOUNTER        Pt Name: Dimas Jerome  MRN: [id number]  [place] 1940  Date of evaluation: 1/7/2023  Provider: Dee Dee Mancia PA-C  PCP: Starr PONCE  Note Started: 7:16 PM EST 1/7/23       I have seen and evaluated this patient with my supervising physician Robbie David MD    I personally saw the patient and independently provided 33 minutes of non-concurrent critical care time out of the total critical care time provided. This excludes time spent doing separately billable procedures. This includes time at the bedside, data interpretation, medication management, obtaining critical history from collateral sources if the patient is unable to provide it directly, and physician consultation. Specifics of interventions taken and potentially life-threatening diagnostic considerations are listed above in the medical decision making. CHIEF COMPLAINT       Chief Complaint   Patient presents with    Other     Per EMS patient's family called for \"not acting right\" patient alert and oriented x4 at triage and has no acute complaints other than still having abd pain and diarrhea from evaluation here on 1/3/23       HISTORY OF PRESENT ILLNESS: 1 or more Elements     History From: patient  Limitations to history : None    Dimas Jerome is a 81 y.o. male who presents to the emergency department with a chief complaint of just generally not feeling well and abdominal pain. Was seen here 4 days ago for abdominal pain and had CT imaging that was unremarkable work-up that was unremarkable. Family was concerned as they were concerned he was not acting right. Patient presented hypoxic in the [de-identified]. Currently on nasal cannula oxygen. Patient was really unable to tell me if he is on oxygen or not. He does have history of COPD. He denies chest pain. States he does feel slightly short of breath. States he recently had influenza.   Denies diarrhea, bloody stool, dysuria, hematuria or other symptoms. He is alert and oriented x3 but slower to answer questions. Nursing Notes were all reviewed and agreed with or any disagreements were addressed in the HPI. REVIEW OF SYSTEMS :      Review of Systems    Positives and Pertinent negatives as per HPI. SURGICAL HISTORY     Past Surgical History:   Procedure Laterality Date    COLECTOMY  2003    COLONOSCOPY  5/11    diverticulosis    COLONOSCOPY  5/6/2016    Colon Ulcer    COLONOSCOPY N/A 6/21/2020    COLONOSCOPY DIAGNOSTIC performed by Karen Carpenter MD at [facility]  2007    3 VESSEL    COSMETIC SURGERY      removed skin from stomach    FOOT SURGERY Bilateral     toenails removed    GASTRIC BYPASS SURGERY  2001    HERNIA REPAIR  2085    umbilical    NASAL SEPTUM SURGERY  1995    SHOULDER ARTHROSCOPY Right 9/8/15    rtc repair    [address]    vocal cord     UPPER GASTROINTESTINAL ENDOSCOPY N/A 6/22/2020    EGD BIOPSY performed by Karen Carpenter MD at [address] 8/19/2021    EGD ESOPHAGOGASTRODUODENOSCOPY performed by Karen Carpenter MD at [address]       Previous Medications    ALBUTEROL (PROVENTIL HFA;VENTOLIN HFA) 108 (90 BASE) MCG/ACT INHALER    Inhale 2 puffs into the lungs every 6 hours as needed. ALBUTEROL (PROVENTIL) (2.5 MG/3ML) 0.083% NEBULIZER SOLUTION    Take 2.5 mg by nebulization every 6 hours as needed for Wheezing    ASCORBIC ACID (VITAMIN C) 500 MG TABLET    Take 500 mg by mouth daily    ASPIRIN EC 81 MG EC TABLET    Take 1 tablet by mouth daily    BIOTIN 5000 MCG TABS    Take by mouth    CHOLECALCIFEROL (VITAMIN D-3 PO)    Take by mouth    COENZYME Q10 (COQ10 PO)    Take by mouth    CYANOCOBALAMIN (VITAMIN B-12 CR PO)    Take by mouth daily    IRON CR PO    Take by mouth daily    LEVOTHYROXINE (SYNTHROID) 100 MCG TABLET    Take 50 mcg by mouth daily. LOSARTAN (COZAAR) 25 MG TABLET    Take 25 mg by mouth daily    LOVASTATIN (MEVACOR) 20 MG TABLET    Take 20 mg by mouth nightly    METOPROLOL (LOPRESSOR) 50 MG TABLET    Take 50 mg by mouth 2 times daily. MONTELUKAST (SINGULAIR) 10 MG TABLET    Take 10 mg by mouth nightly. MULTIPLE VITAMINS-MINERALS (THERAPEUTIC MULTIVITAMIN-MINERALS) TABLET    Take 1 tablet by mouth daily    NITROGLYCERIN (NITROSTAT) 0.4 MG SL TABLET    Place 0.4 mg under the tongue every 5 minutes as needed. PANTOPRAZOLE (PROTONIX) 40 MG TABLET    Take 1 tablet by mouth 2 times daily (before meals)       ALLERGIES     Seasonal and Clindamycin hcl    FAMILYHISTORY       Family History   Problem Relation Age of Onset    Cancer Maternal Grandmother         SOCIAL HISTORY       Social History     Tobacco Use    Smoking status: Former     Packs/day: 1.00     Years: 20.00     Pack years: 20.00     Types: Cigarettes    Smokeless tobacco: Never    Tobacco comments:     plans on quitting 9/2016 when he gets a total knee   Substance Use Topics    Alcohol use: No     Comment: pt poor historian       SCREENINGS        Osman Coma Scale  Eye Opening: Spontaneous  Best Verbal Response: Oriented  Best Motor Response: Obeys commands  Osman Coma Scale Score: 15                CIWA Assessment  BP: 138/74  Heart Rate: 86           PHYSICAL EXAM  1 or more Elements     ED Triage Vitals   BP Temp Temp Source Heart Rate Resp SpO2 Height Weight   01/07/23 1723 01/07/23 1727 01/07/23 1727 01/07/23 1723 01/07/23 1723 01/07/23 1723 01/07/23 1723 01/07/23 1723   (!) 113/93 99.8 °F (37.7 °C) Oral 98 18 90 % 5' 8\" (1.727 m) 240 lb (108.9 kg)       Physical Exam  Vitals and nursing note reviewed. Constitutional:       Appearance: He is well-developed. He is not diaphoretic. Comments: Chronically ill-appearing   HENT:      Head: Atraumatic. Nose: Nose normal.   Eyes:      General:         Right eye: No discharge. Left eye: No discharge. Cardiovascular:      Rate and Rhythm: Normal rate and regular rhythm. Heart sounds: No murmur heard. No friction rub. No gallop. Pulmonary:      Effort: Pulmonary effort is normal. No respiratory distress. Breath sounds: No stridor. Rhonchi present. No rales. Comments: Decreased breath sounds with some possible rhonchi appreciated at the bases. No retractions or accessory muscle use  Abdominal:      General: Bowel sounds are normal. There is no distension. Palpations: Abdomen is soft. There is no mass. Tenderness: There is abdominal tenderness. There is no guarding or rebound. Hernia: No hernia is present. Comments: Generalized subjective tenderness to palpate without guarding, rebound or rigidity. Large postop incision scar noted in the abdomen. There is a small wound in the lower abdomen without any erythema or drainage. Musculoskeletal:         General: No swelling. Normal range of motion. Cervical back: Normal range of motion. Skin:     General: Skin is warm and dry. Findings: No erythema or rash. Neurological:      Mental Status: He is alert and oriented to person, place, and time. Cranial Nerves: No cranial nerve deficit.    Psychiatric:         Behavior: Behavior normal.           DIAGNOSTIC RESULTS   LABS:    Labs Reviewed   COVID-19 & INFLUENZA COMBO - Abnormal; Notable for the following components:       Result Value    INFLUENZA A DETECTED (*)     All other components within normal limits   CBC WITH AUTO DIFFERENTIAL - Abnormal; Notable for the following components:    Hematocrit 40.4 (*)     Platelets 932 (*)     Lymphocytes Absolute 0.5 (*)     All other components within normal limits   COMPREHENSIVE METABOLIC PANEL - Abnormal; Notable for the following components:    Sodium 129 (*)     Chloride 94 (*)     Glucose 148 (*)     BUN 22 (*)     Creatinine 1.4 (*)     Est, Glom Filt Rate 50 (*)     Albumin/Globulin Ratio 0.9 (*)     Alkaline Phosphatase 143 (*)     AST 54 (*)     All other components within normal limits   URINALYSIS WITH REFLEX TO CULTURE - Abnormal; Notable for the following components:    Ketones, Urine 15 (*)     Blood, Urine TRACE-INTACT (*)     Protein, UA TRACE (*)     All other components within normal limits   TROPONIN - Abnormal; Notable for the following components:    Troponin 0.02 (*)     All other components within normal limits   PROTIME-INR - Abnormal; Notable for the following components:    Protime 15.3 (*)     INR 1.22 (*)     All other components within normal limits   CULTURE, BLOOD 1   CULTURE, BLOOD 2   CULTURE, RESPIRATORY   MRSA DNA PROBE, NASAL   LACTATE, SEPSIS   MAGNESIUM   PROCALCITONIN   MICROSCOPIC URINALYSIS       When ordered only abnormal lab results are displayed. All other labs were within normal range or not returned as of this dictation. EKG: When ordered, EKG's are interpreted by the Emergency Department Physician in the absence of a cardiologist.  Please see their note for interpretation of EKG. RADIOLOGY:   Non-plain film images such as CT, Ultrasound and MRI are read by the radiologist. Plain radiographic images are visualized and preliminarily interpreted by the ED Provider with the below findings:        Interpretation per the Radiologist below, if available at the time of this note:    CT CHEST PULMONARY EMBOLISM W CONTRAST   Final Result   Motion artifact limiting the exam along the lung bases. Within the   limitations of the exam, no obvious acute pulmonary embolus can be seen. Moderate bibasilar atelectasis and/or infiltrates versus scarring and   associated tiny bibasilar pleural effusions which is more prominent on the   left. Recommend follow-up with serial chest x-rays. Mildly dilated atherosclerotic thoracic aorta with no aneurysm or dissection. Extensive coronary artery calcifications.   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 Previous gastric bypass surgery which is unchanged with no bowel obstruction      Moderate diverticulosis throughout the left colon with no pericolonic   inflammation. CT ABDOMEN PELVIS W IV CONTRAST Additional Contrast? None   Final Result   Motion artifact limiting the exam along the lung bases. Within the   limitations of the exam, no obvious acute pulmonary embolus can be seen. Moderate bibasilar atelectasis and/or infiltrates versus scarring and   associated tiny bibasilar pleural effusions which is more prominent on the   left. Recommend follow-up with serial chest x-rays. Mildly dilated atherosclerotic thoracic aorta with no aneurysm or dissection. Extensive coronary artery calcifications. 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 Previous gastric bypass surgery which is unchanged with no bowel obstruction      Moderate diverticulosis throughout the left colon with no pericolonic   inflammation. XR CHEST (2 VW)   Final Result   No acute process. Bibasilar hypoaeration           XR CHEST (2 VW)    Result Date: 1/7/2023  EXAMINATION: TWO XRAY VIEWS OF THE CHEST 1/7/2023 6:02 pm COMPARISON: 07/09/2021 HISTORY: ORDERING SYSTEM PROVIDED HISTORY: Hypoxia, hx of COPD TECHNOLOGIST PROVIDED HISTORY: Reason for exam:->Hypoxia, hx of COPD Reason for Exam: hypoxia, hx of COPD FINDINGS: Status post median sternotomy. The lungs are without acute focal process. Bibasilar hypoaeration. There is no effusion or pneumothorax. The cardiomediastinal silhouette is stable. The osseous structures are stable. No acute process.  Bibasilar hypoaeration     CT ABDOMEN PELVIS W IV CONTRAST Additional Contrast? None    Result Date: 1/3/2023  EXAMINATION: CT OF THE ABDOMEN AND PELVIS WITH CONTRAST 1/3/2023 1:45 pm TECHNIQUE: CT of the abdomen and pelvis was performed with the administration of intravenous contrast. Multiplanar reformatted images are provided for review. Automated exposure control, iterative reconstruction, and/or weight based adjustment of the mA/kV was utilized to reduce the radiation dose to as low as reasonably achievable. COMPARISON: 05/02/2011 HISTORY: ORDERING SYSTEM PROVIDED HISTORY: epigastric abd pain TECHNOLOGIST PROVIDED HISTORY: Additional Contrast?->None Reason for exam:->epigastric abd pain Decision Support Exception - unselect if not a suspected or confirmed emergency medical condition->Emergency Medical Condition (MA) Reason for Exam: epigastric pain x 2 days-no appetite Relevant Medical/Surgical History: bariatric surg-umbilical hernia repair FINDINGS: Lower Chest: Mild bibasilar bronchiectatic changes are noted along with mild chronic pleuroparenchymal scarring. The pleural spaces are clear. Organs: The liver, pancreas, spleen, kidneys and adrenals are unremarkable. The gallbladder is surgically absent. GI/Bowel: There is no bowel dilatation, wall thickening or obstruction. Diverticular changes are scattered throughout the left hemicolon. Postop changes of gastric bypass are noted. Pelvis: The bladder and prostate are unremarkable. Peritoneum/Retroperitoneum: There is no free air, free fluid or intraperitoneal inflammatory change. There is no adenopathy. Postop changes of ventral abdominal hernia repair are present. Bones/Soft Tissues: There is no acute fracture or aggressive osseous lesion. Diverticulosis. No results found. PROCEDURES   Unless otherwise noted below, none     Procedures    CRITICAL CARE TIME (.cctime)       PAST MEDICAL HISTORY      has a past medical history of Arthritis, Asthma, CAD (coronary artery disease), Gastric bypass status for obesity, Hyperlipidemia, Hypertension, Low kidney function, Osteoarthritis, and Thyroid disease. EMERGENCY DEPARTMENT COURSE and DIFFERENTIAL DIAGNOSIS/MDM:   Vitals:    Vitals:    01/07/23 2131 01/07/23 2148 01/07/23 2159 01/07/23 2226   BP: 135/76 (!) 148/85 138/74    Pulse: 89 96 86 86   Resp: 21 23 25 22   Temp:  98.5 °F (36.9 °C)     TempSrc:  Oral     SpO2: 93% 96% 93% 94%   Weight:       Height:           Patient was given the following medications:  Medications   oseltamivir (TAMIFLU) capsule 30 mg (has no administration in time range)   ipratropium-albuterol (DUONEB) nebulizer solution 1 ampule (1 ampule Inhalation Given 1/7/23 2226)   albuterol (PROVENTIL) nebulizer solution 2.5 mg (has no administration in time range)   methylPREDNISolone sodium (SOLU-MEDROL) injection 40 mg (40 mg IntraVENous Given 1/7/23 2200)   aluminum & magnesium hydroxide-simethicone (MAALOX) 200-200-20 MG/5ML suspension 30 mL (has no administration in time range)   melatonin tablet 3 mg (has no administration in time range)   calcium carbonate (TUMS) chewable tablet 1,000 mg (has no administration in time range)   lactated ringers bolus (0 mLs IntraVENous Stopped 1/7/23 1957)   iopamidol (ISOVUE-370) 76 % injection 75 mL (75 mLs IntraVENous Given 1/7/23 1942)   oseltamivir (TAMIFLU) capsule 75 mg (75 mg Oral Given 1/7/23 2158)             Is this patient to be included in the SEP-1 Core Measure due to severe sepsis or septic shock? No   Exclusion criteria - the patient is NOT to be included for SEP-1 Core Measure due to:  Viral etiology found or highly suspected (including COVID-19) without concomitant bacterial infection    Chronic Conditions affecting care:    has a past medical history of Arthritis, Asthma, CAD (coronary artery disease), Gastric bypass status for obesity, Hyperlipidemia, Hypertension, Low kidney function, Osteoarthritis, and Thyroid disease.     CONSULTS: (Who and What was discussed)  Katiuska Anthony HOSPITALIST  Dr. Mihir Alexandre    Social Determinants : None    Records Reviewed (Source):     CC/HPI Summary, DDx, ED Course, and Reassessment: Patient presents with general weakness, abdominal pain and shortness of breath. He was satting in the 80s on room air when he got here. On further questioning patient actually has a nebulizer at home and does not wear oxygen. He was found to be influenza A positive. Has mildly elevated troponin at 0.02 but does have some baseline CKD at 1.4. Has slightly worsening sodium from the other day at 129 today. Was 134 the other day. Was started on some IV fluids as he does appear dry. Does not have a leukocytosis. CT imaging the chest and abdomen was performed to reveals bibasilar atelectasis or infiltrates. Concern is for possible pneumonia with influenza and given his hypoxia will start on Rocephin and Zithromax and blood cultures are obtained. Could just be viral pneumonia also. Procalcitonin will be added on. Discussed with hospitalist will admit for further work-up and treatment. Patient was stable time of admission    Disposition Considerations (tests considered but not done, Admit vs D/C, Shared Decision Making, Pt Expectation of Test or Tx.):        I am the Primary Clinician of Record. FINAL IMPRESSION      1. Hypoxia    2. Pneumonia due to infectious organism, unspecified laterality, unspecified part of lung    3. Influenza A    4. Hyponatremia    5. Generalized abdominal pain          DISPOSITION/PLAN     DISPOSITION Admitted 01/07/2023 10:07:38 PM      PATIENT REFERRED TO:  No follow-up provider specified.     DISCHARGE MEDICATIONS:  New Prescriptions    No medications on file       DISCONTINUED MEDICATIONS:  Discontinued Medications    No medications on file              (Please note that portions of this note were completed with a voice recognition program.  Efforts were made to edit the dictations but occasionally words are mis-transcribed.)    Raul Metzger PA-C (electronically signed)       Raul Metzger PA-C  01/07/23 [id number]

## 2023-01-09 VITALS
BODY MASS INDEX: 35.33 KG/M2 | HEIGHT: 68 IN | DIASTOLIC BLOOD PRESSURE: 74 MMHG | WEIGHT: 233.1 LBS | TEMPERATURE: 98 F | RESPIRATION RATE: 16 BRPM | SYSTOLIC BLOOD PRESSURE: 134 MMHG | HEART RATE: 77 BPM | OXYGEN SATURATION: 92 %

## 2023-01-09 LAB
A/G RATIO: 0.8 (ref 1.1–2.2)
ALBUMIN SERPL-MCNC: 2.8 G/DL (ref 3.4–5)
ALP BLD-CCNC: 112 U/L (ref 40–129)
ALT SERPL-CCNC: 38 U/L (ref 10–40)
ANION GAP SERPL CALCULATED.3IONS-SCNC: 12 MMOL/L (ref 3–16)
AST SERPL-CCNC: 63 U/L (ref 15–37)
BASOPHILS ABSOLUTE: 0 K/UL (ref 0–0.2)
BASOPHILS RELATIVE PERCENT: 0 %
BILIRUB SERPL-MCNC: 0.4 MG/DL (ref 0–1)
BUN BLDV-MCNC: 36 MG/DL (ref 7–20)
CALCIUM SERPL-MCNC: 8.4 MG/DL (ref 8.3–10.6)
CHLORIDE BLD-SCNC: 96 MMOL/L (ref 99–110)
CO2: 22 MMOL/L (ref 21–32)
CREAT SERPL-MCNC: 1.5 MG/DL (ref 0.8–1.3)
EOSINOPHILS ABSOLUTE: 0 K/UL (ref 0–0.6)
EOSINOPHILS RELATIVE PERCENT: 0 %
ESTIMATED AVERAGE GLUCOSE: 125.5 MG/DL
GFR SERPL CREATININE-BSD FRML MDRD: 46 ML/MIN/{1.73_M2}
GLUCOSE BLD-MCNC: 179 MG/DL (ref 70–99)
GLUCOSE BLD-MCNC: 189 MG/DL (ref 70–99)
GLUCOSE BLD-MCNC: 212 MG/DL (ref 70–99)
GLUCOSE BLD-MCNC: 225 MG/DL (ref 70–99)
GLUCOSE BLD-MCNC: 236 MG/DL (ref 70–99)
HBA1C MFR BLD: 6 %
HCT VFR BLD CALC: 37.4 % (ref 40.5–52.5)
HEMOGLOBIN: 12.6 G/DL (ref 13.5–17.5)
INR BLD: 1.18 (ref 0.87–1.14)
LYMPHOCYTES ABSOLUTE: 0.3 K/UL (ref 1–5.1)
LYMPHOCYTES RELATIVE PERCENT: 4.3 %
MAGNESIUM: 2.4 MG/DL (ref 1.8–2.4)
MCH RBC QN AUTO: 31.3 PG (ref 26–34)
MCHC RBC AUTO-ENTMCNC: 33.6 G/DL (ref 31–36)
MCV RBC AUTO: 93.2 FL (ref 80–100)
MONOCYTES ABSOLUTE: 0.1 K/UL (ref 0–1.3)
MONOCYTES RELATIVE PERCENT: 2.2 %
MRSA SCREEN RT-PCR: NORMAL
NEUTROPHILS ABSOLUTE: 6.1 K/UL (ref 1.7–7.7)
NEUTROPHILS RELATIVE PERCENT: 93.5 %
PDW BLD-RTO: 13.8 % (ref 12.4–15.4)
PERFORMED ON: ABNORMAL
PLATELET # BLD: 144 K/UL (ref 135–450)
PMV BLD AUTO: 8.4 FL (ref 5–10.5)
POTASSIUM SERPL-SCNC: 4.1 MMOL/L (ref 3.5–5.1)
PROTHROMBIN TIME: 14.9 SEC (ref 11.7–14.5)
RBC # BLD: 4.01 M/UL (ref 4.2–5.9)
SODIUM BLD-SCNC: 130 MMOL/L (ref 136–145)
TOTAL PROTEIN: 6.2 G/DL (ref 6.4–8.2)
WBC # BLD: 6.6 K/UL (ref 4–11)

## 2023-01-09 PROCEDURE — 6370000000 HC RX 637 (ALT 250 FOR IP): Performed by: INTERNAL MEDICINE

## 2023-01-09 PROCEDURE — 85610 PROTHROMBIN TIME: CPT

## 2023-01-09 PROCEDURE — 83735 ASSAY OF MAGNESIUM: CPT

## 2023-01-09 PROCEDURE — 97530 THERAPEUTIC ACTIVITIES: CPT

## 2023-01-09 PROCEDURE — 80053 COMPREHEN METABOLIC PANEL: CPT

## 2023-01-09 PROCEDURE — 6360000002 HC RX W HCPCS: Performed by: INTERNAL MEDICINE

## 2023-01-09 PROCEDURE — 97165 OT EVAL LOW COMPLEX 30 MIN: CPT

## 2023-01-09 PROCEDURE — 2580000003 HC RX 258: Performed by: INTERNAL MEDICINE

## 2023-01-09 PROCEDURE — 6370000000 HC RX 637 (ALT 250 FOR IP): Performed by: PHYSICIAN ASSISTANT

## 2023-01-09 PROCEDURE — 36415 COLL VENOUS BLD VENIPUNCTURE: CPT

## 2023-01-09 PROCEDURE — 85025 COMPLETE CBC W/AUTO DIFF WBC: CPT

## 2023-01-09 PROCEDURE — 97535 SELF CARE MNGMENT TRAINING: CPT

## 2023-01-09 PROCEDURE — 94640 AIRWAY INHALATION TREATMENT: CPT

## 2023-01-09 RX ORDER — SODIUM CHLORIDE 9 MG/ML
INJECTION, SOLUTION INTRAVENOUS CONTINUOUS
Status: DISCONTINUED | OUTPATIENT
Start: 2023-01-09 | End: 2023-01-09

## 2023-01-09 RX ORDER — PREDNISONE 20 MG/1
40 TABLET ORAL DAILY
Qty: 6 TABLET | Refills: 0 | Status: SHIPPED | OUTPATIENT
Start: 2023-01-09 | End: 2023-01-12

## 2023-01-09 RX ORDER — OSELTAMIVIR PHOSPHATE 30 MG/1
30 CAPSULE ORAL 2 TIMES DAILY
Qty: 6 CAPSULE | Refills: 0 | Status: SHIPPED | OUTPATIENT
Start: 2023-01-09 | End: 2023-01-12

## 2023-01-09 RX ORDER — ALBUTEROL SULFATE 90 UG/1
2 AEROSOL, METERED RESPIRATORY (INHALATION) EVERY 4 HOURS PRN
Qty: 18 G | Refills: 3 | Status: SHIPPED | OUTPATIENT
Start: 2023-01-09

## 2023-01-09 RX ORDER — PREDNISONE 20 MG/1
40 TABLET ORAL DAILY
Status: DISCONTINUED | OUTPATIENT
Start: 2023-01-09 | End: 2023-01-09 | Stop reason: HOSPADM

## 2023-01-09 RX ADMIN — METOPROLOL TARTRATE 50 MG: 50 TABLET, FILM COATED ORAL at 09:22

## 2023-01-09 RX ADMIN — DICYCLOMINE HYDROCHLORIDE 20 MG: 20 TABLET ORAL at 10:59

## 2023-01-09 RX ADMIN — IPRATROPIUM BROMIDE AND ALBUTEROL SULFATE 1 AMPULE: .5; 2.5 SOLUTION RESPIRATORY (INHALATION) at 09:00

## 2023-01-09 RX ADMIN — ENOXAPARIN SODIUM 30 MG: 100 INJECTION SUBCUTANEOUS at 09:23

## 2023-01-09 RX ADMIN — LEVOTHYROXINE SODIUM 88 MCG: 88 TABLET ORAL at 05:09

## 2023-01-09 RX ADMIN — PANTOPRAZOLE SODIUM 40 MG: 40 TABLET, DELAYED RELEASE ORAL at 05:09

## 2023-01-09 RX ADMIN — Medication 1000 UNITS: at 09:21

## 2023-01-09 RX ADMIN — ATORVASTATIN CALCIUM 10 MG: 10 TABLET, FILM COATED ORAL at 09:21

## 2023-01-09 RX ADMIN — METHYLPREDNISOLONE SODIUM SUCCINATE 40 MG: 40 INJECTION, POWDER, FOR SOLUTION INTRAMUSCULAR; INTRAVENOUS at 09:23

## 2023-01-09 RX ADMIN — IPRATROPIUM BROMIDE AND ALBUTEROL SULFATE 1 AMPULE: .5; 2.5 SOLUTION RESPIRATORY (INHALATION) at 12:10

## 2023-01-09 RX ADMIN — INSULIN LISPRO 5 UNITS: 100 INJECTION, SOLUTION INTRAVENOUS; SUBCUTANEOUS at 12:44

## 2023-01-09 RX ADMIN — INSULIN LISPRO 5 UNITS: 100 INJECTION, SOLUTION INTRAVENOUS; SUBCUTANEOUS at 09:23

## 2023-01-09 RX ADMIN — PREDNISONE 40 MG: 20 TABLET ORAL at 15:19

## 2023-01-09 RX ADMIN — IPRATROPIUM BROMIDE AND ALBUTEROL SULFATE 1 AMPULE: .5; 2.5 SOLUTION RESPIRATORY (INHALATION) at 15:51

## 2023-01-09 RX ADMIN — PANTOPRAZOLE SODIUM 40 MG: 40 TABLET, DELAYED RELEASE ORAL at 15:19

## 2023-01-09 RX ADMIN — SODIUM CHLORIDE: 9 INJECTION, SOLUTION INTRAVENOUS at 09:30

## 2023-01-09 RX ADMIN — DICYCLOMINE HYDROCHLORIDE 20 MG: 20 TABLET ORAL at 05:09

## 2023-01-09 RX ADMIN — INSULIN LISPRO 1 UNITS: 100 INJECTION, SOLUTION INTRAVENOUS; SUBCUTANEOUS at 09:24

## 2023-01-09 RX ADMIN — DICYCLOMINE HYDROCHLORIDE 20 MG: 20 TABLET ORAL at 15:19

## 2023-01-09 RX ADMIN — OSELTAMIVIR PHOSPHATE 30 MG: 30 CAPSULE ORAL at 09:21

## 2023-01-09 RX ADMIN — ASPIRIN 81 MG: 81 TABLET, COATED ORAL at 09:21

## 2023-01-09 ASSESSMENT — PAIN SCALES - GENERAL: PAINLEVEL_OUTOF10: 7

## 2023-01-09 NOTE — PROGRESS NOTES
Physical Therapy    Per OT and RN patient does not require physical therapy services at this time. Patient is functioning at baseline and is able to walk 40ft to the bathroom. PT will sign off at this time. Thank you.     Tamiko Lewis PT, DPT

## 2023-01-09 NOTE — PROGRESS NOTES
Pt a/o. Pt stated no pain, no nausea, no SOB. No emesis. Appetite good. Taking in PO fluids. Pt very eager to go home. Ambulates as stand by and up to the chair all morning/afternoon. PIV removed. Discharge instructions given to pt and all questions answered. Pt has home oxygen, belongings, and discharge instructions.

## 2023-01-09 NOTE — PROGRESS NOTES
Occupational Therapy  Facility/Department: [facility] C3 TELE/MED SURG/ONC  Occupational Therapy Initial Assessment/ Treatment/Discharge  1x only    Name: Helio Estrada  : 1940  MRN: [id number]  Date of Service: 2023    Discharge Recommendations:  Home with assist PRN  OT Equipment Recommendations  Equipment Needed: No       Patient Diagnosis(es): The primary encounter diagnosis was Hypoxia. Diagnoses of Pneumonia due to infectious organism, unspecified laterality, unspecified part of lung, Influenza A, Hyponatremia, and Generalized abdominal pain were also pertinent to this visit. Past Medical History:  has a past medical history of Arthritis, Asthma, CAD (coronary artery disease), Gastric bypass status for obesity, Hyperlipidemia, Hypertension, Low kidney function, Osteoarthritis, and Thyroid disease. Past Surgical History:  has a past surgical history that includes Coronary artery bypass graft (); Gastric bypass surgery (); Foot surgery (Bilateral); Nasal septum surgery (); Throat surgery (); Cosmetic surgery; colectomy (); hernia repair (); Shoulder arthroscopy (Right, 9/8/15); Colonoscopy (); Colonoscopy (2016); Colonoscopy (N/A, 2020); Upper gastrointestinal endoscopy (N/A, 2020); and Upper gastrointestinal endoscopy (N/A, 2021). Treatment Diagnosis: generalized weakness      Assessment   Assessment: Pt is an 80 yo male admitted to [facility] with a dx of influenza A. Pt reporting PTA living with his spouse in a 2 story home with 1 CHANCE and IND with I/ADLs and mobility without AD. Currently pt is presenting at baseline for toileting, UB ADLs, LB ADLs, and functional mobility without AD. Skilled OT services are not warranted at this time. Recommend pt return home with PRN assist once medically stable. OT signing off.   Treatment Diagnosis: generalized weakness  Prognosis: Good  Decision Making: Low Complexity  REQUIRES OT FOLLOW-UP: No  Activity Tolerance  Activity Tolerance: Patient Tolerated treatment well  Activity Tolerance Comments: O2 88% after mobility, able to recover to 92% with seated rest break after ~1 min.  Pt reporting \"I typically get a little short of breath after walking short distance because of my COPD\"        Plan   Occupational Therapy Plan  Times Per Week: 1x only     Restrictions  Restrictions/Precautions  Restrictions/Precautions: General Precautions, Isolation  Position Activity Restriction  Other position/activity restrictions: Droplet, tele    Subjective   General  Patient assessed for rehabilitation services?: Yes     Social/Functional History  Social/Functional History  Lives With: Spouse, Son  Type of Home: House  Home Layout: Two level, Able to Live on Main level with bedroom/bathroom, Work area in basement  Home Access: Stairs to enter without rails  Entrance Stairs - Number of Steps: 1 CHANCE  Bathroom Shower/Tub: Walk-in shower  Bathroom Toilet: Handicap height  Bathroom Equipment: Grab bars in shower, Grab bars around toilet, Hand-held shower  Bathroom Accessibility: Not accessible  Home Equipment: Rollator, [place], Walker, standard (does not use AD)  Has the patient had two or more falls in the past year or any fall with injury in the past year?: Yes (1 fall in early 2022 resulting in concussion and broken big toe)  ADL Assistance: Independent  Homemaking Assistance: Independent  Meal Prep Responsibility: Primary  Laundry Responsibility: Primary  Cleaning Responsibility: Primary  Shopping Responsibility: Primary  Health Care Management: Primary  Ambulation Assistance: Independent  Transfer Assistance: Independent  Active : Yes  Mode of Transportation: Truck  Occupation: Retired  Type of Occupation: allied product   Leisure & Hobbies: build motors for drag races  Additional Comments: Wife able to provided 24H supv/assist       Objective   Heart Rate: 90  Heart Rate Source: Monitor  BP: 131/85  BP Location: Right upper arm  BP Method: Automatic  Patient Position: Up in chair  MAP (Calculated): 100  Resp: 16  SpO2: 91 %  O2 Device: None (Room air)  Comment: Destats to 88% after mobility, able to recover to 92% within a minute          Observation/Palpation  Posture: Fair  Safety Devices  Type of Devices: Nurse notified; Left in chair;Call light within reach;Gait belt (RN ([facility]) reporting pt okay to be up in chair without alarm)  Restraints  Restraints Initially in Place: No  Bed Mobility Training  Bed Mobility Training: No (pt seated in chair upon entry and at EOS)  Balance  Sitting: Intact  Standing: Intact  Transfer Training  Transfer Training: Yes  Overall Level of Assistance: Modified independent (w/ RW)  Sit to Stand: Modified independent  Stand to Sit: Modified independent  Stand Pivot Transfers: Modified independent  Toilet Transfer: Modified independent  Gait  Overall Level of Assistance: Modified independent (w/ RW)  Distance (ft): 20 Feet (to/from chair to bathroom)     AROM: Within functional limits  PROM: Within functional limits  Strength: Within functional limits  Coordination: Within functional limits  Tone: Normal  Sensation: Intact  ADL  Feeding: Independent  Grooming: Independent  Grooming Skilled Clinical Factors: in stance at sink to brush teeth, wash face, shave face. No LOB, denies SOB  LE Dressing: Independent  LE Dressing Skilled Clinical Factors: brief exchange  Toileting: Independent  Toileting Skilled Clinical Factors: Pt able to complete all parts with IND  Additional Comments: Pt declining further ADLs              Vision  Vision: Impaired  Vision Exceptions: Wears glasses at all times  Hearing  Hearing: Exceptions to [facility]  Hearing Exceptions: Hard of hearing/hearing concerns; No hearing aid  Cognition  Overall Cognitive Status: WFL  Orientation  Overall Orientation Status: Within Functional Limits  Orientation Level: Oriented to person;Oriented to place;Oriented to time;Oriented to situation Education Provided: Role of Therapy;Plan of Care;Transfer Training;Energy Conservation  Education Method: Verbal  Barriers to Learning: None  Education Outcome: Verbalized understanding;Continued education needed       AM-PAC Score  AM-PAC Inpatient Daily Activity Raw Score: 22 (01/09/23 1136)  AM-PAC Inpatient ADL T-Scale Score : 47.1 (01/09/23 1136)  ADL Inpatient CMS 0-100% Score: 25.8 (01/09/23 1136)  ADL Inpatient CMS G-Code Modifier : CJ (01/09/23 1136)      Goals  Short Term Goals  Time Frame for Short Term Goals: 1 week (1/16) unless noted  Short Term Goal 1: Pt will complete functional transfers with IND- GOAL MET 1/09  Patient Goals   Patient goals : to return home       Therapy Time   Individual Concurrent Group Co-treatment   Time In 0815         Time Out 0849         Minutes 34         Timed Code Treatment Minutes: 24 Minutes (10 min eval)     If pt is unable to be seen after this session, please let this note serve as discharge summary. Please see case management note for discharge disposition. Thank you.     Tonja Avery, OT

## 2023-01-09 NOTE — DISCHARGE SUMMARY
Hospital Medicine Discharge Summary    Patient ID: Carroll Rivera      Patient's PCP: Tre Tijerina    Admit Date: 1/7/2023     Discharge Date:   1/9/2023    Admitting Provider: Pauline Barrientos MD     Discharge Provider: Jonathon Graham MD     Discharge Diagnoses: Active Hospital Problems    Diagnosis     Moderate COPD (chronic obstructive pulmonary disease) (HCC) [J44.9]      Priority: Medium    Asthma with COPD with exacerbation ([facility] [address] [J44.1, J45.901]      Priority: Medium    Stage 3a chronic kidney disease ([facility] [address] [N18.31]      Priority: Medium    Aortocoronary bypass status [Z95.1]      Priority: Medium    Influenza A [J10.1]      Priority: Medium    CAD (coronary artery disease) [I25.10]    HTN              HPI on 1/7/2023 by Dr José Weaver:   Carroll Rivera is a 81 y.o. male. He presented to the ER from home by ambulance. He relates he has felt ill for about 4 days. He describes feeling tired, loss of appetite, dyspnea, wheezing, nonproductive cough, and \"upset stomach\" with diarrhea. He denies rigors, swaets, and recorded fevers at home. He has had very little to eat or drink since symptom onset. Patient has a h/o moderate COPD-asthma overlap syndrome. He quit smoking in 2020. He smoked 1 pack/day for about 30 years. Patient previously received most of his care through the [facility] system. He has been transitioning care to the [place] system over the past year. Hospital Course:   Patient was admitted with acute influenza A infection with asthma/COPD exacerbation. Improved with supportive care with Tamiflu, IV steroids and bronchodilators. Required oxygen supplementation which was weaned off and was on room air however was noted to be desaturating on home O2 eval.  Home oxygen with activity arranged by . Pt had baseline CKD which was fairly stable- losartan held as slight uptrend in Cr at d/c. BP stable. Continued home BB.  D/jeb home in stable condition with PO prednisone and 3 days of tamiflu  to complete 5 day course of treatment. F/u wkith PCP in one week. Physical Exam Performed:     /74   Pulse 77   Temp 98 °F (36.7 °C) (Oral)   Resp 16   Ht 5' 8\" (1.727 m)   Wt 233 lb 1.6 oz (105.7 kg)   SpO2 92%   BMI 35.44 kg/m²       General appearance:  No apparent distress, appears stated age and cooperative. HEENT:  Normal cephalic, atraumatic without obvious deformity. . Conjunctivae/corneas clear. Neck: Supple, with full range of motion. No jugular venous distention. Trachea midline. Respiratory:  Normal respiratory effort. Clear to auscultation, bilaterally without Rales/Wheezes/Rhonchi. Cardiovascular:  Regular rate and rhythm with normal S1/S2 without murmurs, rubs or gallops. Abdomen: Soft, non-tender, non-distended with normal bowel sounds. Musculoskeletal:  No clubbing, cyanosis or edema bilaterally. Full range of motion without deformity. Skin:  warm and dry   Neurologic:   alert, moves all extremities   Psychiatric:  Alert and oriented,      Labs: For convenience and continuity at follow-up the following most recent labs are provided:      CBC:    Lab Results   Component Value Date/Time    WBC 6.6 01/09/2023 06:31 AM    HGB 12.6 01/09/2023 06:31 AM    HCT 37.4 01/09/2023 06:31 AM     01/09/2023 06:31 AM       Renal:    Lab Results   Component Value Date/Time     01/09/2023 06:31 AM    K 4.1 01/09/2023 06:31 AM    K 4.0 01/03/2023 01:02 PM    CL 96 01/09/2023 06:31 AM    CO2 22 01/09/2023 06:31 AM    BUN 36 01/09/2023 06:31 AM    CREATININE 1.5 01/09/2023 06:31 AM    CALCIUM 8.4 01/09/2023 06:31 AM    PHOS 3.6 08/20/2021 10:41 AM         Significant Diagnostic Studies    Radiology:   CT CHEST PULMONARY EMBOLISM W CONTRAST   Final Result   Motion artifact limiting the exam along the lung bases. Within the   limitations of the exam, no obvious acute pulmonary embolus can be seen.       Moderate bibasilar atelectasis and/or infiltrates versus scarring and   associated tiny bibasilar pleural effusions which is more prominent on the   left. Recommend follow-up with serial chest x-rays. Mildly dilated atherosclerotic thoracic aorta with no aneurysm or dissection. Extensive coronary artery calcifications. 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 Previous gastric bypass surgery which is unchanged with no bowel obstruction      Moderate diverticulosis throughout the left colon with no pericolonic   inflammation. CT ABDOMEN PELVIS W IV CONTRAST Additional Contrast? None   Final Result   Motion artifact limiting the exam along the lung bases. Within the   limitations of the exam, no obvious acute pulmonary embolus can be seen. Moderate bibasilar atelectasis and/or infiltrates versus scarring and   associated tiny bibasilar pleural effusions which is more prominent on the   left. Recommend follow-up with serial chest x-rays. Mildly dilated atherosclerotic thoracic aorta with no aneurysm or dissection. Extensive coronary artery calcifications. 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 Previous gastric bypass surgery which is unchanged with no bowel obstruction      Moderate diverticulosis throughout the left colon with no pericolonic   inflammation. XR CHEST (2 VW)   Final Result   No acute process.       Bibasilar hypoaeration                Consults:     IP CONSULT TO HOSPITALIST  IP CONSULT TO CASE MANAGEMENT    Disposition:  Home      Condition at Discharge: Stable    Discharge Instructions/Follow-up:  F/u with PCP in one week     Code Status:  Full Code     Activity: activity as tolerated    Diet: cardiac diet      Discharge Medications:     Current Discharge Medication List             Details   oseltamivir (TAMIFLU) 30 MG capsule Take 1 capsule by mouth 2 times daily for 3 days  Qty: 6 capsule, Refills: 0      predniSONE (DELTASONE) 20 MG tablet Take 2 tablets by mouth daily for 3 days  Qty: 6 tablet, Refills: 0                Details   albuterol sulfate HFA (PROVENTIL;VENTOLIN;PROAIR) 108 (90 Base) MCG/ACT inhaler Inhale 2 puffs into the lungs every 4 hours as needed for Shortness of Breath or Wheezing  Qty: 18 g, Refills: 3                Details   cyclobenzaprine (FLEXERIL) 10 MG tablet Take 10 mg by mouth 3 times daily as needed      !! vitamin D 25 MCG (1000 UT) CAPS Take 1,000 Units by mouth daily      pantoprazole (PROTONIX) 40 MG tablet Take 1 tablet by mouth 2 times daily (before meals)  Qty: 60 tablet, Refills: 0      aspirin EC 81 MG EC tablet Take 1 tablet by mouth daily  Qty: 90 tablet, Refills: 1      !! Cholecalciferol (VITAMIN D-3 PO) Take by mouth      Coenzyme Q10 (COQ10 PO) Take by mouth      Biotin 5000 MCG TABS Take by mouth      lovastatin (MEVACOR) 20 MG tablet Take 20 mg by mouth nightly      IRON CR PO Take by mouth daily      Multiple Vitamins-Minerals (THERAPEUTIC MULTIVITAMIN-MINERALS) tablet Take 1 tablet by mouth daily      Cyanocobalamin (VITAMIN B-12 CR PO) Take by mouth daily      Ascorbic Acid (VITAMIN C) 500 MG tablet Take 500 mg by mouth daily      nitroGLYCERIN (NITROSTAT) 0.4 MG SL tablet Place 0.4 mg under the tongue every 5 minutes as needed. metoprolol (LOPRESSOR) 50 MG tablet Take 50 mg by mouth 2 times daily. levothyroxine (SYNTHROID) 100 MCG tablet Take 50 mcg by mouth daily. montelukast (SINGULAIR) 10 MG tablet Take 10 mg by mouth nightly. !! - Potential duplicate medications found. Please discuss with provider. Time Spent on discharge: 35 minutes  in the examination, evaluation, counseling and review of medications and discharge plan.       Signed:    Wilver Bello MD   1/9/2023      Thank you Santiago Joe for the opportunity to be involved in this patient's care. If you have any questions or concerns, please feel free to contact me at [phone].

## 2023-01-09 NOTE — CARE COORDINATION
CASE MANAGEMENT INITIAL ASSESSMENT      Reviewed chart and completed assessment with patient:bedside  Family present: none  Explained Case Management role/services. Primary contact information:    Health Care Decision Maker :   Primary Decision Maker: Cezar Arthur - [phone]    Secondary Decision Maker: Brennan Villagran - [phone]          Can this person be reached and be able to respond quickly, such as within a few minutes or hours? Yes      Admit date/status:1/7/23  Diagnosis:hyponatremia   Is this a Readmission?:  No      Insurance:medicare   Precert required for SNF: No       3 night stay required: Yes    Living arrangements, Adls, care needs, prior to admission:lives in home with wife and disabled son. Durable Medical Equipment at home:  Walker_x_/ rollatorCane_x_RTS__ BSC__Shower Chair__  02__ HHN__ CPAP__  BiPap__  Hospital Bed__ W/C___ Other_____    Services in the home and/or outpatient, prior to admission:none    Current PCP:chick                                Medications Prescription coverage? yes    Transportation needs: none       PT/OT recs:no home care needs    Hospital Exemption Notification (HEN):needed for SNF    Barriers to discharge:none    Plan/comments:spoke with patient. Plan on returning home at discharge. Reported IPTA and will be able to get to any fu appts. D/c order noted. Spoke with Cyril Matt RN, will need new O2. Referral to [employer] as patient states will not be able to manage big tanks.  Approved for delivery will bring to hospital. Amena Clark RN       ECOC on chart for MD signature

## 2023-01-09 NOTE — CARE COORDINATION
[facility]    Patient declined home health care per Kit AUBREY Schwartz RN, BSN CTN  [facility] [phone]

## 2023-01-09 NOTE — PLAN OF CARE
Problem: Pain  Goal: Verbalizes/displays adequate comfort level or baseline comfort level  Outcome: Progressing  Flowsheets (Taken 1/8/2023 2039)  Verbalizes/displays adequate comfort level or baseline comfort level:   Encourage patient to monitor pain and request assistance   Assess pain using appropriate pain scale   Administer analgesics based on type and severity of pain and evaluate response   Implement non-pharmacological measures as appropriate and evaluate response   Consider cultural and social influences on pain and pain management   Notify Licensed Independent Practitioner if interventions unsuccessful or patient reports new pain  Note: Pt denying pain currently, will continue to assess. Problem: Safety - Adult  Goal: Free from fall injury  Outcome: Progressing  Flowsheets (Taken 1/8/2023 2244)  Free From Fall Injury:   Instruct family/caregiver on patient safety   Based on caregiver fall risk screen, instruct family/caregiver to ask for assistance with transferring infant if caregiver noted to have fall risk factors  Note: Pt ambulating standby assist with a walker, and calls out appropriately. Problem: ABCDS Injury Assessment  Goal: Absence of physical injury  Outcome: Progressing     Problem: Skin/Tissue Integrity  Goal: Absence of new skin breakdown  Description: 1. Monitor for areas of redness and/or skin breakdown  2. Assess vascular access sites hourly  3. Every 4-6 hours minimum:  Change oxygen saturation probe site  4. Every 4-6 hours:  If on nasal continuous positive airway pressure, respiratory therapy assess nares and determine need for appliance change or resting period. Outcome: Progressing  Note: Pt is continent.

## 2023-01-09 NOTE — PROGRESS NOTES
Pt AOx4, VSS, shift assessment completed and charted. Pt c/o mild ABD pain, scheduled bentyl given, with great relief. Pt with profuse abdominal scarring from severe history of abdominal and gastric surgeries, with 2 small stitches noted on abdomen. No drainage noted, except there is a very small, black spot in the center of the stitches. Pt with no pain in that area, and states he has \"had it forever\". Pt normally ambulates independently with cane, but due to malaise and general weakness, has been instructed to c/o and c/o appropriately, and ambulates SBA with walker. Pt breath sounds diminished with crackles, and pt is on 2L NC, RA their baseline. Pt with vascular discoloration BLE, scattered bruising, and redness. No redness on coccyx. Pt denies further needs, and was in stable condiiton when this RN left the room. Bed is low, locked, alarmed, and pt in chair with call light/bedside table in reach. All care per orders, will continue to assess as appropriate. Electronically signed by Carman Goodell, RN on 1/9/2023 at 2:43 AM    Pt weaned to RA with SpO2 at 94%. Electronically signed by Carman Goodell, RN on 1/9/2023 at 5:12 AM    Walking 40 ft from the chair to the bathroom, on RA, pt was 89% SpO2 and took one minute to return to >90% RA. Will inform AM RN.  Electronically signed by Carman Goodell, RN on 1/9/2023 at 5:42 AM

## 2023-01-09 NOTE — PROGRESS NOTES
Oxygen documentation:    O2 saturation at REST on ROOM AIR = 92%    If saturation is 89% or above please proceed with steps 2 and 3. .........     O2 saturation with AMBULATION of 15 feet on ROOM AIR = 85%  O2 saturation with AMBULATION on current 2 liter flow = 91%    DCP notified:Yes    Signature: Lambert Rosas RN Date:1/9/2023  NJGS:[id number]

## 2023-01-20 ENCOUNTER — ANESTHESIA EVENT (OUTPATIENT)
Dept: ENDOSCOPY | Age: 83
End: 2023-01-20
Payer: OTHER GOVERNMENT

## 2023-01-20 ENCOUNTER — HOSPITAL ENCOUNTER (INPATIENT)
Age: 83
LOS: 1 days | Discharge: HOME | DRG: 378 | End: 2023-01-20
Attending: EMERGENCY MEDICINE | Admitting: INTERNAL MEDICINE
Payer: MEDICARE

## 2023-01-20 ENCOUNTER — ANESTHESIA (OUTPATIENT)
Dept: ENDOSCOPY | Age: 83
End: 2023-01-20
Payer: OTHER GOVERNMENT

## 2023-01-20 VITALS
HEART RATE: 77 BPM | TEMPERATURE: 98 F | BODY MASS INDEX: 34.56 KG/M2 | DIASTOLIC BLOOD PRESSURE: 77 MMHG | SYSTOLIC BLOOD PRESSURE: 138 MMHG | HEIGHT: 68 IN | OXYGEN SATURATION: 96 % | RESPIRATION RATE: 18 BRPM | WEIGHT: 228 LBS

## 2023-01-20 DIAGNOSIS — D62 ACUTE BLOOD LOSS ANEMIA: ICD-10-CM

## 2023-01-20 DIAGNOSIS — K92.2 UPPER GI BLEED: ICD-10-CM

## 2023-01-20 DIAGNOSIS — K92.2 GASTROINTESTINAL HEMORRHAGE, UNSPECIFIED GASTROINTESTINAL HEMORRHAGE TYPE: ICD-10-CM

## 2023-01-20 DIAGNOSIS — K92.2 UGIB (UPPER GASTROINTESTINAL BLEED): Primary | ICD-10-CM

## 2023-01-20 PROBLEM — R11.0 NAUSEA: Status: ACTIVE | Noted: 2023-01-20

## 2023-01-20 PROBLEM — R73.9 HYPERGLYCEMIA: Status: ACTIVE | Noted: 2023-01-20

## 2023-01-20 PROBLEM — N18.30 ACUTE RENAL FAILURE SUPERIMPOSED ON STAGE 3 CHRONIC KIDNEY DISEASE (HCC): Status: ACTIVE | Noted: 2021-08-17

## 2023-01-20 LAB
A/G RATIO: 1.2 (ref 1.1–2.2)
ABO/RH: NORMAL
ALBUMIN SERPL-MCNC: 3.2 G/DL (ref 3.4–5)
ALP BLD-CCNC: 95 U/L (ref 40–129)
ALT SERPL-CCNC: 57 U/L (ref 10–40)
ANION GAP SERPL CALCULATED.3IONS-SCNC: 10 MMOL/L (ref 3–16)
ANTIBODY SCREEN: NORMAL
APTT: 23.7 SEC (ref 23–34.3)
AST SERPL-CCNC: 36 U/L (ref 15–37)
BASOPHILS ABSOLUTE: 0.1 K/UL (ref 0–0.2)
BASOPHILS RELATIVE PERCENT: 0.4 %
BILIRUB SERPL-MCNC: 0.4 MG/DL (ref 0–1)
BUN BLDV-MCNC: 40 MG/DL (ref 7–20)
CALCIUM SERPL-MCNC: 8.1 MG/DL (ref 8.3–10.6)
CHLORIDE BLD-SCNC: 105 MMOL/L (ref 99–110)
CO2: 23 MMOL/L (ref 21–32)
CREAT SERPL-MCNC: 1.7 MG/DL (ref 0.8–1.3)
EOSINOPHILS ABSOLUTE: 0.1 K/UL (ref 0–0.6)
EOSINOPHILS RELATIVE PERCENT: 0.8 %
GFR SERPL CREATININE-BSD FRML MDRD: 40 ML/MIN/{1.73_M2}
GLUCOSE BLD-MCNC: 148 MG/DL (ref 70–99)
GLUCOSE BLD-MCNC: 160 MG/DL (ref 70–99)
GLUCOSE BLD-MCNC: 171 MG/DL (ref 70–99)
GLUCOSE BLD-MCNC: 182 MG/DL (ref 70–99)
GLUCOSE BLD-MCNC: 237 MG/DL (ref 70–99)
HCT VFR BLD CALC: 28.4 % (ref 40.5–52.5)
HCT VFR BLD CALC: 30.6 % (ref 40.5–52.5)
HCT VFR BLD CALC: 32.2 % (ref 40.5–52.5)
HEMOGLOBIN: 10.6 G/DL (ref 13.5–17.5)
HEMOGLOBIN: 9.3 G/DL (ref 13.5–17.5)
HEMOGLOBIN: 9.4 G/DL (ref 13.5–17.5)
INR BLD: 1.2 (ref 0.87–1.14)
LYMPHOCYTES ABSOLUTE: 1 K/UL (ref 1–5.1)
LYMPHOCYTES RELATIVE PERCENT: 8.1 %
MCH RBC QN AUTO: 31.2 PG (ref 26–34)
MCHC RBC AUTO-ENTMCNC: 32.8 G/DL (ref 31–36)
MCV RBC AUTO: 95.1 FL (ref 80–100)
MONOCYTES ABSOLUTE: 0.6 K/UL (ref 0–1.3)
MONOCYTES RELATIVE PERCENT: 5.4 %
NEUTROPHILS ABSOLUTE: 10.1 K/UL (ref 1.7–7.7)
NEUTROPHILS RELATIVE PERCENT: 85.3 %
OCCULT BLOOD SCREENING: ABNORMAL
PDW BLD-RTO: 14.8 % (ref 12.4–15.4)
PERFORMED ON: ABNORMAL
PLATELET # BLD: 289 K/UL (ref 135–450)
PMV BLD AUTO: 8.3 FL (ref 5–10.5)
POTASSIUM REFLEX MAGNESIUM: 5.2 MMOL/L (ref 3.5–5.1)
PROTHROMBIN TIME: 15.1 SEC (ref 11.7–14.5)
RBC # BLD: 3.38 M/UL (ref 4.2–5.9)
SODIUM BLD-SCNC: 138 MMOL/L (ref 136–145)
TOTAL PROTEIN: 5.9 G/DL (ref 6.4–8.2)
WBC # BLD: 11.9 K/UL (ref 4–11)

## 2023-01-20 PROCEDURE — 6360000002 HC RX W HCPCS: Performed by: INTERNAL MEDICINE

## 2023-01-20 PROCEDURE — 2580000003 HC RX 258: Performed by: NURSE ANESTHETIST, CERTIFIED REGISTERED

## 2023-01-20 PROCEDURE — 99285 EMERGENCY DEPT VISIT HI MDM: CPT

## 2023-01-20 PROCEDURE — 6360000002 HC RX W HCPCS: Performed by: EMERGENCY MEDICINE

## 2023-01-20 PROCEDURE — 7100000010 HC PHASE II RECOVERY - FIRST 15 MIN: Performed by: INTERNAL MEDICINE

## 2023-01-20 PROCEDURE — 82270 OCCULT BLOOD FECES: CPT

## 2023-01-20 PROCEDURE — 85014 HEMATOCRIT: CPT

## 2023-01-20 PROCEDURE — C9113 INJ PANTOPRAZOLE SODIUM, VIA: HCPCS | Performed by: EMERGENCY MEDICINE

## 2023-01-20 PROCEDURE — 2580000003 HC RX 258: Performed by: INTERNAL MEDICINE

## 2023-01-20 PROCEDURE — 85610 PROTHROMBIN TIME: CPT

## 2023-01-20 PROCEDURE — 6370000000 HC RX 637 (ALT 250 FOR IP): Performed by: INTERNAL MEDICINE

## 2023-01-20 PROCEDURE — 86850 RBC ANTIBODY SCREEN: CPT

## 2023-01-20 PROCEDURE — 2500000003 HC RX 250 WO HCPCS: Performed by: NURSE ANESTHETIST, CERTIFIED REGISTERED

## 2023-01-20 PROCEDURE — 96374 THER/PROPH/DIAG INJ IV PUSH: CPT

## 2023-01-20 PROCEDURE — 6360000002 HC RX W HCPCS: Performed by: NURSE ANESTHETIST, CERTIFIED REGISTERED

## 2023-01-20 PROCEDURE — 85018 HEMOGLOBIN: CPT

## 2023-01-20 PROCEDURE — 88305 TISSUE EXAM BY PATHOLOGIST: CPT

## 2023-01-20 PROCEDURE — 0DB68ZX EXCISION OF STOMACH, VIA NATURAL OR ARTIFICIAL OPENING ENDOSCOPIC, DIAGNOSTIC: ICD-10-PCS | Performed by: INTERNAL MEDICINE

## 2023-01-20 PROCEDURE — 85730 THROMBOPLASTIN TIME PARTIAL: CPT

## 2023-01-20 PROCEDURE — 86901 BLOOD TYPING SEROLOGIC RH(D): CPT

## 2023-01-20 PROCEDURE — C9113 INJ PANTOPRAZOLE SODIUM, VIA: HCPCS | Performed by: INTERNAL MEDICINE

## 2023-01-20 PROCEDURE — 7100000011 HC PHASE II RECOVERY - ADDTL 15 MIN: Performed by: INTERNAL MEDICINE

## 2023-01-20 PROCEDURE — 80053 COMPREHEN METABOLIC PANEL: CPT

## 2023-01-20 PROCEDURE — 86900 BLOOD TYPING SEROLOGIC ABO: CPT

## 2023-01-20 PROCEDURE — 85025 COMPLETE CBC W/AUTO DIFF WBC: CPT

## 2023-01-20 PROCEDURE — 3700000000 HC ANESTHESIA ATTENDED CARE: Performed by: INTERNAL MEDICINE

## 2023-01-20 PROCEDURE — 1200000000 HC SEMI PRIVATE

## 2023-01-20 PROCEDURE — 3609012400 HC EGD TRANSORAL BIOPSY SINGLE/MULTIPLE: Performed by: INTERNAL MEDICINE

## 2023-01-20 PROCEDURE — 36415 COLL VENOUS BLD VENIPUNCTURE: CPT

## 2023-01-20 PROCEDURE — 2709999900 HC NON-CHARGEABLE SUPPLY: Performed by: INTERNAL MEDICINE

## 2023-01-20 RX ORDER — ONDANSETRON 2 MG/ML
4 INJECTION INTRAMUSCULAR; INTRAVENOUS EVERY 4 HOURS PRN
Status: DISCONTINUED | OUTPATIENT
Start: 2023-01-20 | End: 2023-01-20

## 2023-01-20 RX ORDER — LEVOTHYROXINE SODIUM 0.1 MG/1
100 TABLET ORAL
Status: DISCONTINUED | OUTPATIENT
Start: 2023-01-20 | End: 2023-01-20 | Stop reason: HOSPADM

## 2023-01-20 RX ORDER — MONTELUKAST SODIUM 10 MG/1
10 TABLET ORAL NIGHTLY
Status: DISCONTINUED | OUTPATIENT
Start: 2023-01-20 | End: 2023-01-20 | Stop reason: HOSPADM

## 2023-01-20 RX ORDER — SODIUM CHLORIDE, SODIUM LACTATE, POTASSIUM CHLORIDE, CALCIUM CHLORIDE 600; 310; 30; 20 MG/100ML; MG/100ML; MG/100ML; MG/100ML
1000 INJECTION, SOLUTION INTRAVENOUS CONTINUOUS
Status: DISCONTINUED | OUTPATIENT
Start: 2023-01-20 | End: 2023-01-20

## 2023-01-20 RX ORDER — ATORVASTATIN CALCIUM 10 MG/1
10 TABLET, FILM COATED ORAL DAILY
Status: DISCONTINUED | OUTPATIENT
Start: 2023-01-20 | End: 2023-01-20 | Stop reason: HOSPADM

## 2023-01-20 RX ORDER — OXYCODONE HYDROCHLORIDE 5 MG/1
5 TABLET ORAL EVERY 4 HOURS PRN
Status: DISCONTINUED | OUTPATIENT
Start: 2023-01-20 | End: 2023-01-20 | Stop reason: HOSPADM

## 2023-01-20 RX ORDER — POLYETHYLENE GLYCOL 3350 17 G/17G
17 POWDER, FOR SOLUTION ORAL DAILY PRN
Status: DISCONTINUED | OUTPATIENT
Start: 2023-01-20 | End: 2023-01-20 | Stop reason: HOSPADM

## 2023-01-20 RX ORDER — PANTOPRAZOLE SODIUM 40 MG/10ML
40 INJECTION, POWDER, LYOPHILIZED, FOR SOLUTION INTRAVENOUS EVERY 12 HOURS
Status: DISCONTINUED | OUTPATIENT
Start: 2023-01-20 | End: 2023-01-20 | Stop reason: HOSPADM

## 2023-01-20 RX ORDER — SODIUM CHLORIDE 9 MG/ML
INJECTION, SOLUTION INTRAVENOUS PRN
Status: DISCONTINUED | OUTPATIENT
Start: 2023-01-20 | End: 2023-01-20 | Stop reason: HOSPADM

## 2023-01-20 RX ORDER — SODIUM CHLORIDE 9 MG/ML
INJECTION, SOLUTION INTRAVENOUS CONTINUOUS
Status: DISCONTINUED | OUTPATIENT
Start: 2023-01-20 | End: 2023-01-20

## 2023-01-20 RX ORDER — PROPOFOL 10 MG/ML
INJECTION, EMULSION INTRAVENOUS PRN
Status: DISCONTINUED | OUTPATIENT
Start: 2023-01-20 | End: 2023-01-20 | Stop reason: SDUPTHER

## 2023-01-20 RX ORDER — PANTOPRAZOLE SODIUM 40 MG/1
40 TABLET, DELAYED RELEASE ORAL
Qty: 60 TABLET | Refills: 3 | Status: SHIPPED | OUTPATIENT
Start: 2023-01-20 | End: 2023-02-19

## 2023-01-20 RX ORDER — PANTOPRAZOLE SODIUM 40 MG/10ML
80 INJECTION, POWDER, LYOPHILIZED, FOR SOLUTION INTRAVENOUS ONCE
Status: COMPLETED | OUTPATIENT
Start: 2023-01-20 | End: 2023-01-20

## 2023-01-20 RX ORDER — METOPROLOL TARTRATE 50 MG/1
50 TABLET, FILM COATED ORAL 2 TIMES DAILY
Status: DISCONTINUED | OUTPATIENT
Start: 2023-01-20 | End: 2023-01-20 | Stop reason: HOSPADM

## 2023-01-20 RX ORDER — LIDOCAINE HYDROCHLORIDE 20 MG/ML
INJECTION, SOLUTION INFILTRATION; PERINEURAL PRN
Status: DISCONTINUED | OUTPATIENT
Start: 2023-01-20 | End: 2023-01-20 | Stop reason: SDUPTHER

## 2023-01-20 RX ORDER — ACETAMINOPHEN 325 MG/1
650 TABLET ORAL EVERY 4 HOURS PRN
Status: DISCONTINUED | OUTPATIENT
Start: 2023-01-20 | End: 2023-01-20 | Stop reason: HOSPADM

## 2023-01-20 RX ORDER — SODIUM CHLORIDE 0.9 % (FLUSH) 0.9 %
10 SYRINGE (ML) INJECTION EVERY 12 HOURS SCHEDULED
Status: DISCONTINUED | OUTPATIENT
Start: 2023-01-20 | End: 2023-01-20 | Stop reason: HOSPADM

## 2023-01-20 RX ORDER — ACETAMINOPHEN 650 MG/1
650 SUPPOSITORY RECTAL EVERY 4 HOURS PRN
Status: DISCONTINUED | OUTPATIENT
Start: 2023-01-20 | End: 2023-01-20 | Stop reason: HOSPADM

## 2023-01-20 RX ORDER — SODIUM CHLORIDE 0.9 % (FLUSH) 0.9 %
10 SYRINGE (ML) INJECTION PRN
Status: DISCONTINUED | OUTPATIENT
Start: 2023-01-20 | End: 2023-01-20 | Stop reason: HOSPADM

## 2023-01-20 RX ORDER — ALBUTEROL SULFATE 90 UG/1
2 AEROSOL, METERED RESPIRATORY (INHALATION) EVERY 4 HOURS PRN
Status: DISCONTINUED | OUTPATIENT
Start: 2023-01-20 | End: 2023-01-20 | Stop reason: HOSPADM

## 2023-01-20 RX ORDER — SODIUM CHLORIDE, SODIUM LACTATE, POTASSIUM CHLORIDE, CALCIUM CHLORIDE 600; 310; 30; 20 MG/100ML; MG/100ML; MG/100ML; MG/100ML
INJECTION, SOLUTION INTRAVENOUS CONTINUOUS PRN
Status: DISCONTINUED | OUTPATIENT
Start: 2023-01-20 | End: 2023-01-20 | Stop reason: SDUPTHER

## 2023-01-20 RX ORDER — SUCRALFATE 1 G/1
1 TABLET ORAL 4 TIMES DAILY
Qty: 120 TABLET | Refills: 3 | Status: SHIPPED | OUTPATIENT
Start: 2023-01-20

## 2023-01-20 RX ORDER — INSULIN LISPRO 100 [IU]/ML
0-8 INJECTION, SOLUTION INTRAVENOUS; SUBCUTANEOUS EVERY 4 HOURS
Status: DISCONTINUED | OUTPATIENT
Start: 2023-01-20 | End: 2023-01-20 | Stop reason: HOSPADM

## 2023-01-20 RX ORDER — DEXTROSE MONOHYDRATE 100 MG/ML
INJECTION, SOLUTION INTRAVENOUS CONTINUOUS PRN
Status: DISCONTINUED | OUTPATIENT
Start: 2023-01-20 | End: 2023-01-20 | Stop reason: HOSPADM

## 2023-01-20 RX ORDER — OXYCODONE HYDROCHLORIDE 5 MG/1
10 TABLET ORAL EVERY 4 HOURS PRN
Status: DISCONTINUED | OUTPATIENT
Start: 2023-01-20 | End: 2023-01-20 | Stop reason: HOSPADM

## 2023-01-20 RX ADMIN — LEVOTHYROXINE SODIUM 100 MCG: 0.1 TABLET ORAL at 08:58

## 2023-01-20 RX ADMIN — OXYCODONE HYDROCHLORIDE 10 MG: 5 TABLET ORAL at 06:33

## 2023-01-20 RX ADMIN — PROPOFOL 50 MG: 10 INJECTION, EMULSION INTRAVENOUS at 07:47

## 2023-01-20 RX ADMIN — PANTOPRAZOLE SODIUM 80 MG: 40 INJECTION, POWDER, FOR SOLUTION INTRAVENOUS at 05:00

## 2023-01-20 RX ADMIN — METOPROLOL TARTRATE 50 MG: 50 TABLET ORAL at 08:58

## 2023-01-20 RX ADMIN — SODIUM CHLORIDE, SODIUM LACTATE, POTASSIUM CHLORIDE, AND CALCIUM CHLORIDE: .6; .31; .03; .02 INJECTION, SOLUTION INTRAVENOUS at 07:47

## 2023-01-20 RX ADMIN — ATORVASTATIN CALCIUM 10 MG: 10 TABLET, FILM COATED ORAL at 08:58

## 2023-01-20 RX ADMIN — LIDOCAINE HYDROCHLORIDE 100 MG: 20 INJECTION, SOLUTION INFILTRATION; PERINEURAL at 07:47

## 2023-01-20 RX ADMIN — SODIUM CHLORIDE, POTASSIUM CHLORIDE, SODIUM LACTATE AND CALCIUM CHLORIDE 1000 ML: 600; 310; 30; 20 INJECTION, SOLUTION INTRAVENOUS at 07:36

## 2023-01-20 RX ADMIN — PANTOPRAZOLE SODIUM 40 MG: 40 INJECTION, POWDER, FOR SOLUTION INTRAVENOUS at 16:26

## 2023-01-20 ASSESSMENT — PAIN SCALES - GENERAL
PAINLEVEL_OUTOF10: 0
PAINLEVEL_OUTOF10: 5
PAINLEVEL_OUTOF10: 7
PAINLEVEL_OUTOF10: 0
PAINLEVEL_OUTOF10: 0

## 2023-01-20 ASSESSMENT — PAIN DESCRIPTION - LOCATION
LOCATION: ABDOMEN
LOCATION: ABDOMEN

## 2023-01-20 ASSESSMENT — PAIN - FUNCTIONAL ASSESSMENT
PAIN_FUNCTIONAL_ASSESSMENT: 0-10
PAIN_FUNCTIONAL_ASSESSMENT: 0-10

## 2023-01-20 ASSESSMENT — LIFESTYLE VARIABLES: HOW OFTEN DO YOU HAVE A DRINK CONTAINING ALCOHOL: NEVER

## 2023-01-20 NOTE — OP NOTE
Esophagogastroduodenoscopy Note    Patient:   Sonia Villanueva    YOB: 1940    Facility:   [facility] [Inpatient]   Referring/PCP: Svitlana Rivers MD    Procedure:   Esophagogastroduodenoscopy --diagnostic  Date:     1/20/2023   Endoscopist:  Andrey Macias MD     Preoperative Diagnosis: Melena and h/o /DU  Postoperative Diagnosis: White based 1.5 cm anastomotic gastric ulcer w a flat dark stain, but no active bleeding, biopsied for H PYLORI    Anesthesia:  MAC   Estimated blood loss: None  Complications: None    Description of Procedure:  Informed consent was obtained from the patient after explanation of the procedure including indications, description of the procedure,  benefits and possible risks and complications of the procedure, and alternatives. Questions were answered. The patient's history was reviewed and a directed physical examination was performed prior to the procedure. Patient was monitored throughout the procedure with pulse oximetry and periodic assessment of vital signs. Patient was sedated as noted above. The Nursing staff and I performed a time out. With the patient in the left lateral decubitus position, the Olympus videoendoscope was placed in the patient's mouth and under direct visualization passed into the esophagus. The scope was ultimately passed to the third portion of the duodenum. Visualization was performed during both introduction and withdrawal of the endoscope and retroflexed view of the proximal stomach was obtained. Findings[de-identified]   Esophagus: Mild GEJ stricture. The findings do not support a diagnosis of Puri's Esophagus. Stomach: S/P B-II.  White based 1.5 cm anastomotic gastric ulcer w a flat dark stain, but no active bleeding, biopsied for H PYLORI  Duodenum/Jejunum: normal    Recommendations: -Acid suppression with bid proton pump inhibitor and will need Carafate, -Follow clinical symptoms and laboratory studies for evidence of rebleeding.     Kleber Perry MD       ([phone]) [phone]        Kleber Perry MD, MD

## 2023-01-20 NOTE — ANESTHESIA PRE PROCEDURE
Department of Anesthesiology  Preprocedure Note       Name:  Ciara Velasquez   Age:  81 y.o.  :  1940                                          MRN:  [id number]         Date:  2023      Surgeon: Nika Macias):  Carmella Landis MD    Procedure: Procedure(s):  EGD DIAGNOSTIC ONLY    Medications prior to admission:   Prior to Admission medications    Medication Sig Start Date End Date Taking? Authorizing Provider   albuterol sulfate HFA (PROVENTIL;VENTOLIN;PROAIR) 108 (90 Base) MCG/ACT inhaler Inhale 2 puffs into the lungs every 4 hours as needed for Shortness of Breath or Wheezing 23   Adrianna Bello MD   vitamin D 25 MCG (1000 UT) CAPS Take 1,000 Units by mouth daily    Historical Provider, MD   cyclobenzaprine (FLEXERIL) 10 MG tablet Take 10 mg by mouth 3 times daily as needed 10/12/21   Historical Provider, MD   pantoprazole (PROTONIX) 40 MG tablet Take 1 tablet by mouth 2 times daily (before meals) 21  Beryle Chandler, MD   aspirin EC 81 MG EC tablet Take 1 tablet by mouth daily 20   Yossi Torres MD   Cholecalciferol (VITAMIN D-3 PO) Take by mouth    Historical Provider, MD   Coenzyme Q10 (COQ10 PO) Take by mouth    Historical Provider, MD   Biotin 5000 MCG TABS Take by mouth    Historical Provider, MD   lovastatin (MEVACOR) 20 MG tablet Take 20 mg by mouth nightly    Historical Provider, MD   IRON CR PO Take by mouth daily    Historical Provider, MD   Multiple Vitamins-Minerals (THERAPEUTIC MULTIVITAMIN-MINERALS) tablet Take 1 tablet by mouth daily    Historical Provider, MD   Cyanocobalamin (VITAMIN B-12 CR PO) Take by mouth daily    Historical Provider, MD   Ascorbic Acid (VITAMIN C) 500 MG tablet Take 500 mg by mouth daily    Historical Provider, MD   nitroGLYCERIN (NITROSTAT) 0.4 MG SL tablet Place 0.4 mg under the tongue every 5 minutes as needed. Historical Provider, MD   metoprolol (LOPRESSOR) 50 MG tablet Take 50 mg by mouth 2 times daily.       Historical Provider, MD   levothyroxine (SYNTHROID) 100 MCG tablet Take 50 mcg by mouth daily. Historical Provider, MD   montelukast (SINGULAIR) 10 MG tablet Take 10 mg by mouth nightly.       Historical Provider, MD       Current medications:    Current Facility-Administered Medications   Medication Dose Route Frequency Provider Last Rate Last Admin    montelukast (SINGULAIR) tablet 10 mg  10 mg Oral Nightly Emily Magallanes MD        metoprolol tartrate (LOPRESSOR) tablet 50 mg  50 mg Oral BID Emily Magallanes MD        atorvastatin (LIPITOR) tablet 10 mg  10 mg Oral Daily Emily Magallanes MD        levothyroxine (SYNTHROID) tablet 100 mcg  100 mcg Oral QAM AC Emily Magallanes MD        albuterol sulfate HFA (PROVENTIL;VENTOLIN;PROAIR) 108 (90 Base) MCG/ACT inhaler 2 puff  2 puff Inhalation Q4H PRN Emily Magallanes MD        sodium chloride flush 0.9 % injection 10 mL  10 mL IntraVENous 2 times per day Emily Magallanes MD        sodium chloride flush 0.9 % injection 10 mL  10 mL IntraVENous PRN Emily Magallanes MD        0.9 % sodium chloride infusion   IntraVENous PRN Emily Magallanes MD        polyethylene glycol [facility]) packet 17 g  17 g Oral Daily PRN Emily Magallanes MD        acetaminophen (TYLENOL) tablet 650 mg  650 mg Oral Q4H PRN Emily Magallanes MD        Or    acetaminophen (TYLENOL) suppository 650 mg  650 mg Rectal Q4H PRN Emily Magallanes MD        0.9 % sodium chloride infusion   IntraVENous Continuous Emily Magallanes MD        glucose chewable tablet 16 g  4 tablet Oral PRN Emily Magallanes MD        dextrose bolus 10% 125 mL  125 mL IntraVENous PRN Emily Magallanes MD        Or    dextrose bolus 10% 250 mL  250 mL IntraVENous PRN Emily Magallanes MD        glucagon (rDNA) injection 1 mg  1 mg SubCUTAneous PRN Emily Magallanes MD        dextrose 10 % infusion   IntraVENous Continuous PRN Emily Magallanes MD        insulin lispro (HUMALOG) injection vial 0-8 Units  0-8 Units SubCUTAneous Q4H Etelvina Pleitez MD        pantoprazole (PROTONIX) injection 40 mg  40 mg IntraVENous Q12H Etelvina Pleitez MD        oxyCODONE (ROXICODONE) immediate release tablet 5 mg  5 mg Oral Q4H PRN Etelvina Pleitez MD        Or    oxyCODONE (ROXICODONE) immediate release tablet 10 mg  10 mg Oral Q4H PRN Etelvina Pleitez MD   10 mg at 01/20/23 6528    lactated ringers IV soln infusion 1,000 mL  1,000 mL IntraVENous Continuous Carlo Garcia MD           Allergies:     Allergies   Allergen Reactions    Mixed Ragweed Shortness Of Breath     Pollen  Pollen    Lovastatin Itching    Omeprazole Dizziness or Vertigo    Seasonal      Pollen    Clindamycin Hcl Rash    Pravastatin Rash     Itch?  itching         Problem List:    Patient Active Problem List   Diagnosis Code    Primary localized osteoarthrosis, lower leg M17.10    Rotator cuff strain S46.019A    Shoulder pain M25.519    Complete rotator cuff tear M75.120    Colon ulcer K63.3    Rotator cuff arthropathy M12.819    Primary osteoarthritis of left knee M17.12    GI bleed K92.2    Obesity E66.9    Hyperkalemia E87.5    Acute renal failure superimposed on stage 3 chronic kidney disease (HCC) N17.9, N18.30    Anemia D64.9    Essential hypertension I10    CAD (coronary artery disease) I25.10    Hyperlipidemia E78.5    Acquired hypothyroidism E03.9    COPD (chronic obstructive pulmonary disease) ([facility]) J44.9    Asthma with COPD with exacerbation ([facility]) J44.1, J45.901    Stage 3a chronic kidney disease ([facility]) N18.31    Aortocoronary bypass status Z95.1    Influenza A J10.1    Upper GI bleed K92.2    Acute blood loss anemia D62    Hyperglycemia R73.9    Nausea R11.0       Past Medical History:        Diagnosis Date    Arthritis     Asthma     COPD    CAD (coronary artery disease)     S/P CABG    Gastric bypass status for obesity     Hyperlipidemia     Hypertension     Low kidney function     Osteoarthritis     Thyroid disease        Past Surgical History:        Procedure Laterality Date    COLECTOMY  2003    COLONOSCOPY  5/11    diverticulosis    COLONOSCOPY  5/6/2016    Colon Ulcer    COLONOSCOPY N/A 6/21/2020    COLONOSCOPY DIAGNOSTIC performed by Dwaine Portillo MD at [facility]  2007    3 VESSEL    COSMETIC SURGERY      removed skin from stomach    FOOT SURGERY Bilateral     toenails removed    GASTRIC BYPASS SURGERY  2001   [facility] HERNIA REPAIR  5249    umbilical    NASAL SEPTUM SURGERY  1995    SHOULDER ARTHROSCOPY Right 9/8/15    rtc repair   [facility]    vocal cord     UPPER GASTROINTESTINAL ENDOSCOPY N/A 6/22/2020    EGD BIOPSY performed by Dwaine Portillo MD at [place] [address] N/A 8/19/2021    EGD ESOPHAGOGASTRODUODENOSCOPY performed by Dwaine Portillo MD at [facility]. [facility] History:    Social History     Tobacco Use    Smoking status: Former     Packs/day: 1.00     Years: 20.00     Pack years: 20.00     Types: Cigarettes    Smokeless tobacco: Never    Tobacco comments:     plans on quitting 9/2016 when he gets a total knee   Substance Use Topics    Alcohol use: No     Comment: pt poor historian                                Counseling given: Not Answered  Tobacco comments: plans on quitting 9/2016 when he gets a total knee      Vital Signs (Current):   Vitals:    01/20/23 0444 01/20/23 0520 01/20/23 0615 01/20/23 0633   BP: 114/74  109/63    Pulse: 92  69    Resp: 16  16 18   Temp: 98 °F (36.7 °C)  97.8 °F (36.6 °C)    TempSrc: Oral  Oral    SpO2: 96%  96%    Weight: 228 lb (103.4 kg) 228 lb (103.4 kg)     Height: 5' 8\" (1.727 m) 5' 8\" (1.727 m)                                                BP Readings from Last 3 Encounters:   01/20/23 109/63   01/09/23 134/74   01/03/23 (!) 141/81       NPO Status:                                                                                 BMI:   Wt Readings from Last 3 Encounters: 01/20/23 228 lb (103.4 kg)   01/09/23 233 lb 1.6 oz (105.7 kg)   01/03/23 240 lb (108.9 kg)     Body mass index is 34.67 kg/m². CBC:   Lab Results   Component Value Date/Time    WBC 11.9 01/20/2023 04:50 AM    RBC 3.38 01/20/2023 04:50 AM    HGB 10.6 01/20/2023 04:50 AM    HCT 32.2 01/20/2023 04:50 AM    MCV 95.1 01/20/2023 04:50 AM    RDW 14.8 01/20/2023 04:50 AM     01/20/2023 04:50 AM       CMP:   Lab Results   Component Value Date/Time     01/20/2023 04:50 AM    K 5.2 01/20/2023 04:50 AM     01/20/2023 04:50 AM    CO2 23 01/20/2023 04:50 AM    BUN 40 01/20/2023 04:50 AM    CREATININE 1.7 01/20/2023 04:50 AM    GFRAA 54 08/20/2021 10:41 AM    GFRAA >60 01/31/2012 08:37 AM    AGRATIO 1.2 01/20/2023 04:50 AM    LABGLOM 40 01/20/2023 04:50 AM    GLUCOSE 237 01/20/2023 04:50 AM    PROT 5.9 01/20/2023 04:50 AM    PROT 6.3 01/31/2012 08:37 AM    CALCIUM 8.1 01/20/2023 04:50 AM    BILITOT 0.4 01/20/2023 04:50 AM    ALKPHOS 95 01/20/2023 04:50 AM    AST 36 01/20/2023 04:50 AM    ALT 57 01/20/2023 04:50 AM       POC Tests: No results for input(s): POCGLU, POCNA, POCK, POCCL, POCBUN, POCHEMO, POCHCT in the last 72 hours.     Coags:   Lab Results   Component Value Date/Time    PROTIME 15.1 01/20/2023 04:50 AM    INR 1.20 01/20/2023 04:50 AM    APTT 23.7 01/20/2023 04:50 AM       HCG (If Applicable): No results found for: PREGTESTUR, PREGSERUM, HCG, HCGQUANT     ABGs: No results found for: PHART, PO2ART, ULA0PVM, OLW4KYS, BEART, K6ILHHXI     Type & Screen (If Applicable):  No results found for: LABABO, LABRH    Drug/Infectious Status (If Applicable):  No results found for: HIV, HEPCAB    COVID-19 Screening (If Applicable):   Lab Results   Component Value Date/Time    COVID19 NOT DETECTED 01/07/2023 06:56 PM           Anesthesia Evaluation  Patient summary reviewed and Nursing notes reviewed  Airway: Mallampati: II          Dental: normal exam         Pulmonary:normal exam    (+) COPD:  asthma: Cardiovascular:    (+) hypertension:, CAD:, CABG/stent (S/P CABG):,                   Neuro/Psych:   Negative Neuro/Psych ROS              GI/Hepatic/Renal:   (+) PUD,           Endo/Other:    (+) hypothyroidism::., .                 Abdominal:             Vascular: negative vascular ROS. Other Findings:           Anesthesia Plan      MAC     ASA 3       Induction: intravenous. Anesthetic plan and risks discussed with patient. Plan discussed with CRNA.     Attending anesthesiologist reviewed and agrees with Preprocedure content                ROXANNE Ramos MD   1/20/2023

## 2023-01-20 NOTE — ED PROVIDER NOTES
[facility]  ED  EMERGENCY DEPARTMENT ENCOUNTER      Pt Name: Minda Bain  MRN: [id number]  [place] 1940  Date of evaluation: 1/20/2023  Provider: Rom Estrada MD    CHIEF COMPLAINT       Chief Complaint   Patient presents with    Rectal Bleeding     Patient states blood is just coming out of his butt like crazy; last time this happened the doctor told him he had a ulcer         HISTORY OF PRESENT ILLNESS   (Location/Symptom, Timing/Onset, Context/Setting, Quality, Duration, Modifying Factors, Severity)  Note limiting factors. Minda Bain is a 81 y.o. male with past medical history of hypertension, hyperlipidemia, coronary artery disease status post CABG prior gastric bypass and previous upper GI bleed secondary to duodenal ulcer here today with dark-colored stool. Patient states he had a bowel movement this morning and states \"the toilet was full of blood\". Notes it was fairly black-colored stool. He had mild nausea mild epigastric discomfort. No vomiting. No fevers or chills. No dysuria or hematuria. On aspirin but no other blood thinners. Prior history of acute blood loss anemia secondary to upper GI bleed from duodenal ulcer approximately 2 years ago. HPI    Nursing Notes were reviewed. REVIEW OF SYSTEMS    (2-9 systems for level 4, 10 or more for level 5)     Review of Systems    Please see HPI for pertinent positive and negative review of system findings. A full 10 system ROS was performed and otherwise negative.         PAST MEDICAL HISTORY     Past Medical History:   Diagnosis Date    Arthritis     Asthma     COPD    CAD (coronary artery disease)     S/P CABG    Gastric bypass status for obesity     Hyperlipidemia     Hypertension     Low kidney function     Osteoarthritis     Thyroid disease          SURGICAL HISTORY       Past Surgical History:   Procedure Laterality Date    COLECTOMY  2003    COLONOSCOPY  5/11    diverticulosis    COLONOSCOPY  5/6/2016 Colon Ulcer    COLONOSCOPY N/A 6/21/2020    COLONOSCOPY DIAGNOSTIC performed by James Desir MD [address]  2007    3 VESSEL    COSMETIC SURGERY      removed skin from stomach    FOOT SURGERY Bilateral     toenails removed    GASTRIC BYPASS SURGERY  2001    HERNIA REPAIR  4283    umbilical    NASAL SEPTUM SURGERY  1995    SHOULDER ARTHROSCOPY Right 9/8/15    rtc repair    [address]    vocal cord     UPPER GASTROINTESTINAL ENDOSCOPY N/A 6/22/2020    EGD BIOPSY performed by James Desir MD at [address] 8/19/2021    EGD ESOPHAGOGASTRODUODENOSCOPY performed by James Desir MD at [facility]       Previous Medications    ALBUTEROL SULFATE HFA (PROVENTIL;VENTOLIN;PROAIR) 108 (90 BASE) MCG/ACT INHALER    Inhale 2 puffs into the lungs every 4 hours as needed for Shortness of Breath or Wheezing    ASCORBIC ACID (VITAMIN C) 500 MG TABLET    Take 500 mg by mouth daily    ASPIRIN EC 81 MG EC TABLET    Take 1 tablet by mouth daily    BIOTIN 5000 MCG TABS    Take by mouth    CHOLECALCIFEROL (VITAMIN D-3 PO)    Take by mouth    COENZYME Q10 (COQ10 PO)    Take by mouth    CYANOCOBALAMIN (VITAMIN B-12 CR PO)    Take by mouth daily    CYCLOBENZAPRINE (FLEXERIL) 10 MG TABLET    Take 10 mg by mouth 3 times daily as needed    IRON CR PO    Take by mouth daily    LEVOTHYROXINE (SYNTHROID) 100 MCG TABLET    Take 50 mcg by mouth daily. LOVASTATIN (MEVACOR) 20 MG TABLET    Take 20 mg by mouth nightly    METOPROLOL (LOPRESSOR) 50 MG TABLET    Take 50 mg by mouth 2 times daily. MONTELUKAST (SINGULAIR) 10 MG TABLET    Take 10 mg by mouth nightly. MULTIPLE VITAMINS-MINERALS (THERAPEUTIC MULTIVITAMIN-MINERALS) TABLET    Take 1 tablet by mouth daily    NITROGLYCERIN (NITROSTAT) 0.4 MG SL TABLET    Place 0.4 mg under the tongue every 5 minutes as needed.       PANTOPRAZOLE (PROTONIX) 40 MG TABLET Take 1 tablet by mouth 2 times daily (before meals)    VITAMIN D 25 MCG (1000 UT) CAPS    Take 1,000 Units by mouth daily       ALLERGIES     Mixed ragweed, Lovastatin, Omeprazole, Seasonal, Clindamycin hcl, and Pravastatin    FAMILY HISTORY       Family History   Problem Relation Age of Onset    Cancer Maternal Grandmother           SOCIAL HISTORY       Social History     Socioeconomic History    Marital status:      Spouse name: None    Number of children: None    Years of education: None    Highest education level: None   Tobacco Use    Smoking status: Former     Packs/day: 1.00     Years: 20.00     Pack years: 20.00     Types: Cigarettes    Smokeless tobacco: Never    Tobacco comments:     plans on quitting 9/2016 when he gets a total knee   Substance and Sexual Activity    Alcohol use: No     Comment: pt poor historian       SCREENINGS    Osman Coma Scale  Eye Opening: Spontaneous  Best Verbal Response: Oriented  Best Motor Response: Obeys commands  Milton Coma Scale Score: 15          PHYSICAL EXAM    (up to 7 for level 4, 8 or more for level 5)     ED Triage Vitals [01/20/23 0444]   BP Temp Temp Source Heart Rate Resp SpO2 Height Weight   114/74 98 °F (36.7 °C) Oral 92 16 96 % 5' 8\" (1.727 m) 228 lb (103.4 kg)       Physical Exam    General appearance:  Cooperative. Somewhat pale in appearance but overall in no acute distress. Skin:  Warm. Dry. Slightly pale skin  Eye:  Extraocular movements intact. Ears, nose, mouth and throat:  Oral mucosa moist,  Neck:  Trachea midline. Heart:  Regular rate and rhythm  Perfusion:  intact  Respiratory:  Respirations nonlabored. Lungs clear to auscultation bilaterally. Abdominal:   Non distended. Mild epigastric tenderness with no rebound or guarding. Overall soft abdomen. Rectal exam: Mildly stenotic anus, black/maroon-colored stool in the vault  Neurological:  Alert and oriented x 3.   Moves all extremities spontaneously  Musculoskeletal: Normal ROM, no deformities          Psychiatric:  Normal mood      DIAGNOSTIC RESULTS       Labs Reviewed   CBC WITH AUTO DIFFERENTIAL - Abnormal; Notable for the following components:       Result Value    WBC 11.9 (*)     RBC 3.38 (*)     Hemoglobin 10.6 (*)     Hematocrit 32.2 (*)     Neutrophils Absolute 10.1 (*)     All other components within normal limits   COMPREHENSIVE METABOLIC PANEL W/ REFLEX TO MG FOR LOW K - Abnormal; Notable for the following components:    Potassium reflex Magnesium 5.2 (*)     Glucose 237 (*)     BUN 40 (*)     Creatinine 1.7 (*)     Est, Glom Filt Rate 40 (*)     Calcium 8.1 (*)     Total Protein 5.9 (*)     Albumin 3.2 (*)     ALT 57 (*)     All other components within normal limits   PROTIME-INR - Abnormal; Notable for the following components:    Protime 15.1 (*)     INR 1.20 (*)     All other components within normal limits   BLOOD OCCULT STOOL SCREEN #1 - Abnormal; Notable for the following components:    Occult Blood Screening   (*)     Value: Result: POSITIVE  Normal range: Negative      All other components within normal limits    Narrative:     ORDER#: [id number]                          ORDERED BY: KWADWO MCINTOSH  SOURCE: Stool                              COLLECTED:  01/20/23 04:50  ANTIBIOTICS AT [facility]ANN[facility].:                      RECEIVED :  01/20/23 05:04   APTT   TYPE AND SCREEN       Interpretation per the Radiologist below, if obtained/available at the time of this note:    No orders to display       All other labs/imaging were within normal range or not returned as of this dictation. EMERGENCY DEPARTMENT COURSE and DIFFERENTIAL DIAGNOSIS/MDM:   Vitals:        Differential Diagnosis: Upper GI bleed, lower GI bleed, duodenal ulcer, gastritis, diverticular bleed    Patient presents the emergency department today complaining of dark black stool. Prior history of GI bleed secondary to duodenal ulcer. On aspirin.   Recent admission to the hospital and was on a course of steroids which may be the underlying trigger to his symptoms at this time. Abdomen soft here. Vital signs stable. Rectal exam with obvious melena confirmed by guaiac testing. Hemoglobin 10 down from most recent of 12 performed only 11 days ago. Received IV Protonix. Gastroenterology consulted and will evaluate the patient earlier this morning. Will admit to the hospital for further management    Medications and Route:   Medications   pantoprazole (PROTONIX) injection 80 mg (80 mg IntraVENous Given 1/20/23 0500)       History From: Patient         Chronic Conditions: Noted in HPI    CONSULTS: (Who and What was discussed)  IP CONSULT TO GI  IP CONSULT TO HOSPITALIST        Records Reviewed : Inpatient Notes review of most recent discharge summary earlier this month as well as prior discharge summary and EGD note from 2021    Disposition Considerations (Tests not ordered but considered, Shared Decision Making, Pt Expectation of Test or Tx.):     Carlos Alberto Mendoza M.D., am the primary clinician of record. MDM      CONSULTS     IP CONSULT TO GI  IP CONSULT TO HOSPITALIST    Critical Care:   None    REASSESSMENT          PROCEDURE     Unless otherwise noted below, none     Procedures      FINAL IMPRESSION      1. UGIB (upper gastrointestinal bleed)            DISPOSITION/PLAN   DISPOSITION Decision To Admit 01/20/2023 04:51:01 AM        PATIENT REFERRED TO:  No follow-up provider specified. DISCHARGE MEDICATIONS:  New Prescriptions    No medications on file     Controlled Substances Monitoring:     RX Monitoring 7/31/2015   Attestation The Prescription Monitoring Report for this patient was reviewed today. Periodic Controlled Substance Monitoring No signs of potential drug abuse or diversion identified.        (Please note that portions of this note were completed with a voice recognition program.  Efforts were made to edit the dictations but occasionally words are mis-transcribed.)    Renee Gimenez MD (electronically signed)  Attending Emergency Physician            Areli Valdez MD  01/20/23 1512

## 2023-01-20 NOTE — PROGRESS NOTES
Transferred to inpatient hospital room 540 via gurney accompanied by transport staff  Pt alert and oriented x4  Calm/appropriate  VSS  Iv site assessed without evidence of infiltration on arrival  Respiration  easy unlabored - auscultated -clear bilaterally anterior and posterior fields  Abd soft nondistended : BS+x4 quadrants : no nausea   Oxygen tank checked for  adequate volume before transport

## 2023-01-20 NOTE — ANESTHESIA POSTPROCEDURE EVALUATION
Department of Anesthesiology  Postprocedure Note    Patient: Juana Bee  MRN: [id number]  YOB: 1940  Date of evaluation: 1/20/2023      Procedure Summary     Date: 01/20/23 Room / Location: [address] [place] [address]    Anesthesia Start: 2791 Anesthesia Stop: 9470    Procedure: EGD BIOPSY Diagnosis:       Gastrointestinal hemorrhage, unspecified gastrointestinal hemorrhage type      (GI Bleed)    Surgeons: Iona Morales MD Responsible Provider: Cristian Melvin MD    Anesthesia Type: MAC ASA Status: 3          Anesthesia Type: No value filed.     Soren Phase I: Soren Score: 10    Soren Phase II:        Anesthesia Post Evaluation    Patient location during evaluation: PACU  Patient participation: complete - patient participated  Level of consciousness: awake  Pain score: 0  Airway patency: patent  Nausea & Vomiting: no nausea  Complications: no  Cardiovascular status: blood pressure returned to baseline  Respiratory status: acceptable  Hydration status: euvolemic

## 2023-01-20 NOTE — H&P
Hospital Medicine  History and Physical    PCP: Prabha Arguelles MD  Patient Name: Thu Nieves    Date of Service: Pt seen/examined on 1/20/23 and admitted to Inpatient with expected LOS greater than two midnights due to medical therapy    CHIEF COMPLAINT:  Pt c/o black/maroon colored stools  HISTORY OF PRESENT ILLNESS: Pt is an 81y.o. year-old male with a history of hypertension, hyperlipidemia, CAD, COPD, CKD3, acquired hypothyroid and a prior upper GI bled due to a duodenal ulcer in 2021. He was recently hospitalized for a COPD exacerbation and received high-dose steroids at that time. He presents to the emergency room for evaluation following an acute onset of black and maroon-colored stools along with nausea which began this morning. He denies having any emesis. He denies seeing ellen blood per rectum. In the emergency room he was found to have stool with positive occult blood. His hemoglobin has dropped 2 g over the past 11 days. Associated signs and symptoms do not include fever or chills, vomiting, diarrhea, hematemesis, sweats, dark urine or jaundice. He was given Protonix and is being admitted for further evaluation and treatment.       Past Medical History:        Diagnosis Date    Arthritis     Asthma     COPD    CAD (coronary artery disease)     S/P CABG    Gastric bypass status for obesity     Hyperlipidemia     Hypertension     Low kidney function     Osteoarthritis     Thyroid disease        Past Surgical History:        Procedure Laterality Date    COLECTOMY  2003    COLONOSCOPY  5/11    diverticulosis    COLONOSCOPY  5/6/2016    Colon Ulcer    COLONOSCOPY N/A 6/21/2020    COLONOSCOPY DIAGNOSTIC performed by Carlo Garcia MD at [address] [place] [address]  2007    3 VESSEL    COSMETIC SURGERY      removed skin from stomach    FOOT SURGERY Bilateral     toenails removed    GASTRIC BYPASS SURGERY  2001    HERNIA REPAIR  1994    umbilical    NASAL SEPTUM SURGERY 1995    SHOULDER ARTHROSCOPY Right 9/8/15    [facility] repair    THROAT SURGERY  1995    vocal cord     UPPER GASTROINTESTINAL ENDOSCOPY N/A 6/22/2020    EGD BIOPSY performed by Corrine Huerta MD at [address] 8/19/2021    EGD ESOPHAGOGASTRODUODENOSCOPY performed by Corrine Huerta MD at [facility]:  Mixed ragweed, Lovastatin, Omeprazole, Seasonal, Clindamycin hcl, and Pravastatin    Medications Prior to Admission:    Prior to Admission medications    Medication Sig Start Date End Date Taking?  Authorizing Provider   albuterol sulfate HFA (PROVENTIL;VENTOLIN;PROAIR) 108 (90 Base) MCG/ACT inhaler Inhale 2 puffs into the lungs every 4 hours as needed for Shortness of Breath or Wheezing 1/9/23   George Bello MD   vitamin D 25 MCG (1000 UT) CAPS Take 1,000 Units by mouth daily    Historical Provider, MD   cyclobenzaprine (FLEXERIL) 10 MG tablet Take 10 mg by mouth 3 times daily as needed 10/12/21   Historical Provider, MD   pantoprazole (PROTONIX) 40 MG tablet Take 1 tablet by mouth 2 times daily (before meals) 8/20/21 9/19/21  Demetrius Silver MD   aspirin EC 81 MG EC tablet Take 1 tablet by mouth daily 6/23/20   Yash Whitney MD   Cholecalciferol (VITAMIN D-3 PO) Take by mouth    Historical Provider, MD   Coenzyme Q10 (COQ10 PO) Take by mouth    Historical Provider, MD   Biotin 5000 MCG TABS Take by mouth    Historical Provider, MD   lovastatin (MEVACOR) 20 MG tablet Take 20 mg by mouth nightly    Historical Provider, MD   IRON CR PO Take by mouth daily    Historical Provider, MD   Multiple Vitamins-Minerals (THERAPEUTIC MULTIVITAMIN-MINERALS) tablet Take 1 tablet by mouth daily    Historical Provider, MD   Cyanocobalamin (VITAMIN B-12 CR PO) Take by mouth daily    Historical Provider, MD   Ascorbic Acid (VITAMIN C) 500 MG tablet Take 500 mg by mouth daily    Historical Provider, MD   nitroGLYCERIN (NITROSTAT) 0.4 MG SL tablet Place 0.4 mg under the tongue every 5 minutes as needed. Historical Provider, MD   metoprolol (LOPRESSOR) 50 MG tablet Take 50 mg by mouth 2 times daily. Historical Provider, MD   levothyroxine (SYNTHROID) 100 MCG tablet Take 50 mcg by mouth daily. Historical Provider, MD   montelukast (SINGULAIR) 10 MG tablet Take 10 mg by mouth nightly. Historical Provider, MD       Family History:       Problem Relation Age of Onset    Cancer Maternal Grandmother      Social History:   TOBACCO:   reports that he has quit smoking. His smoking use included cigarettes. He has a 20.00 pack-year smoking history. He has never used smokeless tobacco.  ETOH:   reports no history of alcohol use. OCCUPATION:      REVIEW OF SYSTEMS:  A full review of systems was performed and is negative except for that which appears in the HPI    Physical Exam:    Vitals: /74   Pulse 92   Temp 98 °F (36.7 °C) (Oral)   Resp 16   Ht 5' 8\" (1.727 m)   Wt 228 lb (103.4 kg)   SpO2 96%   BMI 34.67 kg/m²   General appearance: WD/WN 81y.o. year-old male who is alert, appears stated age and is cooperative  HEENT: Head: Normocephalic, no lesions, without obvious abnormality. Eye: Normal external eye, conjunctiva, lids cornea, PEERL. Ears: Normal external ears. Non-tender. Nose: Normal external nose, mucus membranes and septum. Pharynx: Dental Hygiene adequate. Normal buccal mucosa. Normal pharynx. Neck: no adenopathy, no carotid bruit, no JVD, supple, symmetrical, trachea midline and thyroid not enlarged, symmetric, no tenderness/mass/nodules  Lungs: clear to auscultation bilaterally and no use of accessory muscles  Heart: regular rate and rhythm, S1, S2 normal, no murmur, click, rub or gallop and normal apical impulse  Abdomen: mild epigastric TTP. Otherwise bowel sounds normal; no masses, no organomegaly  Extremities: extremities atraumatic, no cyanosis or edema and Homans sign is negative, no sign of DVT.     Capillary Refill: Acceptable < 3 seconds   Peripheral Pulses: +3 easily felt, not easily obliterated with pressures   Skin: Skin color, texture, turgor normal. No rashes or lesions on exposed skin  Neurologic: Neurovascularly intact without any focal sensory/motor deficits. Cranial nerves: II-XII intact, grossly non-focal. Gait was not tested. Mental Status: Alert and oriented, thought content appropriate, normal insight        CBC:   Recent Labs     01/20/23  0450   WBC 11.9*   HGB 10.6*        BMP:    Recent Labs     01/20/23  0450      K 5.2*      CO2 23   BUN 40*   CREATININE 1.7*   GLUCOSE 237*     Troponin: No results for input(s): TROPONINI in the last 72 hours. PT/INR:  No results found for: PTINR  U/A:    Lab Results   Component Value Date/Time    LEUKOCYTESUR Negative 01/07/2023 09:38 PM    RBCUA 3-4 01/07/2023 09:38 PM    SPECGRAV 1.010 01/07/2023 09:38 PM    UROBILINOGEN 0.2 01/07/2023 09:38 PM    BILIRUBINUR Negative 01/07/2023 09:38 PM    BILIRUBINUR NEGATIVE 01/31/2012 08:33 AM    BLOODU TRACE-INTACT 01/07/2023 09:38 PM    GLUCOSEU Negative 01/07/2023 09:38 PM    GLUCOSEU NEGATIVE 01/31/2012 08:33 AM    PROTEINU TRACE 01/07/2023 09:38 PM         RAD:   I have independently reviewed and interpreted the imaging studies below and based my recommendations to the patient on those findings. XR CHEST (2 VW)    Result Date: 1/7/2023  EXAMINATION: TWO XRAY VIEWS OF THE CHEST 1/7/2023 6:02 pm COMPARISON: 07/09/2021 HISTORY: ORDERING SYSTEM PROVIDED HISTORY: Hypoxia, hx of COPD TECHNOLOGIST PROVIDED HISTORY: Reason for exam:->Hypoxia, hx of COPD Reason for Exam: hypoxia, hx of COPD FINDINGS: Status post median sternotomy. The lungs are without acute focal process. Bibasilar hypoaeration. There is no effusion or pneumothorax. The cardiomediastinal silhouette is stable. The osseous structures are stable. No acute process.  Bibasilar hypoaeration     CT ABDOMEN PELVIS W IV CONTRAST Additional Contrast? None    Result Date: 1/7/2023  EXAMINATION: CTA OF THE CHEST; CT OF THE ABDOMEN AND PELVIS WITH CONTRAST 1/7/2023 7:24 pm TECHNIQUE: CTA of the chest was performed after the administration of intravenous contrast.  Multiplanar reformatted images are provided for review. MIP images are provided for review. Automated exposure control, iterative reconstruction, and/or weight based adjustment of the mA/kV was utilized to reduce the radiation dose to as low as reasonably achievable.; CT of the abdomen and pelvis was performed with the administration of intravenous contrast. Multiplanar reformatted images are provided for review. Automated exposure control, iterative reconstruction, and/or weight based adjustment of the mA/kV was utilized to reduce the radiation dose to as low as reasonably achievable. COMPARISON: 01/03/2003 HISTORY: ORDERING SYSTEM PROVIDED HISTORY: Hypoxia, shortness of breath, weakness TECHNOLOGIST PROVIDED HISTORY: Reason for exam:->Hypoxia, shortness of breath, weakness Decision Support Exception - unselect if not a suspected or confirmed emergency medical condition->Emergency Medical Condition (MA) Reason for Exam: SOB weakness Relevant Medical/Surgical History: Smoker for 45 years, dx COPD FINDINGS: CTA chest: Pulmonary Arteries: Pulmonary arteries are adequately opacified for evaluation. No evidence of intraluminal filling defect to suggest pulmonary embolism. Main pulmonary artery is normal in caliber. Motion artifact partially obscuring the distal pulmonary arteries along the lung bases posteriorly. Mediastinum: There are postop changes along the mediastinum and sternum. No evidence of mediastinal lymphadenopathy. The heart and pericardium demonstrate no acute abnormality. There are extensive calcifications along the coronary arteries. There are calcified lymph nodes along the subcarinal region. Lungs/pleura:  There is motion artifact along the lung bases limiting the exam.  The lungs are hyperinflated and emphysematous. There is hazy ground-glass and interstitial opacities along the lung bases posteriorly with mild air bronchograms throughout and associated mild bronchiectasis. There is no pulmonary nodule or mass. There are small bibasilar pleural effusions layering posteriorly which is more prominent on the left. CT Abdomen and Pelvis: The liver and spleen are normal size. The gallbladder has been removed with clips in the gallbladder fossa. The common duct is mildly dilated proximally and tapers distally to the ampulla which is unchanged with no filling defect. The pancreas and adrenals are normal.  The kidneys are normal size and function normally with no hydronephrosis or renal stones. The ureters are normal caliber. There are surgical clips along the EG junction and sutures along the proximal stomach extending into the small bowel which is unchanged. There is no bowel obstruction. There surgical clips along the anterior abdomen extending inferiorly. The appendix is not well visualized. The colon is normal caliber. The small bowel is unremarkable. There are diverticula throughout the left colon with no pericolonic inflammation. The bladder is unremarkable. The prostate gland is top-normal.  No adenopathy or ascites is seen. The mesentery is unremarkable. There is calcified plaque throughout the aorta with no aneurysm and no retroperitoneal mass or adenopathy. The bones are intact. No aggressive osseous lesion is seen. Motion artifact limiting the exam along the lung bases. Within the limitations of the exam, no obvious acute pulmonary embolus can be seen. Moderate bibasilar atelectasis and/or infiltrates versus scarring and associated tiny bibasilar pleural effusions which is more prominent on the left. Recommend follow-up with serial chest x-rays. Mildly dilated atherosclerotic thoracic aorta with no aneurysm or dissection. Extensive coronary artery calcifications.   Old healed granulomatous disease in the mediastinum Previous cholecystectomy and postop changes along the upper abdomen no acute abnormality seen. Previous gastric bypass surgery which is unchanged with no bowel obstruction Moderate diverticulosis throughout the left colon with no pericolonic inflammation. CT ABDOMEN PELVIS W IV CONTRAST Additional Contrast? None    Result Date: 1/3/2023  EXAMINATION: CT OF THE ABDOMEN AND PELVIS WITH CONTRAST 1/3/2023 1:45 pm TECHNIQUE: CT of the abdomen and pelvis was performed with the administration of intravenous contrast. Multiplanar reformatted images are provided for review. Automated exposure control, iterative reconstruction, and/or weight based adjustment of the mA/kV was utilized to reduce the radiation dose to as low as reasonably achievable. COMPARISON: 05/02/2011 HISTORY: ORDERING SYSTEM PROVIDED HISTORY: epigastric abd pain TECHNOLOGIST PROVIDED HISTORY: Additional Contrast?->None Reason for exam:->epigastric abd pain Decision Support Exception - unselect if not a suspected or confirmed emergency medical condition->Emergency Medical Condition (MA) Reason for Exam: epigastric pain x 2 days-no appetite Relevant Medical/Surgical History: bariatric surg-umbilical hernia repair FINDINGS: Lower Chest: Mild bibasilar bronchiectatic changes are noted along with mild chronic pleuroparenchymal scarring. The pleural spaces are clear. Organs: The liver, pancreas, spleen, kidneys and adrenals are unremarkable. The gallbladder is surgically absent. GI/Bowel: There is no bowel dilatation, wall thickening or obstruction. Diverticular changes are scattered throughout the left hemicolon. Postop changes of gastric bypass are noted. Pelvis: The bladder and prostate are unremarkable. Peritoneum/Retroperitoneum: There is no free air, free fluid or intraperitoneal inflammatory change. There is no adenopathy. Postop changes of ventral abdominal hernia repair are present.  Bones/Soft Tissues: There is no acute fracture or aggressive osseous lesion. Diverticulosis. CT CHEST PULMONARY EMBOLISM W CONTRAST    Result Date: 1/7/2023  EXAMINATION: CTA OF THE CHEST; CT OF THE ABDOMEN AND PELVIS WITH CONTRAST 1/7/2023 7:24 pm TECHNIQUE: CTA of the chest was performed after the administration of intravenous contrast.  Multiplanar reformatted images are provided for review. MIP images are provided for review. Automated exposure control, iterative reconstruction, and/or weight based adjustment of the mA/kV was utilized to reduce the radiation dose to as low as reasonably achievable.; CT of the abdomen and pelvis was performed with the administration of intravenous contrast. Multiplanar reformatted images are provided for review. Automated exposure control, iterative reconstruction, and/or weight based adjustment of the mA/kV was utilized to reduce the radiation dose to as low as reasonably achievable. COMPARISON: 01/03/2003 HISTORY: ORDERING SYSTEM PROVIDED HISTORY: Hypoxia, shortness of breath, weakness TECHNOLOGIST PROVIDED HISTORY: Reason for exam:->Hypoxia, shortness of breath, weakness Decision Support Exception - unselect if not a suspected or confirmed emergency medical condition->Emergency Medical Condition (MA) Reason for Exam: SOB weakness Relevant Medical/Surgical History: Smoker for 45 years, dx COPD FINDINGS: CTA chest: Pulmonary Arteries: Pulmonary arteries are adequately opacified for evaluation. No evidence of intraluminal filling defect to suggest pulmonary embolism. Main pulmonary artery is normal in caliber. Motion artifact partially obscuring the distal pulmonary arteries along the lung bases posteriorly. Mediastinum: There are postop changes along the mediastinum and sternum. No evidence of mediastinal lymphadenopathy. The heart and pericardium demonstrate no acute abnormality. There are extensive calcifications along the coronary arteries.   There are calcified lymph nodes along the subcarinal region. Lungs/pleura: There is motion artifact along the lung bases limiting the exam.  The lungs are hyperinflated and emphysematous. There is hazy ground-glass and interstitial opacities along the lung bases posteriorly with mild air bronchograms throughout and associated mild bronchiectasis. There is no pulmonary nodule or mass. There are small bibasilar pleural effusions layering posteriorly which is more prominent on the left. CT Abdomen and Pelvis: The liver and spleen are normal size. The gallbladder has been removed with clips in the gallbladder fossa. The common duct is mildly dilated proximally and tapers distally to the ampulla which is unchanged with no filling defect. The pancreas and adrenals are normal.  The kidneys are normal size and function normally with no hydronephrosis or renal stones. The ureters are normal caliber. There are surgical clips along the EG junction and sutures along the proximal stomach extending into the small bowel which is unchanged. There is no bowel obstruction. There surgical clips along the anterior abdomen extending inferiorly. The appendix is not well visualized. The colon is normal caliber. The small bowel is unremarkable. There are diverticula throughout the left colon with no pericolonic inflammation. The bladder is unremarkable. The prostate gland is top-normal.  No adenopathy or ascites is seen. The mesentery is unremarkable. There is calcified plaque throughout the aorta with no aneurysm and no retroperitoneal mass or adenopathy. The bones are intact. No aggressive osseous lesion is seen. Motion artifact limiting the exam along the lung bases. Within the limitations of the exam, no obvious acute pulmonary embolus can be seen. Moderate bibasilar atelectasis and/or infiltrates versus scarring and associated tiny bibasilar pleural effusions which is more prominent on the left. Recommend follow-up with serial chest x-rays.  Mildly dilated atherosclerotic thoracic aorta with no aneurysm or dissection. Extensive coronary artery calcifications. Old healed granulomatous disease in the mediastinum Previous cholecystectomy and postop changes along the upper abdomen no acute abnormality seen. Previous gastric bypass surgery which is unchanged with no bowel obstruction Moderate diverticulosis throughout the left colon with no pericolonic inflammation. Assessment:   Principal Problem:    Upper GI bleed  Active Problems:    COPD (chronic obstructive pulmonary disease) (HCC)    Acute blood loss anemia    Hyperglycemia    Nausea    Acute renal failure superimposed on stage 3 chronic kidney disease (HCC)    Essential hypertension    CAD (coronary artery disease)    Hyperlipidemia    Acquired hypothyroidism  Resolved Problems:    * No resolved hospital problems. *      Plan:       Upper GI bleed - Pt made NPO. Protonix started. GI consulted  - He had a duodenal bleed in 2021  -He was recently treated with high-dose steroids for a COPD exacerbation    Acute blood loss anemia -patient has had a two-point drop in his hemoglobin over the past 11 days. He does not require transfusion at this time. We will monitor his hemoglobin closely and transfuse as necessary to maintain a hemoglobin of 8.0 or higher. Hyperglycemia - Pt does not have a history of Diabetes Mellitus. Check a HgbA1c to evaluate for chronicity, and start sliding scale insulin and a carbohydrate controlled diet     Nausea (no emesis) - Will provide symptomatic treatment with Zofran and/or Phenergan as needed, maintenance of fluids and electrolytes. Acute renal failure superimposed on stage 3 chronic kidney disease (HCC) - appears prerenal. Will start IV fluids    COPD (chronic obstructive pulmonary disease) ([facility] [address] -without acute exacerbation. Continue home medications and provide breathing treatments as indicated. CAD (coronary artery disease) - currently stable.  Pt denies chest pain. Continue beta blocker, statin and hold aspirin due to GI bleed    Acquired hypothyroidism - stable; continue Levothyroxine    Essential (primary) hypertension - continue home meds and monitor blood pressure    Hyperlipidemia - No current evidence of Rhabdomyolysis or other adverse effects. Continue statin therapy while in the hospital    Non-morbid obesity due to excess calories (Body mass index is 34.67 kg/m². ) - Complicating assessment and treatment. Placing patient at risk for multiple co-morbidities as well as early death and contributing to the patient's presentation. Code Status: Full  Diet: NPO  DVT Prophylaxis: SCDs    (Advanced care planning has been discussed with patient and/or responsible family member and is reflected in the code status.  Further orders associated with this have been entered if appropriate)    Disposition: Anticipate that patient will remain in the hospital for 2 or more midnights depending on further evaluation and clinical course     Please note that over 50 minutes was spent in evaluating the patient, review of records and results, discussion with staff/family, etc.      Racquel Harding MD

## 2023-01-20 NOTE — RT PROTOCOL NOTE
RT Inhaler-Nebulizer Bronchodilator Protocol Note    There is a bronchodilator order in the chart from a provider indicating to follow the RT Bronchodilator Protocol and there is an Initiate RT Inhaler-Nebulizer Bronchodilator Protocol order as well (see protocol at bottom of note). CXR Findings:  No results found. The findings from the last RT Protocol Assessment were as follows:   History Pulmonary Disease: Chronic pulmonary disease  Respiratory Pattern: Regular pattern and RR 12-20 bpm  Breath Sounds: Clear breath sounds  Cough: Strong, spontaneous, non-productive  Indication for Bronchodilator Therapy: On home bronchodilators  Bronchodilator Assessment Score: 2    Aerosolized bronchodilator medication orders have been revised according to the RT Inhaler-Nebulizer Bronchodilator Protocol below. Respiratory Therapist to perform RT Therapy Protocol Assessment initially then follow the protocol. Repeat RT Therapy Protocol Assessment PRN for score 0-3 or on second treatment, BID, and PRN for scores above 3. No Indications - adjust the frequency to every 6 hours PRN wheezing or bronchospasm, if no treatments needed after 48 hours then discontinue using Per Protocol order mode. If indication present, adjust the RT bronchodilator orders based on the Bronchodilator Assessment Score as indicated below. Use Inhaler orders unless patient has one or more of the following: on home nebulizer, not able to hold breath for 10 seconds, is not alert and oriented, cannot activate and use MDI correctly, or respiratory rate 25 breaths per minute or more, then use the equivalent nebulizer order(s) with same Frequency and PRN reasons based on the score. If a patient is on this medication at home then do not decrease Frequency below that used at home.     0-3 - enter or revise RT bronchodilator order(s) to equivalent RT Bronchodilator order with Frequency of every 4 hours PRN for wheezing or increased work of breathing using Per Protocol order mode. 4-6 - enter or revise RT Bronchodilator order(s) to two equivalent RT bronchodilator orders with one order with BID Frequency and one order with Frequency of every 4 hours PRN wheezing or increased work of breathing using Per Protocol order mode. 7-10 - enter or revise RT Bronchodilator order(s) to two equivalent RT bronchodilator orders with one order with TID Frequency and one order with Frequency of every 4 hours PRN wheezing or increased work of breathing using Per Protocol order mode. 11-13 - enter or revise RT Bronchodilator order(s) to one equivalent RT bronchodilator order with QID Frequency and an Albuterol order with Frequency of every 4 hours PRN wheezing or increased work of breathing using Per Protocol order mode. Greater than 13 - enter or revise RT Bronchodilator order(s) to one equivalent RT bronchodilator order with every 4 hours Frequency and an Albuterol order with Frequency of every 2 hours PRN wheezing or increased work of breathing using Per Protocol order mode. RT to enter RT Home Evaluation for COPD & MDI Assessment order using Per Protocol order mode.     Electronically signed by Roberta Ahuja RCP on 1/20/2023 at 10:59 AM

## 2023-01-20 NOTE — CARE COORDINATION
CASE MANAGEMENT INITIAL ASSESSMENT      Reviewed chart and completed assessment with patient:Bedside  Family present: none  Explained Case Management role/services. yes    Primary contact information:Arcadio    Health Care Decision Maker :   Primary Decision Maker: Shira Mancera - [phone]    Secondary Decision Maker: Pete Do - Tyshawn - [phone]          Can this person be reached and be able to respond quickly, such as within a few minutes or hours? Yes      Admit date/status:1/20/23/ Inp  Diagnosis:Upper GI bleed   Is this a Readmission?:  Yes    Readmission Screening completed?: Yes     Insurance:[employer]/ Medicare   Precert required for SNF: Yes       3 night stay required: No    Living arrangements, Adls, care needs, prior to admission:Lives with spouse and son. IPTA    Durable Medical Equipment at home:  X BSC__Shower Chair__  02__ HHN__ CPAP__  BiPap__  Hospital Bed__ W/C___ Other__X rollator___    Services in the home and/or outpatient, prior to admission:[employer] for O2, he is active (patient wants a cart with wheels and a handle to make it easier) [employer] working on that. Current PCP:Ayo Zarate                  Medications: Prescription coverage? Yes     Transportation needs: none         PT/OT recs:none    Hospital Exemption Notification (HEN):needed for SNF    Barriers to discharge:none    Plan/comments:Patient plans to return home with his spouse and his adult son. Patient was IPTA and does all ADL's. Patient drives and has no issues with getting to medical appts, and getting medications. [employer] supplies his home O2. Will continue to follow.       Keaton Willard RN

## 2023-01-20 NOTE — CONSULTS
Placed a call to GI @ [phone]  RE: [facility]: [facility] per Carla Gandhi MD  RE-paged Dr. Phil Anderson @ 2022  Dr. Phil Anderson called back @ [phone]

## 2023-01-20 NOTE — PROGRESS NOTES
Patient discharged home per MD order. Patient verbalizes understanding of all discharge instructions. All belongings with patient at discharge. IV removed intact.

## 2023-01-20 NOTE — H&P
Pre-sedation Assessment    History and Physical / Pre-Sedation Assessment  Patient:  Meaghan Keller   :   1940     Intended Procedure: EGD      HPI:   81y.o. year-old male with a history of hypertension, hyperlipidemia, CAD, COPD, CKD3, acquired hypothyroid and a prior upper GI bled due to a duodenal ulcer in . He was recently hospitalized for a COPD exacerbation and received high-dose steroids at that time. He presents to the emergency room for evaluation following an acute onset of black and maroon-colored stools along with nausea which began this morning. He denies having any emesis.      Current Facility-Administered Medications   Medication Dose Route Frequency Provider Last Rate Last Admin    montelukast (SINGULAIR) tablet 10 mg  10 mg Oral Nightly Emily Magallanes MD        metoprolol tartrate (LOPRESSOR) tablet 50 mg  50 mg Oral BID Emily Magallanes MD        atorvastatin (LIPITOR) tablet 10 mg  10 mg Oral Daily Emily Magallanes MD        levothyroxine (SYNTHROID) tablet 100 mcg  100 mcg Oral QAM AC Emily Magallanes MD        albuterol sulfate HFA (PROVENTIL;VENTOLIN;PROAIR) 108 (90 Base) MCG/ACT inhaler 2 puff  2 puff Inhalation Q4H PRN Emily Magallanes MD        sodium chloride flush 0.9 % injection 10 mL  10 mL IntraVENous 2 times per day Emily Magallanes MD        sodium chloride flush 0.9 % injection 10 mL  10 mL IntraVENous PRN Emily Magallanes MD        0.9 % sodium chloride infusion   IntraVENous PRN Emily Magallanes MD        polyethylene glycol (GLYCOLAX) packet 17 g  17 g Oral Daily PRN Emily Magallanes MD        acetaminophen (TYLENOL) tablet 650 mg  650 mg Oral Q4H PRN Emily Magallanes MD        Or    acetaminophen (TYLENOL) suppository 650 mg  650 mg Rectal Q4H PRN Emily Magallanes MD        0.9 % sodium chloride infusion   IntraVENous Continuous Emily Magallanes MD        glucose chewable tablet 16 g  4 tablet Oral PRN Emily Magallanes MD        dextrose bolus 10% 125 mL  125 mL IntraVENous PRN Pepe Haro MD        Or    dextrose bolus 10% 250 mL  250 mL IntraVENous PRN Pepe Haro MD        glucagon (rDNA) injection 1 mg  1 mg SubCUTAneous PRN Pepe Haro MD        dextrose 10 % infusion   IntraVENous Continuous PRN Pepe Haro MD        insulin lispro (HUMALOG) injection vial 0-8 Units  0-8 Units SubCUTAneous Q4H Pepe Haro MD        pantoprazole (PROTONIX) injection 40 mg  40 mg IntraVENous Q12H Pepe Haro MD        oxyCODONE (ROXICODONE) immediate release tablet 5 mg  5 mg Oral Q4H PRN Pepe Haro MD        Or    oxyCODONE (ROXICODONE) immediate release tablet 10 mg  10 mg Oral Q4H PRN Pepe Haro MD   10 mg at 01/20/23 8486    lactated ringers IV soln infusion 1,000 mL  1,000 mL IntraVENous Continuous Jerome Bright MD         Past Medical History:   Diagnosis Date    Arthritis     Asthma     COPD    CAD (coronary artery disease)     S/P CABG    Gastric bypass status for obesity     Hyperlipidemia     Hypertension     Low kidney function     Osteoarthritis     Thyroid disease      Past Surgical History:   Procedure Laterality Date    COLECTOMY  2003    COLONOSCOPY  5/11    diverticulosis    COLONOSCOPY  5/6/2016    Colon Ulcer    COLONOSCOPY N/A 6/21/2020    COLONOSCOPY DIAGNOSTIC performed by Jerome Bright MD at [address] [place] [address]    3 VESSEL    COSMETIC SURGERY      removed skin from stomach    FOOT SURGERY Bilateral     toenails removed    GASTRIC BYPASS SURGERY  2001    HERNIA REPAIR  6915    umbilical    NASAL SEPTUM SURGERY  1995    SHOULDER ARTHROSCOPY Right 9/8/15    rtc repair    THROAT SURGERY  1995    vocal cord     UPPER GASTROINTESTINAL ENDOSCOPY N/A 6/22/2020    EGD BIOPSY performed by Jerome Bright MD at [facility] N/A 8/19/2021    EGD ESOPHAGOGASTRODUODENOSCOPY performed by Jerome Bright MD at [place] [address] notes reviewed and agreed. Medications reviewed  Allergies: Allergies   Allergen Reactions    Mixed Ragweed Shortness Of Breath     Pollen  Pollen    Lovastatin Itching    Omeprazole Dizziness or Vertigo    Seasonal      Pollen    Clindamycin Hcl Rash    Pravastatin Rash     Itch?  itching             Physical Exam:  Vital Signs: /63   Pulse 69   Temp 97.8 °F (36.6 °C) (Oral)   Resp 18   Ht 5' 8\" (1.727 m)   Wt 228 lb (103.4 kg)   SpO2 96%   BMI 34.67 kg/m²  Body mass index is 34.67 kg/m². Airway:Normal  Cardiac:Normal  Pulmonary:Normal  Abdomen:Normal  Specific to procedure: none      Pre-Procedure Assessment/Plan:  ASA 3 - Patient with moderate systemic disease with functional limitations  Mallampati II  Level of Sedation Plan:Deep sedation    Post Procedure plan: Return to same level of care    I assessed the patient and find that the patient is in satisfactory condition to proceed with the planned procedure and sedation plan. I have explained the risk, benefits, and alternatives to the procedure. The patient understands and agrees to proceed.   Yes    Crys Leiva MD       (O) [phone]        Crys Leiva MD  7:28 AM 1/20/2023

## 2023-01-20 NOTE — DISCHARGE SUMMARY
Hospitalist Discharge Summary    Patient ID:  Bhargavi Basurto  [id number]  12 y.o.  1940    Admit date: 1/20/2023    Discharge date: 1/20/2023    Disposition: home    Admission Diagnoses:   Patient Active Problem List   Diagnosis    Primary localized osteoarthrosis, lower leg    Rotator cuff strain    Shoulder pain    Complete rotator cuff tear    Colon ulcer    Rotator cuff arthropathy    Primary osteoarthritis of left knee    GI bleed    Obesity    Hyperkalemia    Acute renal failure superimposed on stage 3 chronic kidney disease (HCC)    Anemia    Essential hypertension    CAD (coronary artery disease)    Hyperlipidemia    Acquired hypothyroidism    COPD (chronic obstructive pulmonary disease) ([address]    Asthma with COPD with exacerbation ([facility] [address]    Stage 3a chronic kidney disease ([facility] [address]    Aortocoronary bypass status    Influenza A    Upper GI bleed    Acute blood loss anemia    Hyperglycemia    Nausea       Discharge Diagnoses: Principal Problem:    Upper GI bleed  Active Problems:    COPD (chronic obstructive pulmonary disease) (HCC)    Acute blood loss anemia    Hyperglycemia    Nausea    Acute renal failure superimposed on stage 3 chronic kidney disease (HCC)    Essential hypertension    CAD (coronary artery disease)    Hyperlipidemia    Acquired hypothyroidism  Resolved Problems:    * No resolved hospital problems. *      Code Status:  Full Code    Condition:  Stable    Discharge Diet: Diet:  ADULT DIET; Clear Liquid    PCP to do list:  follow up in 1 week for PCP and CBC, follow up with GI in 2 weeks, may need repeat EGD    Hospital Course: 81y.o. year-old male with a history of hypertension, hyperlipidemia, CAD, COPD, CKD3, acquired hypothyroid and a prior upper GI bleed due to a duodenal ulcer in 2021. He was recently hospitalized for a COPD exacerbation and received high-dose steroids at that time.   He presents to the emergency room for evaluation following an acute onset of black and maroon-colored stools along with nausea which began this morning. He denies having any emesis. He denies seeing ellen blood per rectum. In the emergency room he was found to have stool with positive occult blood. His hemoglobin has dropped 2 g over the past 11 days. Associated signs and symptoms do not include fever or chills, vomiting, diarrhea, hematemesis, sweats, dark urine or jaundice. He was given Protonix and is being admitted for further evaluation and treatment. Pt had EGD today which showed white based 1.5cm anastomotic gastric ulcer with flat dark stain but no active bleeding. Pt hgb has remained stable around 9.4 from a baseline of 13.6 on January 3. Started on protonix bid and carafate 4 times daily. Pt did have mild JERED POA which will improve with IVF. Baseline creatinine is 1.3. Likely the combination of steroids in setting of influenza A and lovenox precipitated the ulcer. Discharge Medications:   Current Discharge Medication List        Current Discharge Medication List        Current Discharge Medication List        CONTINUE these medications which have NOT CHANGED    Details   albuterol sulfate HFA (PROVENTIL;VENTOLIN;PROAIR) 108 (90 Base) MCG/ACT inhaler Inhale 2 puffs into the lungs every 4 hours as needed for Shortness of Breath or Wheezing  Qty: 18 g, Refills: 3      !! vitamin D 25 MCG (1000 UT) CAPS Take 1,000 Units by mouth daily      cyclobenzaprine (FLEXERIL) 10 MG tablet Take 10 mg by mouth 3 times daily as needed      pantoprazole (PROTONIX) 40 MG tablet Take 1 tablet by mouth 2 times daily (before meals)  Qty: 60 tablet, Refills: 0      aspirin EC 81 MG EC tablet Take 1 tablet by mouth daily  Qty: 90 tablet, Refills: 1      !!  Cholecalciferol (VITAMIN D-3 PO) Take by mouth      Coenzyme Q10 (COQ10 PO) Take by mouth      Biotin 5000 MCG TABS Take by mouth      lovastatin (MEVACOR) 20 MG tablet Take 20 mg by mouth nightly      IRON CR PO Take by mouth daily Multiple Vitamins-Minerals (THERAPEUTIC MULTIVITAMIN-MINERALS) tablet Take 1 tablet by mouth daily      Cyanocobalamin (VITAMIN B-12 CR PO) Take by mouth daily      Ascorbic Acid (VITAMIN C) 500 MG tablet Take 500 mg by mouth daily      nitroGLYCERIN (NITROSTAT) 0.4 MG SL tablet Place 0.4 mg under the tongue every 5 minutes as needed. metoprolol (LOPRESSOR) 50 MG tablet Take 50 mg by mouth 2 times daily. levothyroxine (SYNTHROID) 100 MCG tablet Take 50 mcg by mouth daily. montelukast (SINGULAIR) 10 MG tablet Take 10 mg by mouth nightly. !! - Potential duplicate medications found. Please discuss with provider. Current Discharge Medication List              Procedures: EGD:  Findings[de-identified]   Esophagus: Mild GEJ stricture. The findings do not support a diagnosis of Puri's Esophagus. Stomach: S/P B-II. White based 1.5 cm anastomotic gastric ulcer w a flat dark stain, but no active bleeding, biopsied for H PYLORI  Duodenum/Jejunum: normal     Recommendations: -Acid suppression with bid proton pump inhibitor and will need Carafate, -Follow clinical symptoms and laboratory studies for evidence of rebleeding. Assessment on Discharge: Stable, improved     Discharge ROS:  A complete review of systems was asked and negative except for denies pain, weakness, dizziness or shortness of breath    Discharge Exam:  /77   Pulse 77   Temp 98 °F (36.7 °C) (Oral)   Resp 18   Ht 5' 8\" (1.727 m)   Wt 228 lb (103.4 kg)   SpO2 96%   BMI 34.67 kg/m²     Gen: NAD  HEENT: NC/AT, moist mucous membranes, no oropharyngeal erythema or exudate  Neck: supple, trachea midline, no anterior cervical or SC LAD  Heart:  Normal s1/s2, RRR, no murmurs, gallops, or rubs.  no leg edema  Lungs:  diminished bilaterally, no wheeze,no rales or rhonchi, no use of accessory muscles  Abd: bowel sounds present, soft, nontender, nondistended, no masses  Extrem:  No clubbing, cyanosis,  no edema  Skin: no lesion or masses  Psych:  A & O x3  Neuro: grossly intact, moves all four extremities    Pertinent Studies During Hospital Stay:  Radiology:  XR CHEST (2 VW)    Result Date: 1/7/2023  EXAMINATION: TWO XRAY VIEWS OF THE CHEST 1/7/2023 6:02 pm COMPARISON: 07/09/2021 HISTORY: ORDERING SYSTEM PROVIDED HISTORY: Hypoxia, hx of COPD TECHNOLOGIST PROVIDED HISTORY: Reason for exam:->Hypoxia, hx of COPD Reason for Exam: hypoxia, hx of COPD FINDINGS: Status post median sternotomy. The lungs are without acute focal process. Bibasilar hypoaeration. There is no effusion or pneumothorax. The cardiomediastinal silhouette is stable. The osseous structures are stable. No acute process. Bibasilar hypoaeration     CT ABDOMEN PELVIS W IV CONTRAST Additional Contrast? None    Result Date: 1/7/2023  EXAMINATION: CTA OF THE CHEST; CT OF THE ABDOMEN AND PELVIS WITH CONTRAST 1/7/2023 7:24 pm TECHNIQUE: CTA of the chest was performed after the administration of intravenous contrast.  Multiplanar reformatted images are provided for review. MIP images are provided for review. Automated exposure control, iterative reconstruction, and/or weight based adjustment of the mA/kV was utilized to reduce the radiation dose to as low as reasonably achievable.; CT of the abdomen and pelvis was performed with the administration of intravenous contrast. Multiplanar reformatted images are provided for review. Automated exposure control, iterative reconstruction, and/or weight based adjustment of the mA/kV was utilized to reduce the radiation dose to as low as reasonably achievable.  COMPARISON: 01/03/2003 HISTORY: ORDERING SYSTEM PROVIDED HISTORY: Hypoxia, shortness of breath, weakness TECHNOLOGIST PROVIDED HISTORY: Reason for exam:->Hypoxia, shortness of breath, weakness Decision Support Exception - unselect if not a suspected or confirmed emergency medical condition->Emergency Medical Condition (MA) Reason for Exam: SOB weakness Relevant Medical/Surgical History: Smoker for 45 years, dx COPD FINDINGS: CTA chest: Pulmonary Arteries: Pulmonary arteries are adequately opacified for evaluation. No evidence of intraluminal filling defect to suggest pulmonary embolism. Main pulmonary artery is normal in caliber. Motion artifact partially obscuring the distal pulmonary arteries along the lung bases posteriorly. Mediastinum: There are postop changes along the mediastinum and sternum. No evidence of mediastinal lymphadenopathy. The heart and pericardium demonstrate no acute abnormality. There are extensive calcifications along the coronary arteries. There are calcified lymph nodes along the subcarinal region. Lungs/pleura: There is motion artifact along the lung bases limiting the exam.  The lungs are hyperinflated and emphysematous. There is hazy ground-glass and interstitial opacities along the lung bases posteriorly with mild air bronchograms throughout and associated mild bronchiectasis. There is no pulmonary nodule or mass. There are small bibasilar pleural effusions layering posteriorly which is more prominent on the left. CT Abdomen and Pelvis: The liver and spleen are normal size. The gallbladder has been removed with clips in the gallbladder fossa. The common duct is mildly dilated proximally and tapers distally to the ampulla which is unchanged with no filling defect. The pancreas and adrenals are normal.  The kidneys are normal size and function normally with no hydronephrosis or renal stones. The ureters are normal caliber. There are surgical clips along the EG junction and sutures along the proximal stomach extending into the small bowel which is unchanged. There is no bowel obstruction. There surgical clips along the anterior abdomen extending inferiorly. The appendix is not well visualized. The colon is normal caliber. The small bowel is unremarkable. There are diverticula throughout the left colon with no pericolonic inflammation.  The bladder is unremarkable. The prostate gland is top-normal.  No adenopathy or ascites is seen. The mesentery is unremarkable. There is calcified plaque throughout the aorta with no aneurysm and no retroperitoneal mass or adenopathy. The bones are intact. No aggressive osseous lesion is seen. Motion artifact limiting the exam along the lung bases. Within the limitations of the exam, no obvious acute pulmonary embolus can be seen. Moderate bibasilar atelectasis and/or infiltrates versus scarring and associated tiny bibasilar pleural effusions which is more prominent on the left. Recommend follow-up with serial chest x-rays. Mildly dilated atherosclerotic thoracic aorta with no aneurysm or dissection. Extensive coronary artery calcifications. Old healed granulomatous disease in the mediastinum Previous cholecystectomy and postop changes along the upper abdomen no acute abnormality seen. Previous gastric bypass surgery which is unchanged with no bowel obstruction Moderate diverticulosis throughout the left colon with no pericolonic inflammation. CT ABDOMEN PELVIS W IV CONTRAST Additional Contrast? None    Result Date: 1/3/2023  EXAMINATION: CT OF THE ABDOMEN AND PELVIS WITH CONTRAST 1/3/2023 1:45 pm TECHNIQUE: CT of the abdomen and pelvis was performed with the administration of intravenous contrast. Multiplanar reformatted images are provided for review. Automated exposure control, iterative reconstruction, and/or weight based adjustment of the mA/kV was utilized to reduce the radiation dose to as low as reasonably achievable.  COMPARISON: 05/02/2011 HISTORY: ORDERING SYSTEM PROVIDED HISTORY: epigastric abd pain TECHNOLOGIST PROVIDED HISTORY: Additional Contrast?->None Reason for exam:->epigastric abd pain Decision Support Exception - unselect if not a suspected or confirmed emergency medical condition->Emergency Medical Condition (MA) Reason for Exam: epigastric pain x 2 days-no appetite Relevant Medical/Surgical History: bariatric surg-umbilical hernia repair FINDINGS: Lower Chest: Mild bibasilar bronchiectatic changes are noted along with mild chronic pleuroparenchymal scarring. The pleural spaces are clear. Organs: The liver, pancreas, spleen, kidneys and adrenals are unremarkable. The gallbladder is surgically absent. GI/Bowel: There is no bowel dilatation, wall thickening or obstruction. Diverticular changes are scattered throughout the left hemicolon. Postop changes of gastric bypass are noted. Pelvis: The bladder and prostate are unremarkable. Peritoneum/Retroperitoneum: There is no free air, free fluid or intraperitoneal inflammatory change. There is no adenopathy. Postop changes of ventral abdominal hernia repair are present. Bones/Soft Tissues: There is no acute fracture or aggressive osseous lesion. Diverticulosis. CT CHEST PULMONARY EMBOLISM W CONTRAST    Result Date: 1/7/2023  EXAMINATION: CTA OF THE CHEST; CT OF THE ABDOMEN AND PELVIS WITH CONTRAST 1/7/2023 7:24 pm TECHNIQUE: CTA of the chest was performed after the administration of intravenous contrast.  Multiplanar reformatted images are provided for review. MIP images are provided for review. Automated exposure control, iterative reconstruction, and/or weight based adjustment of the mA/kV was utilized to reduce the radiation dose to as low as reasonably achievable.; CT of the abdomen and pelvis was performed with the administration of intravenous contrast. Multiplanar reformatted images are provided for review. Automated exposure control, iterative reconstruction, and/or weight based adjustment of the mA/kV was utilized to reduce the radiation dose to as low as reasonably achievable.  COMPARISON: 01/03/2003 HISTORY: ORDERING SYSTEM PROVIDED HISTORY: Hypoxia, shortness of breath, weakness TECHNOLOGIST PROVIDED HISTORY: Reason for exam:->Hypoxia, shortness of breath, weakness Decision Support Exception - unselect if not a suspected or confirmed emergency medical condition->Emergency Medical Condition (MA) Reason for Exam: SOB weakness Relevant Medical/Surgical History: Smoker for 45 years, dx COPD FINDINGS: CTA chest: Pulmonary Arteries: Pulmonary arteries are adequately opacified for evaluation. No evidence of intraluminal filling defect to suggest pulmonary embolism. Main pulmonary artery is normal in caliber. Motion artifact partially obscuring the distal pulmonary arteries along the lung bases posteriorly. Mediastinum: There are postop changes along the mediastinum and sternum. No evidence of mediastinal lymphadenopathy. The heart and pericardium demonstrate no acute abnormality. There are extensive calcifications along the coronary arteries. There are calcified lymph nodes along the subcarinal region. Lungs/pleura: There is motion artifact along the lung bases limiting the exam.  The lungs are hyperinflated and emphysematous. There is hazy ground-glass and interstitial opacities along the lung bases posteriorly with mild air bronchograms throughout and associated mild bronchiectasis. There is no pulmonary nodule or mass. There are small bibasilar pleural effusions layering posteriorly which is more prominent on the left. CT Abdomen and Pelvis: The liver and spleen are normal size. The gallbladder has been removed with clips in the gallbladder fossa. The common duct is mildly dilated proximally and tapers distally to the ampulla which is unchanged with no filling defect. The pancreas and adrenals are normal.  The kidneys are normal size and function normally with no hydronephrosis or renal stones. The ureters are normal caliber. There are surgical clips along the EG junction and sutures along the proximal stomach extending into the small bowel which is unchanged. There is no bowel obstruction. There surgical clips along the anterior abdomen extending inferiorly. The appendix is not well visualized.   The colon is normal caliber. The small bowel is unremarkable. There are diverticula throughout the left colon with no pericolonic inflammation. The bladder is unremarkable. The prostate gland is top-normal.  No adenopathy or ascites is seen. The mesentery is unremarkable. There is calcified plaque throughout the aorta with no aneurysm and no retroperitoneal mass or adenopathy. The bones are intact. No aggressive osseous lesion is seen. Motion artifact limiting the exam along the lung bases. Within the limitations of the exam, no obvious acute pulmonary embolus can be seen. Moderate bibasilar atelectasis and/or infiltrates versus scarring and associated tiny bibasilar pleural effusions which is more prominent on the left. Recommend follow-up with serial chest x-rays. Mildly dilated atherosclerotic thoracic aorta with no aneurysm or dissection. Extensive coronary artery calcifications. Old healed granulomatous disease in the mediastinum Previous cholecystectomy and postop changes along the upper abdomen no acute abnormality seen. Previous gastric bypass surgery which is unchanged with no bowel obstruction Moderate diverticulosis throughout the left colon with no pericolonic inflammation.             Last Labs on Discharge:     Recent Results (from the past 24 hour(s))   CBC with Auto Differential    Collection Time: 01/20/23  4:50 AM   Result Value Ref Range    WBC 11.9 (H) 4.0 - 11.0 K/uL    RBC 3.38 (L) 4.20 - 5.90 M/uL    Hemoglobin 10.6 (L) 13.5 - 17.5 g/dL    Hematocrit 32.2 (L) 40.5 - 52.5 %    MCV 95.1 80.0 - 100.0 fL    MCH 31.2 26.0 - 34.0 pg    MCHC 32.8 31.0 - 36.0 g/dL    RDW 14.8 12.4 - 15.4 %    Platelets 790 541 - 093 K/uL    MPV 8.3 5.0 - 10.5 fL    Neutrophils % 85.3 %    Lymphocytes % 8.1 %    Monocytes % 5.4 %    Eosinophils % 0.8 %    Basophils % 0.4 %    Neutrophils Absolute 10.1 (H) 1.7 - 7.7 K/uL    Lymphocytes Absolute 1.0 1.0 - 5.1 K/uL    Monocytes Absolute 0.6 0.0 - 1.3 K/uL    Eosinophils Absolute 0.1 0.0 - 0.6 K/uL    Basophils Absolute 0.1 0.0 - 0.2 K/uL   CMP w/ Reflex to MG    Collection Time: 01/20/23  4:50 AM   Result Value Ref Range    Sodium 138 136 - 145 mmol/L    Potassium reflex Magnesium 5.2 (H) 3.5 - 5.1 mmol/L    Chloride 105 99 - 110 mmol/L    CO2 23 21 - 32 mmol/L    Anion Gap 10 3 - 16    Glucose 237 (H) 70 - 99 mg/dL    BUN 40 (H) 7 - 20 mg/dL    Creatinine 1.7 (H) 0.8 - 1.3 mg/dL    Est, Glom Filt Rate 40 (A) >60    Calcium 8.1 (L) 8.3 - 10.6 mg/dL    Total Protein 5.9 (L) 6.4 - 8.2 g/dL    Albumin 3.2 (L) 3.4 - 5.0 g/dL    Albumin/Globulin Ratio 1.2 1.1 - 2.2    Total Bilirubin 0.4 0.0 - 1.0 mg/dL    Alkaline Phosphatase 95 40 - 129 U/L    ALT 57 (H) 10 - 40 U/L    AST 36 15 - 37 U/L   Protime-INR    Collection Time: 01/20/23  4:50 AM   Result Value Ref Range    Protime 15.1 (H) 11.7 - 14.5 sec    INR 1.20 (H) 0.87 - 1.14   APTT    Collection Time: 01/20/23  4:50 AM   Result Value Ref Range    aPTT 23.7 23.0 - 34.3 sec   Blood Occult Stool Screen #1    Collection Time: 01/20/23  4:50 AM   Result Value Ref Range    Occult Blood Screening Result: POSITIVE  Normal range: Negative   (A)    TYPE AND SCREEN    Collection Time: 01/20/23  4:50 AM   Result Value Ref Range    ABO/Rh A POS     Antibody Screen NEG    POCT Glucose    Collection Time: 01/20/23  7:35 AM   Result Value Ref Range    POC Glucose 171 (H) 70 - 99 mg/dl    Performed on ACCU-CHEK    POCT Glucose    Collection Time: 01/20/23  9:11 AM   Result Value Ref Range    POC Glucose 182 (H) 70 - 99 mg/dl    Performed on ACCU-CHEK    Hemoglobin and Hematocrit    Collection Time: 01/20/23  9:19 AM   Result Value Ref Range    Hemoglobin 9.3 (L) 13.5 - 17.5 g/dL    Hematocrit 30.6 (L) 40.5 - 52.5 %   POCT Glucose    Collection Time: 01/20/23 12:34 PM   Result Value Ref Range    POC Glucose 148 (H) 70 - 99 mg/dl    Performed on ACCU-CHEK    Hemoglobin and Hematocrit    Collection Time: 01/20/23  1:20 PM   Result Value Ref Range Hemoglobin 9.4 (L) 13.5 - 17.5 g/dL    Hematocrit 28.4 (L) 40.5 - 52.5 %         Follow up: with Nisa Durham MD    Note that 35 minutes was spent in preparing discharge papers, discussing discharge with patient, medication review, etc.    Thank you Nisa Durham MD for the opportunity to be involved in this patient's care. If you have any questions or concerns please feel free to contact me at [phone].       Electronically signed by Sarah Hui MD on 1/20/2023 at 2:57 PM

## 2023-01-20 NOTE — ED NOTES
Patient identified as a positive fall risk on the ED triage fall screening. Patient placed in fall precautions which includes:  yellow fall risk bracelet on wrist and yellow socks on feet. Patient instructed on importance of not getting out of bed or ambulating without assistance for safety. Pt verbalized understanding.        Dayo Mcwilliams RN  01/20/23 [id number]

## 2023-01-20 NOTE — PROGRESS NOTES
4 Eyes Skin Assessment     The patient is being assess for   Admission    I agree that 2 RN's have performed a thorough Head to Toe Skin Assessment on the patient. ALL assessment sites listed below have been assessed. Areas assessed by both nurses:   [x]   Head, Face, and Ears   [x]   Shoulders, Back, and Chest, Abdomen  [x]   Arms, Elbows, and Hands   [x]   Coccyx, Sacrum, and Ischium  [x]   Legs, Feet, and Heels        ****    **SHARE this note so that the co-signing nurse is able to place an eSignature**    Co-signer eSignature: {Esignature:[id number]}    Does the Patient have Skin Breakdown?   No          Cuco Prevention initiated:  Yes   Wound Care Orders initiated:  No      WOC nurse consulted for Pressure Injury (Stage 3,4, Unstageable, DTI, NWPT, Complex wounds)and New or Established Ostomies:  No      Primary Nurse eSignature: Electronically signed by Elvira Mcclelland RN on 1/20/23 at 6:55 AM EST

## 2023-02-06 PROBLEM — J10.1 INFLUENZA A: Status: RESOLVED | Noted: 2023-01-07 | Resolved: 2023-02-06

## 2023-12-04 ENCOUNTER — APPOINTMENT (OUTPATIENT)
Dept: GENERAL RADIOLOGY | Age: 83
End: 2023-12-04
Payer: OTHER GOVERNMENT

## 2023-12-04 ENCOUNTER — HOSPITAL ENCOUNTER (INPATIENT)
Age: 83
LOS: 3 days | Discharge: HOME | End: 2023-12-07
Attending: EMERGENCY MEDICINE | Admitting: STUDENT IN AN ORGANIZED HEALTH CARE EDUCATION/TRAINING PROGRAM
Payer: OTHER GOVERNMENT

## 2023-12-04 DIAGNOSIS — J81.0 ACUTE PULMONARY EDEMA (HCC): ICD-10-CM

## 2023-12-04 DIAGNOSIS — I50.9 ACUTE ON CHRONIC CONGESTIVE HEART FAILURE, UNSPECIFIED HEART FAILURE TYPE (HCC): Primary | ICD-10-CM

## 2023-12-04 DIAGNOSIS — R79.89 ELEVATED TROPONIN: ICD-10-CM

## 2023-12-04 DIAGNOSIS — R60.0 BILATERAL LEG EDEMA: ICD-10-CM

## 2023-12-04 DIAGNOSIS — R06.02 SHORTNESS OF BREATH: ICD-10-CM

## 2023-12-04 DIAGNOSIS — D64.9 ANEMIA, UNSPECIFIED TYPE: ICD-10-CM

## 2023-12-04 DIAGNOSIS — N18.9 CHRONIC KIDNEY DISEASE, UNSPECIFIED CKD STAGE: ICD-10-CM

## 2023-12-04 DIAGNOSIS — J90 BILATERAL PLEURAL EFFUSION: ICD-10-CM

## 2023-12-04 LAB
ALBUMIN SERPL-MCNC: 3.4 G/DL (ref 3.4–5)
ALBUMIN/GLOB SERPL: 1.1 {RATIO} (ref 1.1–2.2)
ALP SERPL-CCNC: 87 U/L (ref 40–129)
ALT SERPL-CCNC: 13 U/L (ref 10–40)
ANION GAP SERPL CALCULATED.3IONS-SCNC: 9 MMOL/L (ref 3–16)
AST SERPL-CCNC: 23 U/L (ref 15–37)
BASE EXCESS BLDV CALC-SCNC: -2.5 MMOL/L (ref -3–3)
BASOPHILS # BLD: 0 K/UL (ref 0–0.2)
BASOPHILS NFR BLD: 0.4 %
BILIRUB SERPL-MCNC: 0.3 MG/DL (ref 0–1)
BUN SERPL-MCNC: 21 MG/DL (ref 7–20)
CALCIUM SERPL-MCNC: 8.5 MG/DL (ref 8.3–10.6)
CHLORIDE SERPL-SCNC: 106 MMOL/L (ref 99–110)
CO2 BLDV-SCNC: 24 MMOL/L
CO2 SERPL-SCNC: 24 MMOL/L (ref 21–32)
COHGB MFR BLDV: 3.3 % (ref 0–1.5)
CREAT SERPL-MCNC: 1.5 MG/DL (ref 0.8–1.3)
DEPRECATED RDW RBC AUTO: 21 % (ref 12.4–15.4)
EKG ATRIAL RATE: 75 BPM
EKG DIAGNOSIS: NORMAL
EKG P AXIS: 61 DEGREES
EKG P-R INTERVAL: 218 MS
EKG Q-T INTERVAL: 470 MS
EKG QRS DURATION: 136 MS
EKG QTC CALCULATION (BAZETT): 524 MS
EKG R AXIS: -79 DEGREES
EKG T AXIS: 36 DEGREES
EKG VENTRICULAR RATE: 75 BPM
EOSINOPHIL # BLD: 0.2 K/UL (ref 0–0.6)
EOSINOPHIL NFR BLD: 3.6 %
FLUAV RNA RESP QL NAA+PROBE: NOT DETECTED
FLUBV RNA RESP QL NAA+PROBE: NOT DETECTED
GFR SERPLBLD CREATININE-BSD FMLA CKD-EPI: 46 ML/MIN/{1.73_M2}
GLUCOSE SERPL-MCNC: 109 MG/DL (ref 70–99)
HCO3 BLDV-SCNC: 23 MMOL/L (ref 23–29)
HCT VFR BLD AUTO: 26.6 % (ref 40.5–52.5)
HGB BLD-MCNC: 7.9 G/DL (ref 13.5–17.5)
LACTATE BLDV-SCNC: 1 MMOL/L (ref 0.4–2)
LYMPHOCYTES # BLD: 0.6 K/UL (ref 1–5.1)
LYMPHOCYTES NFR BLD: 13.6 %
MAGNESIUM SERPL-MCNC: 2.2 MG/DL (ref 1.8–2.4)
MCH RBC QN AUTO: 22 PG (ref 26–34)
MCHC RBC AUTO-ENTMCNC: 29.6 G/DL (ref 31–36)
MCV RBC AUTO: 74.1 FL (ref 80–100)
METHGB MFR BLDV: 0.1 %
MONOCYTES # BLD: 0.4 K/UL (ref 0–1.3)
MONOCYTES NFR BLD: 8.2 %
NEUTROPHILS # BLD: 3.2 K/UL (ref 1.7–7.7)
NEUTROPHILS NFR BLD: 74.2 %
NT-PROBNP SERPL-MCNC: ABNORMAL PG/ML (ref 0–449)
O2 THERAPY: ABNORMAL
PCO2 BLDV: 42.6 MMHG (ref 40–50)
PH BLDV: 7.35 [PH] (ref 7.35–7.45)
PLATELET # BLD AUTO: 154 K/UL (ref 135–450)
PMV BLD AUTO: 8.3 FL (ref 5–10.5)
PO2 BLDV: 41 MMHG (ref 25–40)
POTASSIUM SERPL-SCNC: 4.9 MMOL/L (ref 3.5–5.1)
PROT SERPL-MCNC: 6.6 G/DL (ref 6.4–8.2)
RBC # BLD AUTO: 3.59 M/UL (ref 4.2–5.9)
SAO2 % BLDV: 73 %
SARS-COV-2 RNA RESP QL NAA+PROBE: NOT DETECTED
SODIUM SERPL-SCNC: 139 MMOL/L (ref 136–145)
TROPONIN, HIGH SENSITIVITY: 25 NG/L (ref 0–22)
TROPONIN, HIGH SENSITIVITY: 27 NG/L (ref 0–22)
TROPONIN, HIGH SENSITIVITY: 32 NG/L (ref 0–22)
TROPONIN, HIGH SENSITIVITY: 33 NG/L (ref 0–22)
WBC # BLD AUTO: 4.3 K/UL (ref 4–11)

## 2023-12-04 PROCEDURE — 93010 ELECTROCARDIOGRAM REPORT: CPT | Performed by: INTERNAL MEDICINE

## 2023-12-04 PROCEDURE — 6360000002 HC RX W HCPCS: Performed by: STUDENT IN AN ORGANIZED HEALTH CARE EDUCATION/TRAINING PROGRAM

## 2023-12-04 PROCEDURE — 83550 IRON BINDING TEST: CPT

## 2023-12-04 PROCEDURE — 99285 EMERGENCY DEPT VISIT HI MDM: CPT

## 2023-12-04 PROCEDURE — 6370000000 HC RX 637 (ALT 250 FOR IP): Performed by: STUDENT IN AN ORGANIZED HEALTH CARE EDUCATION/TRAINING PROGRAM

## 2023-12-04 PROCEDURE — 83540 ASSAY OF IRON: CPT

## 2023-12-04 PROCEDURE — 6370000000 HC RX 637 (ALT 250 FOR IP): Performed by: NURSE PRACTITIONER

## 2023-12-04 PROCEDURE — 83735 ASSAY OF MAGNESIUM: CPT

## 2023-12-04 PROCEDURE — 71045 X-RAY EXAM CHEST 1 VIEW: CPT

## 2023-12-04 PROCEDURE — 6360000002 HC RX W HCPCS: Performed by: EMERGENCY MEDICINE

## 2023-12-04 PROCEDURE — 80053 COMPREHEN METABOLIC PANEL: CPT

## 2023-12-04 PROCEDURE — 1200000000 HC SEMI PRIVATE

## 2023-12-04 PROCEDURE — 2580000003 HC RX 258: Performed by: STUDENT IN AN ORGANIZED HEALTH CARE EDUCATION/TRAINING PROGRAM

## 2023-12-04 PROCEDURE — 83880 ASSAY OF NATRIURETIC PEPTIDE: CPT

## 2023-12-04 PROCEDURE — 87636 SARSCOV2 & INF A&B AMP PRB: CPT

## 2023-12-04 PROCEDURE — 6370000000 HC RX 637 (ALT 250 FOR IP): Performed by: EMERGENCY MEDICINE

## 2023-12-04 PROCEDURE — 83036 HEMOGLOBIN GLYCOSYLATED A1C: CPT

## 2023-12-04 PROCEDURE — 94640 AIRWAY INHALATION TREATMENT: CPT

## 2023-12-04 PROCEDURE — 85025 COMPLETE CBC W/AUTO DIFF WBC: CPT

## 2023-12-04 PROCEDURE — 96374 THER/PROPH/DIAG INJ IV PUSH: CPT

## 2023-12-04 PROCEDURE — 82803 BLOOD GASES ANY COMBINATION: CPT

## 2023-12-04 PROCEDURE — 83605 ASSAY OF LACTIC ACID: CPT

## 2023-12-04 PROCEDURE — 80061 LIPID PANEL: CPT

## 2023-12-04 PROCEDURE — 84484 ASSAY OF TROPONIN QUANT: CPT

## 2023-12-04 PROCEDURE — 93005 ELECTROCARDIOGRAM TRACING: CPT | Performed by: EMERGENCY MEDICINE

## 2023-12-04 PROCEDURE — 36415 COLL VENOUS BLD VENIPUNCTURE: CPT

## 2023-12-04 RX ORDER — SODIUM CHLORIDE 0.9 % (FLUSH) 0.9 %
5-40 SYRINGE (ML) INJECTION EVERY 12 HOURS SCHEDULED
Status: DISCONTINUED | OUTPATIENT
Start: 2023-12-04 | End: 2023-12-07 | Stop reason: HOSPADM

## 2023-12-04 RX ORDER — IPRATROPIUM BROMIDE AND ALBUTEROL SULFATE 2.5; .5 MG/3ML; MG/3ML
1 SOLUTION RESPIRATORY (INHALATION) ONCE
Status: COMPLETED | OUTPATIENT
Start: 2023-12-04 | End: 2023-12-04

## 2023-12-04 RX ORDER — ACETAMINOPHEN 650 MG/1
650 SUPPOSITORY RECTAL EVERY 6 HOURS PRN
Status: DISCONTINUED | OUTPATIENT
Start: 2023-12-04 | End: 2023-12-07 | Stop reason: HOSPADM

## 2023-12-04 RX ORDER — ONDANSETRON 2 MG/ML
4 INJECTION INTRAMUSCULAR; INTRAVENOUS EVERY 6 HOURS PRN
Status: DISCONTINUED | OUTPATIENT
Start: 2023-12-04 | End: 2023-12-07 | Stop reason: HOSPADM

## 2023-12-04 RX ORDER — TRAMADOL HYDROCHLORIDE 50 MG/1
50 TABLET ORAL EVERY 6 HOURS PRN
Status: DISCONTINUED | OUTPATIENT
Start: 2023-12-04 | End: 2023-12-07 | Stop reason: HOSPADM

## 2023-12-04 RX ORDER — ENOXAPARIN SODIUM 100 MG/ML
30 INJECTION SUBCUTANEOUS 2 TIMES DAILY
Status: DISCONTINUED | OUTPATIENT
Start: 2023-12-04 | End: 2023-12-07 | Stop reason: HOSPADM

## 2023-12-04 RX ORDER — POTASSIUM CHLORIDE 20 MEQ/1
40 TABLET, EXTENDED RELEASE ORAL PRN
Status: DISCONTINUED | OUTPATIENT
Start: 2023-12-04 | End: 2023-12-07 | Stop reason: HOSPADM

## 2023-12-04 RX ORDER — ONDANSETRON 4 MG/1
4 TABLET, ORALLY DISINTEGRATING ORAL EVERY 8 HOURS PRN
Status: DISCONTINUED | OUTPATIENT
Start: 2023-12-04 | End: 2023-12-07 | Stop reason: HOSPADM

## 2023-12-04 RX ORDER — FUROSEMIDE 10 MG/ML
40 INJECTION INTRAMUSCULAR; INTRAVENOUS ONCE
Status: COMPLETED | OUTPATIENT
Start: 2023-12-04 | End: 2023-12-04

## 2023-12-04 RX ORDER — SODIUM CHLORIDE 0.9 % (FLUSH) 0.9 %
5-40 SYRINGE (ML) INJECTION PRN
Status: DISCONTINUED | OUTPATIENT
Start: 2023-12-04 | End: 2023-12-07 | Stop reason: HOSPADM

## 2023-12-04 RX ORDER — SUCRALFATE 1 G/1
1 TABLET ORAL 4 TIMES DAILY
Status: DISCONTINUED | OUTPATIENT
Start: 2023-12-04 | End: 2023-12-07 | Stop reason: HOSPADM

## 2023-12-04 RX ORDER — SODIUM CHLORIDE 9 MG/ML
INJECTION, SOLUTION INTRAVENOUS PRN
Status: DISCONTINUED | OUTPATIENT
Start: 2023-12-04 | End: 2023-12-07 | Stop reason: HOSPADM

## 2023-12-04 RX ORDER — ALBUTEROL SULFATE 90 UG/1
2 AEROSOL, METERED RESPIRATORY (INHALATION) EVERY 4 HOURS PRN
Status: DISCONTINUED | OUTPATIENT
Start: 2023-12-04 | End: 2023-12-07 | Stop reason: HOSPADM

## 2023-12-04 RX ORDER — POLYETHYLENE GLYCOL 3350 17 G/17G
17 POWDER, FOR SOLUTION ORAL DAILY PRN
Status: DISCONTINUED | OUTPATIENT
Start: 2023-12-04 | End: 2023-12-07 | Stop reason: HOSPADM

## 2023-12-04 RX ORDER — ASPIRIN 81 MG/1
81 TABLET ORAL DAILY
Status: DISCONTINUED | OUTPATIENT
Start: 2023-12-04 | End: 2023-12-07 | Stop reason: HOSPADM

## 2023-12-04 RX ORDER — ACETAMINOPHEN 325 MG/1
650 TABLET ORAL EVERY 6 HOURS PRN
Status: DISCONTINUED | OUTPATIENT
Start: 2023-12-04 | End: 2023-12-07 | Stop reason: HOSPADM

## 2023-12-04 RX ORDER — POTASSIUM CHLORIDE 7.45 MG/ML
10 INJECTION INTRAVENOUS PRN
Status: DISCONTINUED | OUTPATIENT
Start: 2023-12-04 | End: 2023-12-07 | Stop reason: HOSPADM

## 2023-12-04 RX ORDER — MAGNESIUM SULFATE IN WATER 40 MG/ML
2000 INJECTION, SOLUTION INTRAVENOUS PRN
Status: DISCONTINUED | OUTPATIENT
Start: 2023-12-04 | End: 2023-12-07 | Stop reason: HOSPADM

## 2023-12-04 RX ORDER — FUROSEMIDE 10 MG/ML
40 INJECTION INTRAMUSCULAR; INTRAVENOUS 2 TIMES DAILY
Status: DISCONTINUED | OUTPATIENT
Start: 2023-12-04 | End: 2023-12-05

## 2023-12-04 RX ORDER — LEVOTHYROXINE SODIUM 0.05 MG/1
50 TABLET ORAL DAILY
Status: DISCONTINUED | OUTPATIENT
Start: 2023-12-05 | End: 2023-12-07 | Stop reason: HOSPADM

## 2023-12-04 RX ADMIN — ENOXAPARIN SODIUM 30 MG: 100 INJECTION SUBCUTANEOUS at 21:01

## 2023-12-04 RX ADMIN — SUCRALFATE 1 G: 1 TABLET ORAL at 21:00

## 2023-12-04 RX ADMIN — TRAMADOL HYDROCHLORIDE 50 MG: 50 TABLET ORAL at 21:00

## 2023-12-04 RX ADMIN — SUCRALFATE 1 G: 1 TABLET ORAL at 17:14

## 2023-12-04 RX ADMIN — FUROSEMIDE 40 MG: 10 INJECTION, SOLUTION INTRAMUSCULAR; INTRAVENOUS at 17:14

## 2023-12-04 RX ADMIN — FUROSEMIDE 40 MG: 10 INJECTION, SOLUTION INTRAMUSCULAR; INTRAVENOUS at 14:06

## 2023-12-04 RX ADMIN — ACETAMINOPHEN 650 MG: 325 TABLET ORAL at 18:31

## 2023-12-04 RX ADMIN — SODIUM CHLORIDE, PRESERVATIVE FREE 10 ML: 5 INJECTION INTRAVENOUS at 21:03

## 2023-12-04 RX ADMIN — IPRATROPIUM BROMIDE AND ALBUTEROL SULFATE 1 DOSE: 2.5; .5 SOLUTION RESPIRATORY (INHALATION) at 13:14

## 2023-12-04 ASSESSMENT — PAIN SCALES - GENERAL
PAINLEVEL_OUTOF10: 6
PAINLEVEL_OUTOF10: 6

## 2023-12-04 ASSESSMENT — ENCOUNTER SYMPTOMS
ABDOMINAL PAIN: 0
WHEEZING: 1
SHORTNESS OF BREATH: 1
CHEST TIGHTNESS: 1
COUGH: 1
VOMITING: 0

## 2023-12-04 ASSESSMENT — PAIN DESCRIPTION - LOCATION: LOCATION: KNEE;HAND

## 2023-12-04 ASSESSMENT — PAIN - FUNCTIONAL ASSESSMENT: PAIN_FUNCTIONAL_ASSESSMENT: NONE - DENIES PAIN

## 2023-12-04 NOTE — PROGRESS NOTES
4 Eyes Skin Assessment     NAME:  Rehana Lilly  YOB: 1940  MEDICAL RECORD NUMBER:  [id number]    The patient is being assessed for  Admission    I agree that at least one RN has performed a thorough Head to Toe Skin Assessment on the patient. ALL assessment sites listed below have been assessed. Areas assessed by both nurses:    Head, Face, Ears, Shoulders, Back, Chest, Arms, Elbows, Hands, Sacrum. Buttock, Coccyx, Ischium, Legs. Feet and Heels, and Under Medical Devices         Does the Patient have a Wound?  No noted wound(s)       Cuco Prevention initiated by RN: No  Wound Care Orders initiated by RN: No    Pressure Injury (Stage 3,4, Unstageable, DTI, NWPT, and Complex wounds) if present, place Wound referral order by RN under : No    New Ostomies, if present place, Ostomy referral order under : No     Nurse 1 eSignature: Electronically signed by Migue Irvin RN on 12/4/23 at 4:56 PM EST    **SHARE this note so that the co-signing nurse can place an eSignature**    Nurse 2 eSignature: Electronically signed by Eboni Perez RN on 12/4/23 at 7:01 PM EST

## 2023-12-04 NOTE — ACP (ADVANCE CARE PLANNING)
Advance Care Planning     General Advance Care Planning (ACP) Conversation    Date of Conversation: 12/4/2023  Conducted with: Patient with Decision Making Capacity    Healthcare Decision Maker:    Primary Decision Maker: Farida Napier - [phone]    Primary Decision Maker: Linsey Rossi [facility] - [phone]  Click here to complete Healthcare Decision Makers including selection of the Healthcare Decision Maker Relationship (ie \"Primary\"). Today we documented Decision Maker(s). The patient will provide ACP documents. Content/Action Overview:   Has ACP document(s) on file - do NOT reflect the patient's care preferences, patient to provide new document(s)          Length of Voluntary ACP Conversation in minutes:  <16 minutes (Non-Billable)    LYNN Ramsey

## 2023-12-04 NOTE — ED NOTES
Pt ambulated in the hallway with a cane and minimal assistance. Resting HR of 93 and O2 95% on  room air. Pt ambulated and HR range of 84-97 and O2 range of 91-95%.  Pt tolerated well     Lauren Morales RN  12/04/23 [id number]

## 2023-12-04 NOTE — CONSULTS
Consult Placed     Who: CARDIOLOGY, [facility]   Date:12/4/2023  OCBO:[id number]     Electronically signed by Michael Croft on 12/4/2023 at 4:26 PM

## 2023-12-04 NOTE — CARE COORDINATION
Case Management Assessment  Initial Evaluation    Date/Time of Evaluation: 12/4/2023 3:10 PM  Assessment Completed by: LYNN Oro    If patient is discharged prior to next notation, then this note serves as note for discharge by case management. Patient Name: Tadeo Arzate                   YOB: 1940  Diagnosis: Heart failure [facility]) [I50.9]                   Date / Time: 12/4/2023 12:10 PM    Patient Admission Status: Inpatient   Readmission Risk (Low < 19, Mod (19-27), High > 27): Readmission Risk Score: 21.8    Current PCP: Sol Grajeda MD  PCP verified by CM? (P) Yes    Chart Reviewed: Yes      History Provided by: (P) Patient  Patient Orientation: (P) Alert and Oriented    Patient Cognition: (P) Alert    Hospitalization in the last 30 days (Readmission):  No    If yes, Readmission Assessment in CM Navigator will be completed.     Advance Directives:      Code Status: Prior   Patient's Primary Decision Maker is: (P) Named in [address] [place] [address]    Primary Decision Maker: Arcadio Phillips - Child - [phone]    Primary Decision Maker: Nella Cruz - Child - [phone]    Discharge Planning:    Patient lives with: (P) Spouse/Significant Other, Children Type of Home: (P) House  Primary Care Giver: (P) Self  Patient Support Systems include: (P) Spouse/Significant Other, Children   Current Financial resources: (P)  ([facility])  Current community resources: (P) ECF/Home Care  Current services prior to admission: (P) Durable Medical Equipment            Current DME: (P) Cane            Type of Home Care services:  (P) Nursing Services    ADLS  Prior functional level: (P) Independent in ADLs/IADLs  Current functional level: (P) Independent in ADLs/IADLs    PT AM-PAC:   /24  OT AM-PAC:   /24    Family can provide assistance at DC: (P) Yes  Would you like Case Management to discuss the discharge plan with any other family members/significant others, and if so, who? (P) No  Plans to Return to Present Housing: (P) Yes  Other Identified Issues/Barriers to RETURNING to current housing: none noted  Potential Assistance needed at discharge: (P) Home Care            Potential DME:    Patient expects to discharge to: (P) [address] [place] [address] for transportation at discharge: (P) Family    Financial    Payor: MEDICARE / Plan: MEDICARE PART A AND B / Product Type: *No Product type* /     Does insurance require precert for SNF: No    Potential assistance Purchasing Medications: (P) No  Meds-to-Beds request:        [facility] [facility]  [address] [place] [address]  Phone: [phone] Fax: [phone]    [address] [place] [address]  Phone: [phone] Fax: [phone]      Notes:    Factors facilitating achievement of predicted outcomes: Family support, Motivated, Cooperative, Pleasant, and Sense of humor    Barriers to discharge: Medical complications    Additional Case Management Notes: Referred to patient for d/c planning. Spoke to patient. Patient is an 81year old male admitted for CHF/COPD. Patient lives at home with wife and son. Patient stays on first floor. Patient reports wife has dementia. He reports he still drives. Discussed [facility] A[facility] and Attendance and information provided. Patient denies other needs at this time. The Plan for Transition of Care is related to the following treatment goals of Heart failure ([address]) [K61.6]    IF APPLICABLE: The Patient and/or patient representative Morales Woodall and his family were provided with a choice of provider and agrees with the discharge plan.  Freedom of choice list with basic dialogue that supports the patient's individualized plan of care/goals and shares the quality data associated with the providers was provided to:     Patient Representative Name:

## 2023-12-04 NOTE — ACP (ADVANCE CARE PLANNING)
Advance Care Planning     Advance Care Planning Inpatient Note  Spiritual Care Department    Today's Date: 12/4/2023  Unit: [place] B3 - MED SURG    Received request from IDT Member. Upon review of chart and communication with care team, patient's decision making abilities are not in question. . Patient was/were present in the room during visit. Goals of ACP Conversation:  Discuss advance care planning documents    Health Care Decision Makers:       Primary Decision Maker: Nella Cruz - Tyshawn - [phone]  Summary:  Updated Healthcare Decision Maker  Documented Next of Kin, per patient report    Advance Care Planning Documents (Patient Wishes):  Healthcare Power of /Advance Directive Appointment of Health Care Agent  Living Will/Advance Directive     Assessment:  Pt has an advanced directives document on file. Pt said he likes to take off his spouse as she may not be able to make healthcare decision for him. Spouse has alzheimer. Pt has two children. Pt said his daughter Rodger and his granddaughter will be the ones to make decisions for him should he be unable to make them at any time in the future. Pt does not have the addresses of his decision makers. Pt asked writer to come back tomorrow. Son coming tonight to bring them. Spiritual Health will follow. Writer also gave pt a blank copy of advanced directives for his reading.     Interventions:  Provided education on documents for clarity and greater understanding  Reviewed but did not complete ACP document    Outcomes/Plan:  ACP Discussion: Postponed  Teach Back Method used to verify the patient's and/or Healthcare Decision Maker's understanding of key information in the advance directive documents    Electronically signed by Chaplain Linnette on 12/4/2023 at 4:59 PM

## 2023-12-04 NOTE — H&P
V2.0  History and Physical      Name:  Tee Saldivar /Age/Sex: 1940  (81 y.o. male)   MRN & CSN:  [id number] & [id number] Encounter Date/Time: 2023 2:12 PM EST   Location:   PCP: Yuri Leary MD       Hospital Day: 1    Assessment and Plan:   Tee Saldivar is a 81 y.o. male with a pmh of chronic GERD, COPD, CAD hypertension hyperlipidemia CAD history of CABG who presents with <principal problem not specified>      Acute CHF exacerbation: Presented with orthopnea PND elevated BNP with shortness of breath and bilateral lower extremity swelling. Estimated goal dry weight of 228 pounds. Current weight is 240 pounds need to lose around 18 pounds. Started on IV Lasix 40 mg twice daily monitor BMP. Telemetry in place. On guideline directed medical therapy echocardiogram has been ordered cardiology has been consulted  Normocytic normochromic anemia in the setting of anemia of chronic disease: Hemoglobin of 7.9 monitor and keep the hemoglobin above 7. Ideally goal is with CABG patient should be appropriate. Will monitor for now  JERED in the setting of above, consider nephrology consultation if worsening JERED otherwise monitor for now with BMP  Coronary artery disease with history of CABG on aspirin  Chronic GERD on PPI  Chronic obstructive pulmonary disease on inhalers at home  Benign essential hypertension  Hyperlipidemia on statin at home      Comment: Please note this report has been produced using speech recognition software and may contain errors related to that system including errors in grammar, punctuation, and spelling, as well as words and phrases that may be inappropriate. If there are any questions or concerns please feel free to contact the dictating provider for clarification.      Disposition:   Current Living situation: Home  Expected Disposition: Home  Estimated D/C: 3 to 4 days    Diet No diet orders on file   DVT Prophylaxis [] Lovenox, []  Heparin, [] SCDs, [] 01/31/2012 08:33 AM    BLOODU TRACE-INTACT 01/07/2023 09:38 PM    GLUCOSEU Negative 01/07/2023 09:38 PM    GLUCOSEU NEGATIVE 01/31/2012 08:33 AM    KETUA 15 01/07/2023 09:38 PM     Urine Cultures: No results found for: \"LABURIN\"  Blood Cultures: No results found for: \"BC\"  No results found for: \"BLOODCULT2\"  Organism: No results found for: \"ORG\"    Imaging/Diagnostics Last 24 Hours   XR CHEST PORTABLE    Result Date: 12/4/2023  EXAMINATION: ONE XRAY VIEW OF THE CHEST 12/4/2023 1:05 pm COMPARISON: 01/07/2023 HISTORY: ORDERING SYSTEM PROVIDED HISTORY: SOB TECHNOLOGIST PROVIDED HISTORY: Reason for exam:->SOB Reason for Exam: sob FINDINGS: There is pulmonary vascular congestion, bilateral pleural effusions and cardiomegaly, in keeping with CHF. Patient is status post sternotomy. Pulmonary vascular congestion, bilateral pleural effusions and cardiomegaly in keeping with CHF.        Personally reviewed Lab Studies, Imaging    Electronically signed by Andrew Piña MD on 12/4/2023 at 2:12 PM

## 2023-12-05 LAB
ANION GAP SERPL CALCULATED.3IONS-SCNC: 9 MMOL/L (ref 3–16)
BUN SERPL-MCNC: 23 MG/DL (ref 7–20)
CALCIUM SERPL-MCNC: 8.7 MG/DL (ref 8.3–10.6)
CHLORIDE SERPL-SCNC: 101 MMOL/L (ref 99–110)
CHOLEST SERPL-MCNC: 115 MG/DL (ref 0–199)
CHOLEST SERPL-MCNC: 121 MG/DL (ref 0–199)
CO2 SERPL-SCNC: 26 MMOL/L (ref 21–32)
CREAT SERPL-MCNC: 1.6 MG/DL (ref 0.8–1.3)
DEPRECATED RDW RBC AUTO: 20.9 % (ref 12.4–15.4)
EST. AVERAGE GLUCOSE BLD GHB EST-MCNC: 128.4 MG/DL
GFR SERPLBLD CREATININE-BSD FMLA CKD-EPI: 43 ML/MIN/{1.73_M2}
GLUCOSE SERPL-MCNC: 115 MG/DL (ref 70–99)
HBA1C MFR BLD: 6.1 %
HCT VFR BLD AUTO: 26.7 % (ref 40.5–52.5)
HDLC SERPL-MCNC: 62 MG/DL (ref 40–60)
HDLC SERPL-MCNC: 70 MG/DL (ref 40–60)
HGB BLD-MCNC: 8.2 G/DL (ref 13.5–17.5)
IRON SATN MFR SERPL: 8 % (ref 20–50)
IRON SERPL-MCNC: 32 UG/DL (ref 59–158)
LDLC SERPL CALC-MCNC: 38 MG/DL
LDLC SERPL CALC-MCNC: 40 MG/DL
MAGNESIUM SERPL-MCNC: 1.9 MG/DL (ref 1.8–2.4)
MCH RBC QN AUTO: 22.5 PG (ref 26–34)
MCHC RBC AUTO-ENTMCNC: 30.9 G/DL (ref 31–36)
MCV RBC AUTO: 72.9 FL (ref 80–100)
PHOSPHATE SERPL-MCNC: 3.6 MG/DL (ref 2.5–4.9)
PLATELET # BLD AUTO: 149 K/UL (ref 135–450)
PMV BLD AUTO: 8.5 FL (ref 5–10.5)
POTASSIUM SERPL-SCNC: 4.3 MMOL/L (ref 3.5–5.1)
RBC # BLD AUTO: 3.67 M/UL (ref 4.2–5.9)
SODIUM SERPL-SCNC: 136 MMOL/L (ref 136–145)
TIBC SERPL-MCNC: 392 UG/DL (ref 260–445)
TRIGL SERPL-MCNC: 55 MG/DL (ref 0–150)
TRIGL SERPL-MCNC: 76 MG/DL (ref 0–150)
TROPONIN, HIGH SENSITIVITY: 37 NG/L (ref 0–22)
VLDLC SERPL CALC-MCNC: 11 MG/DL
VLDLC SERPL CALC-MCNC: 15 MG/DL
WBC # BLD AUTO: 3.6 K/UL (ref 4–11)

## 2023-12-05 PROCEDURE — 36415 COLL VENOUS BLD VENIPUNCTURE: CPT

## 2023-12-05 PROCEDURE — 83735 ASSAY OF MAGNESIUM: CPT

## 2023-12-05 PROCEDURE — 85027 COMPLETE CBC AUTOMATED: CPT

## 2023-12-05 PROCEDURE — 80048 BASIC METABOLIC PNL TOTAL CA: CPT

## 2023-12-05 PROCEDURE — 6370000000 HC RX 637 (ALT 250 FOR IP): Performed by: NURSE PRACTITIONER

## 2023-12-05 PROCEDURE — 6370000000 HC RX 637 (ALT 250 FOR IP): Performed by: STUDENT IN AN ORGANIZED HEALTH CARE EDUCATION/TRAINING PROGRAM

## 2023-12-05 PROCEDURE — 2580000003 HC RX 258: Performed by: STUDENT IN AN ORGANIZED HEALTH CARE EDUCATION/TRAINING PROGRAM

## 2023-12-05 PROCEDURE — 84484 ASSAY OF TROPONIN QUANT: CPT

## 2023-12-05 PROCEDURE — 80061 LIPID PANEL: CPT

## 2023-12-05 PROCEDURE — 84100 ASSAY OF PHOSPHORUS: CPT

## 2023-12-05 PROCEDURE — 99223 1ST HOSP IP/OBS HIGH 75: CPT | Performed by: STUDENT IN AN ORGANIZED HEALTH CARE EDUCATION/TRAINING PROGRAM

## 2023-12-05 PROCEDURE — 6360000002 HC RX W HCPCS: Performed by: STUDENT IN AN ORGANIZED HEALTH CARE EDUCATION/TRAINING PROGRAM

## 2023-12-05 PROCEDURE — 1200000000 HC SEMI PRIVATE

## 2023-12-05 PROCEDURE — 93306 TTE W/DOPPLER COMPLETE: CPT

## 2023-12-05 RX ORDER — METOPROLOL SUCCINATE 25 MG/1
25 TABLET, EXTENDED RELEASE ORAL DAILY
Status: DISCONTINUED | OUTPATIENT
Start: 2023-12-05 | End: 2023-12-07 | Stop reason: HOSPADM

## 2023-12-05 RX ORDER — HYDRALAZINE HYDROCHLORIDE 10 MG/1
10 TABLET, FILM COATED ORAL EVERY 8 HOURS SCHEDULED
Status: DISCONTINUED | OUTPATIENT
Start: 2023-12-05 | End: 2023-12-07 | Stop reason: HOSPADM

## 2023-12-05 RX ADMIN — HYDRALAZINE HYDROCHLORIDE 10 MG: 10 TABLET, FILM COATED ORAL at 14:28

## 2023-12-05 RX ADMIN — HYDRALAZINE HYDROCHLORIDE 10 MG: 10 TABLET, FILM COATED ORAL at 22:17

## 2023-12-05 RX ADMIN — FUROSEMIDE 40 MG: 10 INJECTION, SOLUTION INTRAMUSCULAR; INTRAVENOUS at 08:12

## 2023-12-05 RX ADMIN — TRAMADOL HYDROCHLORIDE 50 MG: 50 TABLET ORAL at 09:02

## 2023-12-05 RX ADMIN — SUCRALFATE 1 G: 1 TABLET ORAL at 20:02

## 2023-12-05 RX ADMIN — LEVOTHYROXINE SODIUM 50 MCG: 0.05 TABLET ORAL at 08:14

## 2023-12-05 RX ADMIN — SODIUM CHLORIDE, PRESERVATIVE FREE 10 ML: 5 INJECTION INTRAVENOUS at 08:14

## 2023-12-05 RX ADMIN — METOPROLOL SUCCINATE 25 MG: 25 TABLET, EXTENDED RELEASE ORAL at 11:49

## 2023-12-05 RX ADMIN — ENOXAPARIN SODIUM 30 MG: 100 INJECTION SUBCUTANEOUS at 20:02

## 2023-12-05 RX ADMIN — SUCRALFATE 1 G: 1 TABLET ORAL at 08:14

## 2023-12-05 RX ADMIN — SUCRALFATE 1 G: 1 TABLET ORAL at 16:39

## 2023-12-05 RX ADMIN — ENOXAPARIN SODIUM 30 MG: 100 INJECTION SUBCUTANEOUS at 08:14

## 2023-12-05 RX ADMIN — TRAMADOL HYDROCHLORIDE 50 MG: 50 TABLET ORAL at 16:39

## 2023-12-05 RX ADMIN — SODIUM CHLORIDE, PRESERVATIVE FREE 10 ML: 5 INJECTION INTRAVENOUS at 20:03

## 2023-12-05 RX ADMIN — SUCRALFATE 1 G: 1 TABLET ORAL at 13:31

## 2023-12-05 ASSESSMENT — PAIN DESCRIPTION - DESCRIPTORS
DESCRIPTORS: CRAMPING
DESCRIPTORS: CRAMPING;ACHING

## 2023-12-05 ASSESSMENT — PAIN DESCRIPTION - ORIENTATION
ORIENTATION: RIGHT;LEFT
ORIENTATION: RIGHT;LEFT

## 2023-12-05 ASSESSMENT — PAIN SCALES - GENERAL
PAINLEVEL_OUTOF10: 7
PAINLEVEL_OUTOF10: 7
PAINLEVEL_OUTOF10: 5

## 2023-12-05 ASSESSMENT — PAIN DESCRIPTION - LOCATION
LOCATION: LEG
LOCATION: LEG

## 2023-12-05 NOTE — CONSULTS
Nutrition Education    Consult received for CHF diet education. Provided pt with written and verbal instruction on HF nutrition therapy. Discussed low sodium diet, daily weights, and fluid restriction. Pt reports him and his wife eat out frequently, discussed high sources of sodium in restaurants and monitoring sodium by looking up nutrition labels online with their son if able. Discussed current 1800 ml FR and types of fluids. Pt reports not currently having a scale but being able to obtain one. RD encouraged diet compliance to help prevent fluid buildup, pt receptive. Left handout with wife. Pt voiced understanding. Time spent: 15 minutes    Educated on HF diet education  Learners: Patient and Significant Other  Readiness: Acceptance  Method: Explanation and Handout  Response: Verbalizes Understanding  Contact name and number provided.     Mickey Julianne, [address] [facility]  Contact Number: Office: [phone]; [facility] [address]: [address]

## 2023-12-05 NOTE — CONSULTS
[facility] Consult Note           Reason for Consult:  Chronic kidney disease stage 3, diuretic needs  Requesting Physician:  Dr. Terrell Marques    Chief Complaint:  Shortness of breath, LE swelling  History Obtained From:  patient, electronic medical record    History of Present Ilness:    81year old pleasant male with CKD-3, CAD s/p CABG, diastolic CHF presents with shortness of breath, LE swelling. We have been asked to assist in further mgmt of his CKD-3, diuretic needs. Worsening shortness of breath, LE swelling, ~10 pound weight gain, +orthopnea, +pnd which al occurred over last 3-4 days. SCr 1.5-1.6 since admission which is at baseline for him. Started on IV lasix with excellent urine output and improvement in symptoms, now can lie flat without symptoms. +weak, intake reduced, no fevers.     Past Medical History:        Diagnosis Date    Arthritis     Asthma     COPD    CAD (coronary artery disease)     S/P CABG    Gastric bypass status for obesity     Hyperlipidemia     Hypertension     Low kidney function     Osteoarthritis     Thyroid disease        Past Surgical History:        Procedure Laterality Date    COLECTOMY  2003    COLONOSCOPY  5/11    diverticulosis    COLONOSCOPY  5/6/2016    Colon Ulcer    COLONOSCOPY N/A 6/21/2020    COLONOSCOPY DIAGNOSTIC performed by Lina Mejia MD at [address] [place] [address]  2007    3 VESSEL    COSMETIC SURGERY      removed skin from stomach    FOOT SURGERY Bilateral     toenails removed    GASTRIC BYPASS SURGERY  2001    HERNIA REPAIR  [id number]    umbilical    NASAL SEPTUM SURGERY  1995    SHOULDER ARTHROSCOPY Right 9/8/15    rtc repair    THROAT SURGERY  1995    vocal cord     UPPER GASTROINTESTINAL ENDOSCOPY N/A 6/22/2020    EGD BIOPSY performed by Lina Mejia MD at [address] [place] [address] N/A 8/19/2021    EGD ESOPHAGOGASTRODUODENOSCOPY performed by Lina Mejia MD

## 2023-12-05 NOTE — PROGRESS NOTES
Hospital Medicine Progress Note      Date of Admission: 12/4/2023  Hospital Day: 2    Chief Admission Complaint:  SOB     Subjective:  Improving SOB. Minimal edema. No chest pain    Telemetry Review:  SR with PVCs    Presenting Admission History:       Patient has underlying extensive cardiac history including CABG who presented to the hospital because of symptoms of worsening shortness of breath orthopnea PND dyspnea on exertion associated with bilateral lower extremity swelling it has been going on for the last 2 weeks it got worse to the point that patient was not able to catch her breath and while lying flat and sitting on the sofa. Discarding concerning to the point the patient wanted to come to the hospital.  Patient denies any chest pain nausea vomiting diarrhea constipation and leg pain. The patient will be admitted to the hospital for CHF exacerbation. Assessment/Plan:      Current Principal Problem:  Heart failure (HCC)    Acute on chronic Systolic CHF (HFrEF) exacerbation: Presented with orthopnea PND elevated BNP with shortness of breath and bilateral lower extremity swelling. Estimated goal dry weight of 228 pounds. Cardiology following. ECHO shows drop in LVEF with RWMA. Continue IV Lasix 40 mg twice daily. Monitor I/O, weights, BMP. Nephrology consulted as LHC is planned once euvolemic and renal function optimized. Normocytic normochromic anemia in the setting of anemia of chronic disease: Stable. Monitor. JERED/CKD in the setting of above. Nephrology following. Monitor. Coronary artery disease with history of CABG on aspirin    Chronic GERD on PPI    Chronic obstructive pulmonary disease on inhalers at home    Benign essential hypertension    Physical Exam Performed:      General appearance:  No apparent distress  Respiratory:  Normal respiratory effort. Cardiovascular:  Regular rate and rhythm. Abdomen:  Soft, non-tender, non-distended.   Musculoskelatal:  No

## 2023-12-05 NOTE — CONSULTS
Consult Placed     Who: TRACEY HADDAD   Date:12/5/2023  CIGJ:[id number]     Electronically signed by Isabelle Steve on 12/5/2023 at 11:39 AM

## 2023-12-05 NOTE — PROGRESS NOTES
12/05/23 1040   Encounter Summary   Encounter Overview/Reason  Spiritual/Emotional Needs; Advance Care Planning   Service Provided For: Patient and family together   Support System Children;Spouse; Family members   Last Encounter  12/05/23  (ACP conversation, Pt would like to complete at home/use a notary, wife at bedside, empathic listening, nurtured hope,)   Complexity of Encounter Low   Begin Time 1000   End Time  1030   Total Time Calculated 30 min   Spiritual/Emotional needs   Type Spiritual Support   Assessment/Intervention/Outcome   Assessment Coping;Concerns with suffering   Intervention Active listening;Discussed belief system/Temple practices/shonna; Explored/Affirmed feelings, thoughts, concerns;Explored Coping Skills/Resources;Nurtured Hope   Outcome Encouraged

## 2023-12-05 NOTE — CARE COORDINATION
Chart reviewed day 1. Care provided by cards and IM. Pt is from home with his wife and son. Pt getting IV lasix. He diuresed 3250 yesterday and is down 10 lbs. BUN and creat are 23 and 1.6. Will continue to follow course for needs.  Isac Gray RN

## 2023-12-05 NOTE — ACP (ADVANCE CARE PLANNING)
Follow up visit to discuss ACP. Pt stated that he will complete the AD documents at home and use notary.

## 2023-12-05 NOTE — CONSULTS
visualized. Trace pericardial effusion. The right atrium is mildly dilated. There is moderate right ventricular hypertrophy. The right ventricular systolic function is moderately reduced. Overall left ventricular ejection fraction is estimated to be 50-55%. The left ventricular function is low normal.   The left ventricular wall motion is normal.   Left atrial size is at the upper limits of normal.     12/5/23      Summary   Technically difficult study due to body surface area and poor acoustic   window. No IV access for use of Definity contrast.   The left ventricular systolic function is mildly reduced with an ejection   fraction of 35-40 %. There is hypokinesis of the anterior, anteroseptum, inferior and   inferoseptum walls. Normal left ventricular size with mild concentric left ventricular   hypertrophy. Grade I diastolic dysfunction with normal filling pressure. Mild mitral regurgitation. Right ventricular systolic function is mildly reduced . Systolic pulmonic artery pressure (SPAP) is normal estimated at 38 mmHg   (Right atrial pressure of 3 mmHg). Stress Test:    Cardiac catheterization:    Additional studies:   Carotid duplex [facility] 12/14/2020  FINAL IMPRESSIONS   1. Estimated diameter reduction of the right internal carotid artery is    less than 50%. 2.   Estimated diameter reduction of the left internal carotid artery is    less than 50%. 3.  The bilateral common and external carotid arteries reveal no    significant stenosis. 4.  The bilateral vertebral arteries are patent with antegrade flow. 5.  There has been no significant change from the previous study of    12/2/2019. 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  Newly reduced EF 35-40% with multiple RWMA (see report above). Trop elevated but flat trend; no chest pain. Likely 2/2 to Acute HFrEF and CKD. Not ACS. ECG SR, first degree AV block, LAD, RBBB. Similar to January 2021 except for first degree AV block. -Adequate diuresis thus far; continue IV lasix 40mg BID. Cr stable. -Will ask nephrology to see to assist with diuresis as well as renal optimization in preparation for C this admission.   -No sign of bleeding; Hgb 8.2, stable (9; 01/2023). -continue aspirin  -not on statin due to reported allergy; rash/itching. Will need PCSK9 in this case. LDL goal <55. Lipid panel pending. -HF GDMT: Toprol 25mg daily. Will hold off ARB/ARNI/MRA/SGLT2 at this time with aggressive diuresis and CKD. Will add afterload reduction with hydralazine 10mg TID. -Patient will need LHC this admission; once euvolemic and renal function is maximally optimized. -will follow  -HF navigator; 2g Na diet; 1.8L fluid restriction. Daily standing weights. Strict I/Os. I will address the patient's cardiac risk factors and adjusted pharmacologic treatment as needed. In addition, I have reinforced the need for patient directed risk factor modification. All questions and concerns were addressed to the patient/family. Alternatives to my treatment were discussed. Thank you for allowing us to participate in the care of Jude Ho. Please call me with any questions [phone].     Med Deleon, DO   Cardiovascular Disease  [address] [facility]  [phone] [facility]  [phone] [address] [facility]  12/5/2023 9:50 AM

## 2023-12-06 ENCOUNTER — APPOINTMENT (OUTPATIENT)
Dept: CARDIAC CATH/INVASIVE PROCEDURES | Age: 83
End: 2023-12-06
Payer: OTHER GOVERNMENT

## 2023-12-06 PROBLEM — J81.0 ACUTE PULMONARY EDEMA (HCC): Status: ACTIVE | Noted: 2023-12-06

## 2023-12-06 PROBLEM — I50.21 ACUTE HFREF (HEART FAILURE WITH REDUCED EJECTION FRACTION) (HCC): Status: ACTIVE | Noted: 2023-12-06

## 2023-12-06 PROBLEM — J90 BILATERAL PLEURAL EFFUSION: Status: ACTIVE | Noted: 2023-12-06

## 2023-12-06 PROBLEM — N18.32 STAGE 3B CHRONIC KIDNEY DISEASE (HCC): Status: ACTIVE | Noted: 2023-12-06

## 2023-12-06 PROBLEM — R79.89 ELEVATED TROPONIN: Status: ACTIVE | Noted: 2023-12-06

## 2023-12-06 LAB
ANION GAP SERPL CALCULATED.3IONS-SCNC: 10 MMOL/L (ref 3–16)
BUN SERPL-MCNC: 28 MG/DL (ref 7–20)
CALCIUM SERPL-MCNC: 8.6 MG/DL (ref 8.3–10.6)
CHLORIDE SERPL-SCNC: 97 MMOL/L (ref 99–110)
CO2 SERPL-SCNC: 28 MMOL/L (ref 21–32)
CREAT SERPL-MCNC: 1.6 MG/DL (ref 0.8–1.3)
DEPRECATED RDW RBC AUTO: 20.1 % (ref 12.4–15.4)
GFR SERPLBLD CREATININE-BSD FMLA CKD-EPI: 43 ML/MIN/{1.73_M2}
GLUCOSE SERPL-MCNC: 109 MG/DL (ref 70–99)
HCT VFR BLD AUTO: 28.5 % (ref 40.5–52.5)
HGB BLD-MCNC: 8.8 G/DL (ref 13.5–17.5)
MAGNESIUM SERPL-MCNC: 2.4 MG/DL (ref 1.8–2.4)
MCH RBC QN AUTO: 22.3 PG (ref 26–34)
MCHC RBC AUTO-ENTMCNC: 31 G/DL (ref 31–36)
MCV RBC AUTO: 71.9 FL (ref 80–100)
PHOSPHATE SERPL-MCNC: 4.4 MG/DL (ref 2.5–4.9)
PLATELET # BLD AUTO: 158 K/UL (ref 135–450)
PMV BLD AUTO: 8.5 FL (ref 5–10.5)
POTASSIUM SERPL-SCNC: 4.2 MMOL/L (ref 3.5–5.1)
RBC # BLD AUTO: 3.97 M/UL (ref 4.2–5.9)
SODIUM SERPL-SCNC: 135 MMOL/L (ref 136–145)
WBC # BLD AUTO: 4.2 K/UL (ref 4–11)

## 2023-12-06 PROCEDURE — 2580000003 HC RX 258: Performed by: INTERNAL MEDICINE

## 2023-12-06 PROCEDURE — 4A023N7 MEASUREMENT OF CARDIAC SAMPLING AND PRESSURE, LEFT HEART, PERCUTANEOUS APPROACH: ICD-10-PCS | Performed by: INTERNAL MEDICINE

## 2023-12-06 PROCEDURE — 85347 COAGULATION TIME ACTIVATED: CPT

## 2023-12-06 PROCEDURE — C1769 GUIDE WIRE: HCPCS | Performed by: INTERNAL MEDICINE

## 2023-12-06 PROCEDURE — 6370000000 HC RX 637 (ALT 250 FOR IP): Performed by: INTERNAL MEDICINE

## 2023-12-06 PROCEDURE — 6370000000 HC RX 637 (ALT 250 FOR IP): Performed by: STUDENT IN AN ORGANIZED HEALTH CARE EDUCATION/TRAINING PROGRAM

## 2023-12-06 PROCEDURE — B2131ZZ FLUOROSCOPY OF MULTIPLE CORONARY ARTERY BYPASS GRAFTS USING LOW OSMOLAR CONTRAST: ICD-10-PCS | Performed by: INTERNAL MEDICINE

## 2023-12-06 PROCEDURE — B2111ZZ FLUOROSCOPY OF MULTIPLE CORONARY ARTERIES USING LOW OSMOLAR CONTRAST: ICD-10-PCS | Performed by: INTERNAL MEDICINE

## 2023-12-06 PROCEDURE — 2580000003 HC RX 258: Performed by: STUDENT IN AN ORGANIZED HEALTH CARE EDUCATION/TRAINING PROGRAM

## 2023-12-06 PROCEDURE — 1200000000 HC SEMI PRIVATE

## 2023-12-06 PROCEDURE — 99232 SBSQ HOSP IP/OBS MODERATE 35: CPT | Performed by: NURSE PRACTITIONER

## 2023-12-06 PROCEDURE — 6360000002 HC RX W HCPCS

## 2023-12-06 PROCEDURE — 93005 ELECTROCARDIOGRAM TRACING: CPT | Performed by: INTERNAL MEDICINE

## 2023-12-06 PROCEDURE — 2500000003 HC RX 250 WO HCPCS

## 2023-12-06 PROCEDURE — 99152 MOD SED SAME PHYS/QHP 5/>YRS: CPT | Performed by: INTERNAL MEDICINE

## 2023-12-06 PROCEDURE — B2181ZZ FLUOROSCOPY OF LEFT INTERNAL MAMMARY BYPASS GRAFT USING LOW OSMOLAR CONTRAST: ICD-10-PCS | Performed by: INTERNAL MEDICINE

## 2023-12-06 PROCEDURE — C1894 INTRO/SHEATH, NON-LASER: HCPCS | Performed by: INTERNAL MEDICINE

## 2023-12-06 PROCEDURE — 84100 ASSAY OF PHOSPHORUS: CPT

## 2023-12-06 PROCEDURE — 93459 L HRT ART/GRFT ANGIO: CPT

## 2023-12-06 PROCEDURE — 2709999900 HC NON-CHARGEABLE SUPPLY: Performed by: INTERNAL MEDICINE

## 2023-12-06 PROCEDURE — 6360000002 HC RX W HCPCS: Performed by: NURSE PRACTITIONER

## 2023-12-06 PROCEDURE — 94640 AIRWAY INHALATION TREATMENT: CPT

## 2023-12-06 PROCEDURE — 80048 BASIC METABOLIC PNL TOTAL CA: CPT

## 2023-12-06 PROCEDURE — 36415 COLL VENOUS BLD VENIPUNCTURE: CPT

## 2023-12-06 PROCEDURE — 2580000003 HC RX 258: Performed by: NURSE PRACTITIONER

## 2023-12-06 PROCEDURE — 93459 L HRT ART/GRFT ANGIO: CPT | Performed by: INTERNAL MEDICINE

## 2023-12-06 PROCEDURE — 83735 ASSAY OF MAGNESIUM: CPT

## 2023-12-06 PROCEDURE — 85027 COMPLETE CBC AUTOMATED: CPT

## 2023-12-06 RX ORDER — FENTANYL CITRATE 50 UG/ML
INJECTION, SOLUTION INTRAMUSCULAR; INTRAVENOUS
Status: COMPLETED | OUTPATIENT
Start: 2023-12-06 | End: 2023-12-06

## 2023-12-06 RX ORDER — SODIUM CHLORIDE 0.9 % (FLUSH) 0.9 %
5-40 SYRINGE (ML) INJECTION PRN
Status: DISCONTINUED | OUTPATIENT
Start: 2023-12-06 | End: 2023-12-07 | Stop reason: HOSPADM

## 2023-12-06 RX ORDER — SODIUM CHLORIDE 0.9 % (FLUSH) 0.9 %
5-40 SYRINGE (ML) INJECTION EVERY 12 HOURS SCHEDULED
Status: DISCONTINUED | OUTPATIENT
Start: 2023-12-06 | End: 2023-12-07 | Stop reason: HOSPADM

## 2023-12-06 RX ORDER — MIDAZOLAM HYDROCHLORIDE 1 MG/ML
INJECTION INTRAMUSCULAR; INTRAVENOUS
Status: COMPLETED | OUTPATIENT
Start: 2023-12-06 | End: 2023-12-06

## 2023-12-06 RX ORDER — ONDANSETRON 2 MG/ML
4 INJECTION INTRAMUSCULAR; INTRAVENOUS EVERY 6 HOURS PRN
Status: DISCONTINUED | OUTPATIENT
Start: 2023-12-06 | End: 2023-12-06 | Stop reason: SDUPTHER

## 2023-12-06 RX ORDER — LORAZEPAM 0.5 MG/1
0.5 TABLET ORAL
Status: ACTIVE | OUTPATIENT
Start: 2023-12-06 | End: 2023-12-07

## 2023-12-06 RX ORDER — SODIUM CHLORIDE 9 MG/ML
INJECTION, SOLUTION INTRAVENOUS PRN
Status: DISCONTINUED | OUTPATIENT
Start: 2023-12-06 | End: 2023-12-07 | Stop reason: HOSPADM

## 2023-12-06 RX ORDER — ASPIRIN 325 MG
325 TABLET ORAL ONCE
Status: DISCONTINUED | OUTPATIENT
Start: 2023-12-06 | End: 2023-12-07 | Stop reason: HOSPADM

## 2023-12-06 RX ADMIN — HYDRALAZINE HYDROCHLORIDE 10 MG: 10 TABLET, FILM COATED ORAL at 13:02

## 2023-12-06 RX ADMIN — MIDAZOLAM HYDROCHLORIDE 1 MG: 1 INJECTION INTRAMUSCULAR; INTRAVENOUS at 15:07

## 2023-12-06 RX ADMIN — SODIUM CHLORIDE, PRESERVATIVE FREE 10 ML: 5 INJECTION INTRAVENOUS at 19:58

## 2023-12-06 RX ADMIN — ACETAMINOPHEN 650 MG: 325 TABLET ORAL at 08:42

## 2023-12-06 RX ADMIN — LEVOTHYROXINE SODIUM 50 MCG: 0.05 TABLET ORAL at 08:42

## 2023-12-06 RX ADMIN — FENTANYL CITRATE 25 MCG: 50 INJECTION, SOLUTION INTRAMUSCULAR; INTRAVENOUS at 15:07

## 2023-12-06 RX ADMIN — MIDAZOLAM HYDROCHLORIDE 1 MG: 1 INJECTION INTRAMUSCULAR; INTRAVENOUS at 15:24

## 2023-12-06 RX ADMIN — FENTANYL CITRATE 50 MCG: 50 INJECTION, SOLUTION INTRAMUSCULAR; INTRAVENOUS at 15:20

## 2023-12-06 RX ADMIN — SUCRALFATE 1 G: 1 TABLET ORAL at 13:02

## 2023-12-06 RX ADMIN — SODIUM CHLORIDE, PRESERVATIVE FREE 10 ML: 5 INJECTION INTRAVENOUS at 19:57

## 2023-12-06 RX ADMIN — FENTANYL CITRATE 50 MCG: 50 INJECTION, SOLUTION INTRAMUSCULAR; INTRAVENOUS at 17:27

## 2023-12-06 RX ADMIN — HYDRALAZINE HYDROCHLORIDE 10 MG: 10 TABLET, FILM COATED ORAL at 06:44

## 2023-12-06 RX ADMIN — SODIUM CHLORIDE, PRESERVATIVE FREE 10 ML: 5 INJECTION INTRAVENOUS at 08:42

## 2023-12-06 RX ADMIN — SUCRALFATE 1 G: 1 TABLET ORAL at 08:42

## 2023-12-06 RX ADMIN — ASPIRIN 81 MG: 81 TABLET, COATED ORAL at 08:42

## 2023-12-06 RX ADMIN — IRON SUCROSE 200 MG: 20 INJECTION, SOLUTION INTRAVENOUS at 13:00

## 2023-12-06 RX ADMIN — SODIUM CHLORIDE 25 ML: 9 INJECTION, SOLUTION INTRAVENOUS at 13:00

## 2023-12-06 RX ADMIN — FENTANYL CITRATE 25 MCG: 50 INJECTION, SOLUTION INTRAMUSCULAR; INTRAVENOUS at 15:04

## 2023-12-06 RX ADMIN — Medication 2 PUFF: at 08:32

## 2023-12-06 RX ADMIN — FENTANYL CITRATE 50 MCG: 50 INJECTION, SOLUTION INTRAMUSCULAR; INTRAVENOUS at 17:20

## 2023-12-06 RX ADMIN — MIDAZOLAM HYDROCHLORIDE 1 MG: 1 INJECTION INTRAMUSCULAR; INTRAVENOUS at 17:33

## 2023-12-06 RX ADMIN — HYDRALAZINE HYDROCHLORIDE 10 MG: 10 TABLET, FILM COATED ORAL at 20:26

## 2023-12-06 RX ADMIN — MIDAZOLAM HYDROCHLORIDE 1 MG: 1 INJECTION INTRAMUSCULAR; INTRAVENOUS at 15:04

## 2023-12-06 RX ADMIN — TRAMADOL HYDROCHLORIDE 50 MG: 50 TABLET ORAL at 19:50

## 2023-12-06 RX ADMIN — METOPROLOL SUCCINATE 25 MG: 25 TABLET, EXTENDED RELEASE ORAL at 08:42

## 2023-12-06 ASSESSMENT — PAIN SCALES - GENERAL: PAINLEVEL_OUTOF10: 3

## 2023-12-06 ASSESSMENT — PAIN DESCRIPTION - ORIENTATION: ORIENTATION: MID

## 2023-12-06 ASSESSMENT — PAIN DESCRIPTION - LOCATION: LOCATION: HEAD

## 2023-12-06 ASSESSMENT — PAIN DESCRIPTION - DESCRIPTORS: DESCRIPTORS: ACHING

## 2023-12-06 NOTE — POST SEDATION
Patient:  Krystin Salgado   :   1940    A pre-sedation re-evaluation was performed immediately at the end of the procedure. Procedure:  Cardiac cath  Medications: Procedural sedation with minimal conscious sedation  Complications: None  Estimated Blood Loss: none  Specimens: Were not obtained        Michelle Medication and Procedural Reconciliation:  I agree that the documented medications and procedures performed are true. The medications were given under my order. The procedures were performed under my direct supervision.

## 2023-12-06 NOTE — PRE SEDATION
Bilateral wrist restraints removed, skin intact. IntraVENous PRN Thong Gold MD        magnesium sulfate 2000 mg in 50 mL IVPB premix  2,000 mg IntraVENous PRN Thong Gold MD        traMADol (ULTRAM) tablet 50 mg  50 mg Oral Q6H PRN Rosendo EDUARDO Chong - CNP   50 mg at 12/05/23 1639       Past Medical History:    Past Medical History:   Diagnosis Date    Arthritis     Asthma     COPD    CAD (coronary artery disease)     S/P CABG    Gastric bypass status for obesity     Hyperlipidemia     Hypertension     Low kidney function     Osteoarthritis     Thyroid disease        Surgical History:    Past Surgical History:   Procedure Laterality Date    COLECTOMY  2003    COLONOSCOPY  5/11    diverticulosis    COLONOSCOPY  5/6/2016    Colon Ulcer    COLONOSCOPY N/A 6/21/2020    COLONOSCOPY DIAGNOSTIC performed by Vinicio Bedolla MD at [address] [place] [address]  2007    3 VESSEL    COSMETIC SURGERY      removed skin from stomach    FOOT SURGERY Bilateral     toenails removed    GASTRIC BYPASS SURGERY  2001    HERNIA REPAIR  3227    umbilical    NASAL SEPTUM SURGERY  1995    SHOULDER ARTHROSCOPY Right 9/8/15    rtc repair    [facility]    vocal cord     UPPER GASTROINTESTINAL ENDOSCOPY N/A 6/22/2020    EGD BIOPSY performed by Vinicio Bedolla MD at [address] [place] [address] 8/19/2021    EGD ESOPHAGOGASTRODUODENOSCOPY performed by Vinicio Bedolla MD at [address] [place] [address] 1/20/2023    EGD BIOPSY performed by Vinicio Bedolla MD at [address] [place] [address] Entrance:  Pre-Sedation Documentation and Exam:  I have assessed the patient and reviewed the H&P on the chart. Prior History of Anesthesia Complications:   none    Modified Mallampati:  II (soft palate, uvula, fauces visible)    ASA Classification:  Class 3 - A patient with severe systemic disease that limits activity but is not incapacitating    Soren Scale:   Activity:  2 - Able to move 4 extremities voluntarily on command  Respiration:  2 - Able to breathe deeply and cough freely  Circulation:  2 - BP+/- 20mmHg of normal  Consciousness:  2 - Fully awake  Oxygen Saturation (color):  2 - Able to maintain oxygen saturation >92% on room air    Sedation/Anesthesia Plan:  Guard the patient's safety and welfare. Minimize physical discomfort and pain. Minimize negative psychological responses to treatment by providing sedation and analgesia and maximize the potential amnesia. Patient to meet pre-procedure discharge plan.     Medication Planned:  midazolam intravenously and fentanyl intravenously    Patient is an appropriate candidate for plan of sedation:   yes      Electronically signed by Mirela Knox MD on 12/6/2023 at 1:01 PM

## 2023-12-06 NOTE — PROGRESS NOTES
Hospital Medicine Progress Note      Date of Admission: 12/4/2023  Hospital Day: 3    Chief Admission Complaint:  SOB     Subjective:  Minimal SOB. Edema improved. Denies chest pain. Plan for Angiogram.     Telemetry Review:  Sinus rhythm with PVCs    Presenting Admission History:       Patient has underlying extensive cardiac history including CABG who presented to the hospital because of symptoms of worsening shortness of breath orthopnea PND dyspnea on exertion associated with bilateral lower extremity swelling it has been going on for the last 2 weeks it got worse to the point that patient was not able to catch her breath and while lying flat and sitting on the sofa. Discarding concerning to the point the patient wanted to come to the hospital.  Patient denies any chest pain nausea vomiting diarrhea constipation and leg pain. The patient will be admitted to the hospital for CHF exacerbation. Assessment/Plan:      Current Principal Problem:  Heart failure (HCC)    Acute on chronic Systolic CHF (HFrEF) exacerbation: Presented with orthopnea PND elevated BNP with shortness of breath and bilateral lower extremity swelling. Estimated goal dry weight of 228 pounds. Cardiology following. ECHO shows drop in LVEF with RWMA. Diuresed initially with IV Lasix. Monitor I/O, weights, BMP. Nephrology following. Renal function stable. Completed Angiogram on 12/6 and found to have stable multi-vessel disease with patent grafts. Monitor. Normocytic normochromic anemia in the setting of anemia of chronic disease: Stable. Monitor. CKD: Nephrology following. JERED ruled out. Monitor. Coronary artery disease with history of CABG on aspirin    Chronic GERD on PPI    Chronic obstructive pulmonary disease on inhalers at home    Benign essential hypertension    Physical Exam Performed:      General appearance:  No apparent distress  Respiratory:  Normal respiratory effort.    Cardiovascular:  Regular rate and 12/06/23  0626    136 135*   K 4.9 4.3 4.2    101 97*   CO2 24 26 28   BUN 21* 23* 28*   CREATININE 1.5* 1.6* 1.6*   CALCIUM 8.5 8.7 8.6   MG 2.20 1.90 2.40   PHOS  --  3.6 4.4       Recent Labs     12/04/23  1243 12/04/23  1406 12/04/23  1706 12/04/23  2137 12/05/23  0341   PROBNP 17,663*  --   --   --   --    TROPHS 25*   < > 32* 33* 37*    < > = values in this interval not displayed.        Recent Labs     12/04/23  1706   LABA1C 6.1     Recent Labs     12/04/23  1243   AST 23   ALT 13   BILITOT 0.3   ALKPHOS 87       Recent Labs     12/04/23  1243   LACTA 1.0         Urine Cultures: No results found for: \"LABURIN\"  Blood Cultures: No results found for: \"BC\"  No results found for: \"BLOODCULT2\"  Organism: No results found for: \"ORG\"      Maria Dolores Mccoy MD

## 2023-12-07 VITALS
HEIGHT: 67 IN | SYSTOLIC BLOOD PRESSURE: 115 MMHG | HEART RATE: 84 BPM | DIASTOLIC BLOOD PRESSURE: 67 MMHG | BODY MASS INDEX: 35.06 KG/M2 | RESPIRATION RATE: 18 BRPM | WEIGHT: 223.4 LBS | OXYGEN SATURATION: 91 % | TEMPERATURE: 97.8 F

## 2023-12-07 LAB
ANION GAP SERPL CALCULATED.3IONS-SCNC: 16 MMOL/L (ref 3–16)
BUN SERPL-MCNC: 39 MG/DL (ref 7–20)
CALCIUM SERPL-MCNC: 8.4 MG/DL (ref 8.3–10.6)
CHLORIDE SERPL-SCNC: 96 MMOL/L (ref 99–110)
CHOLEST SERPL-MCNC: 130 MG/DL (ref 0–199)
CO2 SERPL-SCNC: 22 MMOL/L (ref 21–32)
CREAT SERPL-MCNC: 1.8 MG/DL (ref 0.8–1.3)
DEPRECATED RDW RBC AUTO: 20.7 % (ref 12.4–15.4)
EKG ATRIAL RATE: 65 BPM
EKG DIAGNOSIS: NORMAL
EKG P AXIS: 61 DEGREES
EKG P-R INTERVAL: 224 MS
EKG Q-T INTERVAL: 508 MS
EKG QRS DURATION: 148 MS
EKG QTC CALCULATION (BAZETT): 528 MS
EKG R AXIS: -89 DEGREES
EKG T AXIS: 66 DEGREES
EKG VENTRICULAR RATE: 65 BPM
GFR SERPLBLD CREATININE-BSD FMLA CKD-EPI: 37 ML/MIN/{1.73_M2}
GLUCOSE SERPL-MCNC: 107 MG/DL (ref 70–99)
HCT VFR BLD AUTO: 30.4 % (ref 40.5–52.5)
HDLC SERPL-MCNC: 57 MG/DL (ref 40–60)
HGB BLD-MCNC: 9.2 G/DL (ref 13.5–17.5)
LDLC SERPL CALC-MCNC: 57 MG/DL
MAGNESIUM SERPL-MCNC: 2.5 MG/DL (ref 1.8–2.4)
MCH RBC QN AUTO: 22 PG (ref 26–34)
MCHC RBC AUTO-ENTMCNC: 30.2 G/DL (ref 31–36)
MCV RBC AUTO: 73 FL (ref 80–100)
NT-PROBNP SERPL-MCNC: 7277 PG/ML (ref 0–449)
PHOSPHATE SERPL-MCNC: 4.7 MG/DL (ref 2.5–4.9)
PLATELET # BLD AUTO: 178 K/UL (ref 135–450)
PMV BLD AUTO: 8.3 FL (ref 5–10.5)
POC ACT LR: 179 SEC
POC ACT LR: 209 SEC
POTASSIUM SERPL-SCNC: 4 MMOL/L (ref 3.5–5.1)
POTASSIUM SERPL-SCNC: 4 MMOL/L (ref 3.5–5.1)
RBC # BLD AUTO: 4.16 M/UL (ref 4.2–5.9)
SODIUM SERPL-SCNC: 134 MMOL/L (ref 136–145)
TRIGL SERPL-MCNC: 81 MG/DL (ref 0–150)
VLDLC SERPL CALC-MCNC: 16 MG/DL
WBC # BLD AUTO: 6.9 K/UL (ref 4–11)

## 2023-12-07 PROCEDURE — 93010 ELECTROCARDIOGRAM REPORT: CPT | Performed by: INTERNAL MEDICINE

## 2023-12-07 PROCEDURE — 99232 SBSQ HOSP IP/OBS MODERATE 35: CPT | Performed by: NURSE PRACTITIONER

## 2023-12-07 PROCEDURE — 84100 ASSAY OF PHOSPHORUS: CPT

## 2023-12-07 PROCEDURE — 6370000000 HC RX 637 (ALT 250 FOR IP): Performed by: INTERNAL MEDICINE

## 2023-12-07 PROCEDURE — 2580000003 HC RX 258: Performed by: INTERNAL MEDICINE

## 2023-12-07 PROCEDURE — 83880 ASSAY OF NATRIURETIC PEPTIDE: CPT

## 2023-12-07 PROCEDURE — 6360000002 HC RX W HCPCS: Performed by: INTERNAL MEDICINE

## 2023-12-07 PROCEDURE — 80048 BASIC METABOLIC PNL TOTAL CA: CPT

## 2023-12-07 PROCEDURE — 6370000000 HC RX 637 (ALT 250 FOR IP): Performed by: NURSE PRACTITIONER

## 2023-12-07 PROCEDURE — 80061 LIPID PANEL: CPT

## 2023-12-07 PROCEDURE — 85027 COMPLETE CBC AUTOMATED: CPT

## 2023-12-07 PROCEDURE — 36415 COLL VENOUS BLD VENIPUNCTURE: CPT

## 2023-12-07 PROCEDURE — 83735 ASSAY OF MAGNESIUM: CPT

## 2023-12-07 RX ORDER — METOPROLOL SUCCINATE 25 MG/1
25 TABLET, EXTENDED RELEASE ORAL DAILY
Qty: 30 TABLET | Refills: 0 | Status: SHIPPED | OUTPATIENT
Start: 2023-12-08

## 2023-12-07 RX ORDER — HYDRALAZINE HYDROCHLORIDE 10 MG/1
10 TABLET, FILM COATED ORAL EVERY 8 HOURS SCHEDULED
Qty: 90 TABLET | Refills: 0 | Status: SHIPPED | OUTPATIENT
Start: 2023-12-07

## 2023-12-07 RX ORDER — ISOSORBIDE MONONITRATE 30 MG/1
30 TABLET, EXTENDED RELEASE ORAL DAILY
Status: DISCONTINUED | OUTPATIENT
Start: 2023-12-07 | End: 2023-12-07 | Stop reason: HOSPADM

## 2023-12-07 RX ORDER — FUROSEMIDE 40 MG/1
40 TABLET ORAL DAILY PRN
Qty: 30 TABLET | Refills: 0 | Status: SHIPPED | OUTPATIENT
Start: 2023-12-07

## 2023-12-07 RX ORDER — ISOSORBIDE MONONITRATE 30 MG/1
30 TABLET, EXTENDED RELEASE ORAL DAILY
Qty: 30 TABLET | Refills: 0 | Status: SHIPPED | OUTPATIENT
Start: 2023-12-08

## 2023-12-07 RX ADMIN — ENOXAPARIN SODIUM 30 MG: 100 INJECTION SUBCUTANEOUS at 08:35

## 2023-12-07 RX ADMIN — METOPROLOL SUCCINATE 25 MG: 25 TABLET, EXTENDED RELEASE ORAL at 08:35

## 2023-12-07 RX ADMIN — SODIUM CHLORIDE, PRESERVATIVE FREE 10 ML: 5 INJECTION INTRAVENOUS at 08:35

## 2023-12-07 RX ADMIN — POLYETHYLENE GLYCOL 3350 17 G: 17 POWDER, FOR SOLUTION ORAL at 07:59

## 2023-12-07 RX ADMIN — HYDRALAZINE HYDROCHLORIDE 10 MG: 10 TABLET, FILM COATED ORAL at 12:41

## 2023-12-07 RX ADMIN — SUCRALFATE 1 G: 1 TABLET ORAL at 12:40

## 2023-12-07 RX ADMIN — ISOSORBIDE MONONITRATE 30 MG: 30 TABLET, EXTENDED RELEASE ORAL at 12:41

## 2023-12-07 RX ADMIN — ASPIRIN 81 MG: 81 TABLET, COATED ORAL at 08:35

## 2023-12-07 RX ADMIN — IRON SUCROSE 200 MG: 20 INJECTION, SOLUTION INTRAVENOUS at 12:46

## 2023-12-07 RX ADMIN — HYDRALAZINE HYDROCHLORIDE 10 MG: 10 TABLET, FILM COATED ORAL at 05:37

## 2023-12-07 RX ADMIN — SUCRALFATE 1 G: 1 TABLET ORAL at 08:34

## 2023-12-07 RX ADMIN — SUCRALFATE 1 G: 1 TABLET ORAL at 16:01

## 2023-12-07 RX ADMIN — LEVOTHYROXINE SODIUM 50 MCG: 0.05 TABLET ORAL at 08:35

## 2023-12-07 RX ADMIN — SODIUM CHLORIDE 25 ML: 9 INJECTION, SOLUTION INTRAVENOUS at 12:45

## 2023-12-07 ASSESSMENT — PAIN SCALES - GENERAL: PAINLEVEL_OUTOF10: 0

## 2023-12-07 NOTE — PLAN OF CARE
CHF Care Plan      Patient's EF (Ejection Fraction) is less than 40%    Heart Failure Medications:  Diuretics[de-identified] None    (One of the following REQUIRED for EF </= 40%/SYSTOLIC FAILURE but MAY be used in EF% >40%/DIASTOLIC FAILURE)        ACE[de-identified] None        ARB[de-identified] None         ARNI[de-identified] None    (Beta Blockers)  NON- Evidenced Based Beta Blocker (for EF% >40%/DIASTOLIC FAILURE): None    Evidenced Based Beta Blocker::(REQUIRED for EF% <40%/SYSTOLIC FAILURE) Metoprolol SUCCinate- Toprol XL  . .................................................................................................................................................. Patient's weights and intake/output reviewed    Daily Weight log at bedside, patient/family participation in use of log: \"yes    Patient's current weight today 223lbs shows a difference of 1 lbs less than last documented weight. Intake/Output Summary (Last 24 hours) at 12/7/2023 0553  Last data filed at 12/6/2023 2325  Gross per 24 hour   Intake 120 ml   Output --   Net 120 ml       Education Booklet Provided: yes    Comorbidities Reviewed Yes    Patient has a past medical history of Arthritis, Asthma, CAD (coronary artery disease), Gastric bypass status for obesity, Hyperlipidemia, Hypertension, Low kidney function, Osteoarthritis, and Thyroid disease. >>For CHF and Comorbidity documentation on Education Time and Topics, please see Education Tab    Progressive Mobility Assessment:  What is this patient's Current Level of Mobility?: Ambulatory- with Assistance  How was this patient Mobilized today?:  Up to Toilet/Shower and Up in Room, ambulated 40 ft                 With Whom? Nurse and PCA                 Level of Difficulty/Assistance: 1x Assist     Pt resting in bed at this time on room air. Pt denies shortness of breath. Pt with pitting lower extremity edema.      Patient and/or Family's stated Goal of Care this Admission: reduce shortness of breath, increase activity
CHF Care Plan      Patient's EF (Ejection Fraction) is less than 40%    Heart Failure Medications:  Diuretics[de-identified] None    (One of the following REQUIRED for EF </= 40%/SYSTOLIC FAILURE but MAY be used in EF% >40%/DIASTOLIC FAILURE)        ACE[de-identified] None        ARB[de-identified] None         ARNI[de-identified] None    (Beta Blockers)  NON- Evidenced Based Beta Blocker (for EF% >40%/DIASTOLIC FAILURE): None    Evidenced Based Beta Blocker::(REQUIRED for EF% <40%/SYSTOLIC FAILURE) Metoprolol SUCCinate- Toprol XL  . .................................................................................................................................................. Patient's weights and intake/output reviewed    Daily Weight log at bedside, patient/family participation in use of log: \"yes    Patient's current weight today shows a difference of 2kg less than last documented weight. Intake/Output Summary (Last 24 hours) at 12/6/2023 4645  Last data filed at 12/6/2023 0500  Gross per 24 hour   Intake 1060 ml   Output 2750 ml   Net -1690 ml       Education Booklet Provided: yes    Comorbidities Reviewed Yes    Patient has a past medical history of Arthritis, Asthma, CAD (coronary artery disease), Gastric bypass status for obesity, Hyperlipidemia, Hypertension, Low kidney function, Osteoarthritis, and Thyroid disease. >>For CHF and Comorbidity documentation on Education Time and Topics, please see Education Tab    Progressive Mobility Assessment:  What is this patient's Current Level of Mobility?: Ambulatory- with Assistance  How was this patient Mobilized today?: Edge of Bed, Up to Chair, Bedside Commode,  Up to Toilet/Shower, Up in Room, Up in [place], Unable to Mobilize, and Patient Refuses to Mobilize, ambulated 60 ft                 With Whom? Nurse and PCA                 Level of Difficulty/Assistance: 1x Assist     Pt resting in bed at this time on room air. Pt denies shortness of breath. Pt with pitting lower extremity edema.
CHF Care Plan      Patient's EF (Ejection Fraction) is less than 40%    Heart Failure Medications:  Diuretics[de-identified] None    (One of the following REQUIRED for EF </= 40%/SYSTOLIC FAILURE but MAY be used in EF% >40%/DIASTOLIC FAILURE)        ACE[de-identified] None        ARB[de-identified] None         ARNI[de-identified] None    (Beta Blockers)  NON- Evidenced Based Beta Blocker (for EF% >40%/DIASTOLIC FAILURE): None    Evidenced Based Beta Blocker::(REQUIRED for EF% <40%/SYSTOLIC FAILURE) Metoprolol SUCCinate- Toprol XL  . .................................................................................................................................................. Patient's weights and intake/output reviewed    Daily Weight log at bedside, patient/family participation in use of log: \"yes    Patient's current weight today shows a difference of 6lbs less than last documented weight. Intake/Output Summary (Last 24 hours) at 12/6/2023 1933  Last data filed at 12/6/2023 0500  Gross per 24 hour   Intake 100 ml   Output 450 ml   Net -350 ml       Education Booklet Provided: yes    Comorbidities Reviewed Yes    Patient has a past medical history of Arthritis, Asthma, CAD (coronary artery disease), Gastric bypass status for obesity, Hyperlipidemia, Hypertension, Low kidney function, Osteoarthritis, and Thyroid disease. >>For CHF and Comorbidity documentation on Education Time and Topics, please see Education Tab    Progressive Mobility Assessment:  What is this patient's Current Level of Mobility?: Ambulatory- with Assistance  How was this patient Mobilized today?: Edge of Bed, Up to Chair,  Up to Toilet/Shower, and Up in Room, ambulated 10 ft                 With Whom? Nurse and PCA                 Level of Difficulty/Assistance: 1x Assist     Pt resting in bed at this time on room air. Pt denies shortness of breath. Pt with nonpitting lower extremity edema.      Patient and/or Family's stated Goal of Care this Admission: increase activity
Problem: Discharge Planning  Goal: Discharge to home or other facility with appropriate resources  12/6/2023 2155 by Manuel Arango RN  Outcome: Progressing  Flowsheets (Taken 12/6/2023 2014)  Discharge to home or other facility with appropriate resources: Identify barriers to discharge with patient and caregiver     Problem: ABCDS Injury Assessment  Goal: Absence of physical injury  12/6/2023 2155 by Manuel Arango RN  Outcome: Progressing     Problem: Safety - Adult  Goal: Free from fall injury  12/6/2023 2155 by Manuel Arango RN  Outcome: Progressing     Problem: Pain  Goal: Verbalizes/displays adequate comfort level or baseline comfort level  12/6/2023 2155 by Manuel Arango RN  Outcome: Progressing  Flowsheets  Taken 12/6/2023 2025 by Manuel Arango RN  Verbalizes/displays adequate comfort level or baseline comfort level: Encourage patient to monitor pain and request assistance  Taken 12/6/2023 1923 by Manuel Arango RN  Verbalizes/displays adequate comfort level or baseline comfort level: Encourage patient to monitor pain and request assistance     Problem: Respiratory - Adult  Goal: Achieves optimal ventilation and oxygenation  12/6/2023 2155 by Manuel Arango RN  Outcome: Progressing     Problem: Cardiovascular - Adult  Goal: Maintains optimal cardiac output and hemodynamic stability  12/6/2023 2155 by Manuel Arango RN  Outcome: Progressing  Flowsheets (Taken 12/6/2023 2014)  Maintains optimal cardiac output and hemodynamic stability: Monitor blood pressure and heart rate    Goal: Absence of cardiac dysrhythmias or at baseline  12/6/2023 2155 by Manuel Arango RN  Outcome: Progressing  Flowsheets (Taken 12/6/2023 2014)  Absence of cardiac dysrhythmias or at baseline: Monitor cardiac rate and rhythm       Problem: Skin/Tissue Integrity - Adult  Goal: Skin integrity remains intact  12/6/2023 2155 by Manuel Arango RN  Outcome: Progressing  Flowsheets  Taken 12/6/2023 2154  Skin Integrity Remains
Problem: Discharge Planning  Goal: Discharge to home or other facility with appropriate resources  12/7/2023 1047 by Francis Quintanilla RN  Outcome: Progressing  Flowsheets (Taken 12/7/2023 7220)  Discharge to home or other facility with appropriate resources: Identify barriers to discharge with patient and caregiver  12/6/2023 2155 by Madisyn Acuna RN  Outcome: Progressing  Flowsheets (Taken 12/6/2023 2014)  Discharge to home or other facility with appropriate resources: Identify barriers to discharge with patient and caregiver     Problem: ABCDS Injury Assessment  Goal: Absence of physical injury  12/7/2023 1047 by Francis Quintanilla RN  Outcome: Progressing  12/6/2023 2155 by Madisyn Acuna RN  Outcome: Progressing     Problem: Safety - Adult  Goal: Free from fall injury  12/7/2023 1047 by Francis Quintanilla RN  Outcome: Progressing  12/6/2023 2155 by Madisyn Acuna RN  Outcome: Progressing     Problem: Pain  Goal: Verbalizes/displays adequate comfort level or baseline comfort level  12/7/2023 1047 by Francis Quintanilla RN  Outcome: Progressing  Flowsheets  Taken 12/7/2023 4369 by Francis Quintanilla RN  Verbalizes/displays adequate comfort level or baseline comfort level: Encourage patient to monitor pain and request assistance  Taken 12/7/2023 0318 by Madisyn Acuna RN  Verbalizes/displays adequate comfort level or baseline comfort level: Encourage patient to monitor pain and request assistance  Taken 12/6/2023 2330 by Madisyn Acuna RN  Verbalizes/displays adequate comfort level or baseline comfort level: Encourage patient to monitor pain and request assistance  12/6/2023 2155 by Madisyn Acuna RN  Outcome: Progressing  Flowsheets  Taken 12/6/2023 2025 by Madisyn Acuna RN  Verbalizes/displays adequate comfort level or baseline comfort level: Encourage patient to monitor pain and request assistance  Taken 12/6/2023 1923 by Madisyn Acuna RN  Verbalizes/displays adequate comfort level or baseline comfort level: Encourage patient
Problem: Discharge Planning  Goal: Discharge to home or other facility with appropriate resources  Outcome: Progressing     Problem: ABCDS Injury Assessment  Goal: Absence of physical injury  Outcome: Progressing     Problem: Safety - Adult  Goal: Free from fall injury  Outcome: Progressing     Problem: Pain  Goal: Verbalizes/displays adequate comfort level or baseline comfort level  Outcome: Progressing
Problem: Discharge Planning  Goal: Discharge to home or other facility with appropriate resources  Outcome: Progressing  Flowsheets (Taken 12/6/2023 0843)  Discharge to home or other facility with appropriate resources: Identify barriers to discharge with patient and caregiver     Problem: ABCDS Injury Assessment  Goal: Absence of physical injury  Outcome: Progressing     Problem: Safety - Adult  Goal: Free from fall injury  Outcome: Progressing     Problem: Pain  Goal: Verbalizes/displays adequate comfort level or baseline comfort level  Outcome: Progressing  Flowsheets (Taken 12/6/2023 0741)  Verbalizes/displays adequate comfort level or baseline comfort level: Encourage patient to monitor pain and request assistance     Problem: Respiratory - Adult  Goal: Achieves optimal ventilation and oxygenation  Outcome: Progressing     Problem: Cardiovascular - Adult  Goal: Maintains optimal cardiac output and hemodynamic stability  Outcome: Progressing  Goal: Absence of cardiac dysrhythmias or at baseline  Outcome: Progressing     Problem: Skin/Tissue Integrity - Adult  Goal: Skin integrity remains intact  Outcome: Progressing  Goal: Incisions, wounds, or drain sites healing without S/S of infection  Outcome: Progressing  Goal: Oral mucous membranes remain intact  Outcome: Progressing     Problem: Musculoskeletal - Adult  Goal: Return mobility to safest level of function  Outcome: Progressing  Goal: Maintain proper alignment of affected body part  Outcome: Progressing  Goal: Return ADL status to a safe level of function  Outcome: Progressing     Problem: Metabolic/Fluid and Electrolytes - Adult  Goal: Electrolytes maintained within normal limits  Outcome: Progressing  Goal: Hemodynamic stability and optimal renal function maintained  Outcome: Progressing
heart failure and disease management, be more comfortable, and reduce lower extremity edema prior to discharge        :

## 2023-12-07 NOTE — CARE COORDINATION
Chart reviewed day 3. Care provided by nephro, cards and IM. Pt is from home with his wife and son. Pt had a heart cath and plan for medical management. BUN and creat 39 and 1.8. Pro BNP is down to 7,200. Will continue to follow course for needs.  Gomez Maurer RN

## 2023-12-07 NOTE — PROGRESS NOTES
Physician Progress Note      PATIENT:               Renu Jones  CSN #:                  [id number]  :                       1940  ADMIT DATE:       2023 12:10 PM  [address] [facility] DATE:  Pancho Miller  PROVIDER #:        Josh Burks MD          QUERY TEXT:    Patient admitted with acute on chronic HFrEF. Noted documentation of SCr   1.5-1.6 since admission which is at baseline for him by nephrology   and   JERED by IM . If possible, please document in progress notes and discharge summary if you   are evaluating and /or treating any of the following: The medical record reflects the following:  Risk Factors: HFrEF, CKD3, HTN  Clinical Indicators: Cr 1.5-1.6  PN -  \"JERED/CKD in the setting of above. Nephrology following. Monitor. \"  Nephrology -  \"SCr 1.5-1.6 since admission which is at baseline for him. - Chronic kidney disease stage 3 - Hypertensive Nephrosclerosis  SCr 1.5-1.8 at baseline, follows with me in office\"  Treatment: Nephrology consult, serial labs and supportive care    Thank you,  Mady Hawk, RN, BSN, CRCR  Options provided:  -- JERED confirmed  -- JERED ruled out  -- Other - I will add my own diagnosis  -- Disagree - Not applicable / Not valid  -- Disagree - Clinically unable to determine / Unknown  -- Refer to Clinical Documentation Reviewer    PROVIDER RESPONSE TEXT:    After study, JERED was ruled out. Query created by: Mady Hawk on 2023 1:06 PM      QUERY TEXT:    Pt admitted with acute on chronic HFrEF. Pt noted to also have HTN, CAD, and   ICMP. If possible, please document in progress notes and discharge summary the   etiology of CHF, if able to be determined.       The medical record reflects the following:  Risk Factors: HFrEF, HTN, CAD, ICMP  Clinical Indicators:  Cardiology -  \"Acute HFrEF  Likely Ischemic Cardiomyopathy  Elevated Trop; flat trend  CAD  Remote hx of CABG (LIMA - LAD and SVG - PDA); 15-20 years ago  CKD IIIa/b  HTN\"  PN

## 2023-12-08 ENCOUNTER — FOLLOWUP TELEPHONE ENCOUNTER (OUTPATIENT)
Dept: TELEMETRY | Age: 83
End: 2023-12-08

## 2023-12-08 NOTE — TELEPHONE ENCOUNTER
Third and final attempt at hospital follow up without success. No answer and no ability to leave .   Nicole Renae RN

## 2023-12-08 NOTE — TELEPHONE ENCOUNTER
Second attempt at hospital follow up without answer or ability to leave VM. Will attempt again at a later time.  Paul Patel RN

## 2023-12-08 NOTE — TELEPHONE ENCOUNTER
First attempt at hospital follow up. No answer and no ability to leave VM. Will attempt again at a later time.  Cedric Ortega RN

## 2024-01-05 PROBLEM — R79.89 ELEVATED TROPONIN: Status: RESOLVED | Noted: 2023-12-06 | Resolved: 2024-01-05

## 2024-02-02 NOTE — PROGRESS NOTES
Hospital Follow Up      Patient Name: Sukumar Phillips  Primary Care physician: Ayo Heath MD    Reason for Referral/Chief Complaint: Sukumar Phillips is a 84 y.o. patient who is referred to cardiology clinic today for hospital follow up.     Chief Complaint   Patient presents with    Follow-Up from Hospital    Congestive Heart Failure    Coronary Artery Disease      History of Present Illness:     Sukumar Phillips is a 84 y.o. male with a medical history notable for HTN, HLD, non-obstructive carotid stenosis, CAD s/p CABG x 2 (LIMA - LAD and SVG - PDA), CKD, GI bleeding ulcers.    Patient presented  to [facility] 12/4/24 with complaints of SOB. Pt reported he had increased shortness of breath the past 2-3 days prior, with orthopnea. Increased LE edema over the weeks prior. Patient had not been taking aspirin. Reported he has not taken since his GIB in early 2023. He had not seen cardiology since 2021. [facility]. BP was mildly elevated; takes lopressor and reported his blood pressure was \"normal at home.\"    Patient underwent LHC 12/6/23 with Dr. Brambila. Findings severe multi vessel CAD with reduced LVEF. Medical management was recommended. He required IV diuresis.      Today, states he is feeling well. He states his breathing \"well not its good\" after diuresis in the hospital. He comes in with his wife. Patient sleeps in a chair which is not new for him. He he is bradycardia here and asymptomatic. At some point since discharge his Toprol 25mg daily was changed to 50mg Lopressor BID.     The patient denies chest pain, shortness of breath at rest and dyspnea with exertion. Denies palpitations, dizziness, near-syncope or ellen syncope. Denies paroxysmal nocturnal dyspnea, orthopnea, bendopnea, increasing lower extremity edema or weight gain.  The patient endorses highest level of activity as daily activity.     Past Medical History:   has a past medical history of

## 2024-02-06 ENCOUNTER — OFFICE VISIT (OUTPATIENT)
Dept: CARDIOLOGY CLINIC | Age: 84
End: 2024-02-06
Payer: MEDICARE

## 2024-02-06 VITALS
BODY MASS INDEX: 35.16 KG/M2 | HEART RATE: 49 BPM | SYSTOLIC BLOOD PRESSURE: 124 MMHG | DIASTOLIC BLOOD PRESSURE: 60 MMHG | WEIGHT: 224 LBS | HEIGHT: 67 IN | OXYGEN SATURATION: 96 %

## 2024-02-06 DIAGNOSIS — I50.21 ACUTE HFREF (HEART FAILURE WITH REDUCED EJECTION FRACTION) (HCC): Primary | ICD-10-CM

## 2024-02-06 DIAGNOSIS — E78.2 MIXED HYPERLIPIDEMIA: ICD-10-CM

## 2024-02-06 PROCEDURE — 99203 OFFICE O/P NEW LOW 30 MIN: CPT | Performed by: STUDENT IN AN ORGANIZED HEALTH CARE EDUCATION/TRAINING PROGRAM

## 2024-02-06 PROCEDURE — G8427 DOCREV CUR MEDS BY ELIG CLIN: HCPCS | Performed by: STUDENT IN AN ORGANIZED HEALTH CARE EDUCATION/TRAINING PROGRAM

## 2024-02-06 PROCEDURE — 3074F SYST BP LT 130 MM HG: CPT | Performed by: STUDENT IN AN ORGANIZED HEALTH CARE EDUCATION/TRAINING PROGRAM

## 2024-02-06 PROCEDURE — 1036F TOBACCO NON-USER: CPT | Performed by: STUDENT IN AN ORGANIZED HEALTH CARE EDUCATION/TRAINING PROGRAM

## 2024-02-06 PROCEDURE — 3078F DIAST BP <80 MM HG: CPT | Performed by: STUDENT IN AN ORGANIZED HEALTH CARE EDUCATION/TRAINING PROGRAM

## 2024-02-06 PROCEDURE — 93000 ELECTROCARDIOGRAM COMPLETE: CPT | Performed by: STUDENT IN AN ORGANIZED HEALTH CARE EDUCATION/TRAINING PROGRAM

## 2024-02-06 PROCEDURE — G8484 FLU IMMUNIZE NO ADMIN: HCPCS | Performed by: STUDENT IN AN ORGANIZED HEALTH CARE EDUCATION/TRAINING PROGRAM

## 2024-02-06 PROCEDURE — G8417 CALC BMI ABV UP PARAM F/U: HCPCS | Performed by: STUDENT IN AN ORGANIZED HEALTH CARE EDUCATION/TRAINING PROGRAM

## 2024-02-06 PROCEDURE — 1123F ACP DISCUSS/DSCN MKR DOCD: CPT | Performed by: STUDENT IN AN ORGANIZED HEALTH CARE EDUCATION/TRAINING PROGRAM

## 2024-02-06 RX ORDER — ASCORBIC ACID 500 MG
500 TABLET ORAL DAILY
COMMUNITY

## 2024-02-06 RX ORDER — LOSARTAN POTASSIUM 25 MG/1
25 TABLET ORAL DAILY
COMMUNITY

## 2024-02-06 RX ORDER — METOPROLOL TARTRATE 50 MG/1
50 TABLET, FILM COATED ORAL 2 TIMES DAILY
COMMUNITY

## 2024-02-06 RX ORDER — PANTOPRAZOLE SODIUM 40 MG/1
40 TABLET, DELAYED RELEASE ORAL
Qty: 180 TABLET | Refills: 3
Start: 2024-02-06 | End: 2025-01-31

## 2024-02-06 RX ORDER — FERROUS SULFATE 325(65) MG
325 TABLET ORAL
COMMUNITY

## 2024-02-06 RX ORDER — EZETIMIBE 10 MG/1
10 TABLET ORAL DAILY
Qty: 90 TABLET | Refills: 3 | Status: SHIPPED | OUTPATIENT
Start: 2024-02-06

## 2024-02-06 RX ORDER — NITROGLYCERIN 0.4 MG/1
0.4 TABLET SUBLINGUAL EVERY 5 MIN PRN
COMMUNITY

## 2024-02-06 RX ORDER — FLUTICASONE PROPIONATE AND SALMETEROL XINAFOATE 230; 21 UG/1; UG/1
2 AEROSOL, METERED RESPIRATORY (INHALATION) 2 TIMES DAILY
COMMUNITY

## 2024-02-06 NOTE — PATIENT INSTRUCTIONS
Plan   Weigh yourself daily. If you should have an increase in weight of 3 lbs or more, or notice increased swelling in your abdomen or lower extremities, take 40 mg Lasix.  Monitor your blood pressure at home twice a day, morning and night. Recommend a monitor for your bicep. Keep a log. Goal 130/80 or less. Monitor heart rate when checking your blood pressures.   Medication list updated. Continue all medications as prescribed.  Start taking Zetia 10 mg daily. Education material printed and provided to the patient.   Recommend having a limited Echocardiogram in six weeks.   We will call you with the results.   Schedule lab visit for EKG only due to Bradycardia.     Follow up with me in 3 months    Your provider has ordered testing for further evaluation.  An order/prescription has been included in your paper work.   To schedule outpatient testing, contact Central Scheduling by calling TIMOTHY ([phone]).

## 2024-02-21 ENCOUNTER — HOSPITAL ENCOUNTER (OUTPATIENT)
Dept: CARDIOLOGY | Age: 84
Discharge: HOME | End: 2024-02-21
Payer: MEDICARE

## 2024-02-21 DIAGNOSIS — I50.21 ACUTE HFREF (HEART FAILURE WITH REDUCED EJECTION FRACTION) (HCC): ICD-10-CM

## 2024-02-21 PROCEDURE — 93308 TTE F-UP OR LMTD: CPT

## 2024-02-22 ENCOUNTER — TELEPHONE (OUTPATIENT)
Dept: CARDIOLOGY CLINIC | Age: 84
End: 2024-02-22

## 2024-02-22 NOTE — TELEPHONE ENCOUNTER
----- Message from Johnathan Nathan DO sent at 2/22/2024  2:38 PM EST -----  I attempted to call patient to go over echo results. LVEF 35-40%, and likely closer to 40%. No indication for ICD (defibrillator at this point.) continue current medications and I will see him a next scheduled appointment in May.     AMP

## 2024-03-19 ENCOUNTER — TELEPHONE (OUTPATIENT)
Dept: CARDIOLOGY CLINIC | Age: 84
End: 2024-03-19

## 2024-03-19 NOTE — TELEPHONE ENCOUNTER
Pt called stating they fell in the wall at the house hit arm between arm and left shoulder [facility] they are waiting day or two see if it bruised if not they will be going to [facility] [place], pt would like to RS with GREGORY at the Cm office.    Pls advise [phone]

## 2024-04-17 NOTE — PROGRESS NOTES
Recommend follow-up with serial chest x-rays.  Mildly dilated atherosclerotic thoracic aorta with no aneurysm or dissection. Extensive coronary artery calcifications.  Old healed granulomatous disease in the mediastinum.  Previous cholecystectomy and postop changes along the upper abdomen no acute abnormality seen.  Previous gastric bypass surgery which is unchanged with no bowel obstruction  Moderate diverticulosis throughout the left colon with no pericolonic inflammation.     Impression and Plan:   Sinus bradycardia with First Degree AV block   RBBB with LAFB  Ischemic Cardiomyopathy   Chronic HFrEF, LVEF 39% by [facility] 2/2024   CAD s/p CABG x2   CKD III  HTN, well-controlled  HLD, LDL at goal 57.  Intolerance to Statins -itching   History of GIB  Iron Deficiency Anemia s/p venofer x3 doses in the hospital     Plan   Repeat ECG with sinus bradycardia, rate 56 bpm.  Right bundle branch block, first-degree AV block.  Similar to prior.  Overall patient doing well with no chest pain or shortness of breath.  Patient limited with ambulation secondary to chronic knee pain.  Patient not totally sure what medications he is taking.  However it does appear that he is back on Lopressor 50 mg twice daily.  Previously switched over to Toprol 25 mg daily due to sinus bradycardia in the office.  Will stop Lopressor 50 mg twice daily resume 25 mg Toprol daily.  Continue hydralazine 10 mg 3 times daily and Imdur 30 mg daily  Nephrology added back losartan.  Continue Lasix as needed for weight gain more than 3 pounds in 24 hours or worsening edema.  Continue aspirin 81 mg daily.  Continue Zetia (patient statin intolerant).  Most recent blood work from [facility] on 4/24, renal function stable with creatinine 1.7.  Hemoglobin improved to 13.  Will see patient in 3 months, we will discuss repeat echocardiogram.  If this shows LVEF less than 35%, discussion will be needed for ICD.    Follow up 3 months      I will address the patient's

## 2024-05-03 ENCOUNTER — OFFICE VISIT (OUTPATIENT)
Dept: CARDIOLOGY CLINIC | Age: 84
End: 2024-05-03
Payer: MEDICARE

## 2024-05-03 ENCOUNTER — TELEPHONE (OUTPATIENT)
Dept: CARDIOLOGY CLINIC | Age: 84
End: 2024-05-03

## 2024-05-03 VITALS
BODY MASS INDEX: 37.98 KG/M2 | HEIGHT: 67 IN | HEART RATE: 51 BPM | OXYGEN SATURATION: 95 % | DIASTOLIC BLOOD PRESSURE: 68 MMHG | SYSTOLIC BLOOD PRESSURE: 116 MMHG | WEIGHT: 242 LBS

## 2024-05-03 DIAGNOSIS — I50.9 ACUTE ON CHRONIC CONGESTIVE HEART FAILURE, UNSPECIFIED HEART FAILURE TYPE (HCC): Primary | ICD-10-CM

## 2024-05-03 DIAGNOSIS — I50.20 HFREF (HEART FAILURE WITH REDUCED EJECTION FRACTION) (HCC): ICD-10-CM

## 2024-05-03 DIAGNOSIS — E78.2 MIXED HYPERLIPIDEMIA: ICD-10-CM

## 2024-05-03 PROCEDURE — 99214 OFFICE O/P EST MOD 30 MIN: CPT | Performed by: STUDENT IN AN ORGANIZED HEALTH CARE EDUCATION/TRAINING PROGRAM

## 2024-05-03 PROCEDURE — G8427 DOCREV CUR MEDS BY ELIG CLIN: HCPCS | Performed by: STUDENT IN AN ORGANIZED HEALTH CARE EDUCATION/TRAINING PROGRAM

## 2024-05-03 PROCEDURE — 3074F SYST BP LT 130 MM HG: CPT | Performed by: STUDENT IN AN ORGANIZED HEALTH CARE EDUCATION/TRAINING PROGRAM

## 2024-05-03 PROCEDURE — 93000 ELECTROCARDIOGRAM COMPLETE: CPT | Performed by: STUDENT IN AN ORGANIZED HEALTH CARE EDUCATION/TRAINING PROGRAM

## 2024-05-03 PROCEDURE — 1123F ACP DISCUSS/DSCN MKR DOCD: CPT | Performed by: STUDENT IN AN ORGANIZED HEALTH CARE EDUCATION/TRAINING PROGRAM

## 2024-05-03 PROCEDURE — G8417 CALC BMI ABV UP PARAM F/U: HCPCS | Performed by: STUDENT IN AN ORGANIZED HEALTH CARE EDUCATION/TRAINING PROGRAM

## 2024-05-03 PROCEDURE — 1036F TOBACCO NON-USER: CPT | Performed by: STUDENT IN AN ORGANIZED HEALTH CARE EDUCATION/TRAINING PROGRAM

## 2024-05-03 PROCEDURE — 3078F DIAST BP <80 MM HG: CPT | Performed by: STUDENT IN AN ORGANIZED HEALTH CARE EDUCATION/TRAINING PROGRAM

## 2024-05-03 RX ORDER — HYDRALAZINE HYDROCHLORIDE 10 MG/1
10 TABLET, FILM COATED ORAL EVERY 8 HOURS SCHEDULED
Qty: 90 TABLET | Refills: 5 | Status: SHIPPED | OUTPATIENT
Start: 2024-05-03

## 2024-05-03 RX ORDER — METOPROLOL SUCCINATE 25 MG/1
25 TABLET, EXTENDED RELEASE ORAL DAILY
Qty: 30 TABLET | Refills: 3 | Status: SHIPPED | OUTPATIENT
Start: 2024-05-03

## 2024-05-03 RX ORDER — ISOSORBIDE MONONITRATE 30 MG/1
30 TABLET, EXTENDED RELEASE ORAL DAILY
Qty: 30 TABLET | Refills: 5 | Status: SHIPPED | OUTPATIENT
Start: 2024-05-03

## 2024-05-03 RX ORDER — FUROSEMIDE 40 MG/1
40 TABLET ORAL DAILY PRN
Qty: 30 TABLET | Refills: 3 | Status: SHIPPED | OUTPATIENT
Start: 2024-05-03

## 2024-05-03 RX ORDER — EZETIMIBE 10 MG/1
10 TABLET ORAL DAILY
Qty: 90 TABLET | Refills: 3 | Status: SHIPPED | OUTPATIENT
Start: 2024-05-03

## 2024-05-03 NOTE — TELEPHONE ENCOUNTER
Pt spouse presented herself in office to drop off pt medication list. Placed in AMP folder on 5.3.2024

## 2024-05-03 NOTE — TELEPHONE ENCOUNTER
Reviewed medication list. Patient dropped off an old AVS. This will not work as we have the same medications.     We need to know what exactly the patient is taking.     Left message for patient to return call

## 2024-05-06 NOTE — TELEPHONE ENCOUNTER
Pt stopped in main suite. Stated AMP requested copy of list of medications.  Pt dropped off copy of list of current medications taking. Put list in AMP folder.

## 2024-05-08 RX ORDER — CETIRIZINE HYDROCHLORIDE 10 MG/1
10 TABLET ORAL DAILY
COMMUNITY

## 2024-05-08 NOTE — TELEPHONE ENCOUNTER
Pt states he very rarely takes nitro, pt does take iron, losartan and unsure if he take Zetia. Pt will return call tomorrow to let office know about Zetia.

## 2024-05-08 NOTE — TELEPHONE ENCOUNTER
Attempted to reach pt, could not leave vm. The med list pt dropped off said it is from 12/26/23.     Zetia, iron, losartan, nitro, were not on list. We need to know if pt is taking these.

## 2024-05-10 NOTE — TELEPHONE ENCOUNTER
Patient and patients son are confused on their entire medication list - I repeated the meds that he stated last time we talked with patient he was taking and the son said he's \"not taking losartan\"     I informed them to have the pharmacy fax us a med list. Due to [facility].     Angelina can you watch the fax machine for this?

## 2024-05-14 RX ORDER — METOPROLOL SUCCINATE 50 MG/1
50 TABLET, EXTENDED RELEASE ORAL DAILY
COMMUNITY

## 2024-07-01 NOTE — PROGRESS NOTES
[facility]  Advanced CHF/Pulmonary Hypertension   Cardiac Evaluation      Sukumar Phillips  YOB: 1940    Date of Visit:  7/2/24      Chief Complaint   Patient presents with    New Patient    Congestive Heart Failure    Chest Pain        History of Present Illness:  Sukumar Phillips is a 84 y.o. male who presents from referral from Dr. Nathan for consultation and management of heart failure. He has a history of HTN, CAD, HLD, COPD, Stage 3 CKD.    Echo 2/21/2024 noted an EF of 35-40%    Today, 7/2/024, [facility] He does not see a cardiologist there. He reports within the last month he has has increased episodes for chest pain. He takes nitroglycerin as needed.  He reports cramping in his legs. Patient is taking all cardiac medications as prescribed and tolerates them well.  Patient denies current edema, chest pain, shortness of breath, palpitations, dizziness or syncope.     NYHA Class 2      Allergies   Allergen Reactions    Mixed Ragweed Shortness Of Breath     Pollen  Pollen    Lovastatin Itching    Omeprazole Dizziness or Vertigo    Seasonal      Pollen    Clindamycin Hcl Rash    Pravastatin Rash     Itch?  itching       Current Outpatient Medications   Medication Sig Dispense Refill    albuterol (PROVENTIL) (2.5 MG/3ML) 0.083% nebulizer solution Take 3 mLs by nebulization as needed for Wheezing      potassium chloride (KLOR-CON M) 10 MEQ extended release tablet Take 1 tablet by mouth daily      triamcinolone (ARISTOCORT) 0.5 % cream Apply topically 3 times daily Apply topically 3 times daily.      metoprolol succinate (TOPROL XL) 50 MG extended release tablet Take 0.5 tablets by mouth daily      Biotin 5000 MCG CAPS Take by mouth      Multiple Vitamins-Minerals (MULTI COMPLETE) CAPS Take by mouth      hydrALAZINE (APRESOLINE) 10 MG tablet Take 1 tablet by mouth every 8 hours 90 tablet 5    isosorbide mononitrate (IMDUR) 30 MG extended

## 2024-07-02 ENCOUNTER — OFFICE VISIT (OUTPATIENT)
Dept: CARDIOLOGY CLINIC | Age: 84
End: 2024-07-02

## 2024-07-02 VITALS
OXYGEN SATURATION: 96 % | DIASTOLIC BLOOD PRESSURE: 62 MMHG | HEART RATE: 51 BPM | SYSTOLIC BLOOD PRESSURE: 154 MMHG | WEIGHT: 244.5 LBS | HEIGHT: 67 IN | BODY MASS INDEX: 38.37 KG/M2

## 2024-07-02 DIAGNOSIS — I50.22 SYSTOLIC CHF, CHRONIC (HCC): Primary | ICD-10-CM

## 2024-07-02 DIAGNOSIS — I25.10 CORONARY ARTERY DISEASE INVOLVING NATIVE HEART WITHOUT ANGINA PECTORIS, UNSPECIFIED VESSEL OR LESION TYPE: ICD-10-CM

## 2024-07-02 DIAGNOSIS — I25.5 CARDIOMYOPATHY, ISCHEMIC: ICD-10-CM

## 2024-07-02 DIAGNOSIS — I10 ESSENTIAL HYPERTENSION, BENIGN: ICD-10-CM

## 2024-07-02 DIAGNOSIS — N18.4 STAGE 4 CHRONIC KIDNEY DISEASE (HCC): ICD-10-CM

## 2024-07-02 RX ORDER — ALBUTEROL SULFATE 2.5 MG/3ML
2.5 SOLUTION RESPIRATORY (INHALATION) PRN
COMMUNITY

## 2024-07-02 RX ORDER — METOPROLOL SUCCINATE 25 MG/1
25 TABLET, EXTENDED RELEASE ORAL DAILY
Qty: 90 TABLET | Refills: 3 | Status: SHIPPED | OUTPATIENT
Start: 2024-07-02

## 2024-07-02 RX ORDER — NITROGLYCERIN 0.4 MG/1
0.4 TABLET SUBLINGUAL EVERY 5 MIN PRN
Qty: 25 TABLET | Refills: 3 | Status: SHIPPED | OUTPATIENT
Start: 2024-07-02

## 2024-07-02 RX ORDER — TRIAMCINOLONE ACETONIDE 5 MG/G
CREAM TOPICAL 3 TIMES DAILY
COMMUNITY

## 2024-07-02 RX ORDER — POTASSIUM CHLORIDE 750 MG/1
10 TABLET, EXTENDED RELEASE ORAL DAILY
COMMUNITY

## 2024-07-02 RX ORDER — HYDRALAZINE HYDROCHLORIDE 25 MG/1
25 TABLET, FILM COATED ORAL EVERY 8 HOURS SCHEDULED
Qty: 135 TABLET | Refills: 3 | Status: SHIPPED | OUTPATIENT
Start: 2024-07-02

## 2024-07-02 NOTE — PATIENT INSTRUCTIONS
Plan:  Start jardiance 10 mg daily for improved heart function and fluid management  Samples provided today. [facility] to [facility]  Labs 1 month: CBC, CMP, BNP  Increase hydralazine 25 mg three times daily for blood pressure control   Follow up in October

## 2024-07-09 ENCOUNTER — TELEPHONE (OUTPATIENT)
Dept: CARDIOLOGY CLINIC | Age: 84
End: 2024-07-09

## 2024-07-09 NOTE — TELEPHONE ENCOUNTER
Received updated Med list [facility]. [facility] scanned into chart. Med list updated.

## 2024-07-19 LAB
A/G RATIO: 1.4 (ref 1–2.1)
ALBUMIN: 3.7 G/DL (ref 3.5–5)
ALP BLD-CCNC: 96 U/L (ref 33–140)
ALT SERPL-CCNC: 19 U/L (ref 0–60)
ANION GAP SERPL CALCULATED.3IONS-SCNC: 7 MMOL/L (ref 5–13)
AST SERPL-CCNC: 27 U/L (ref 0–40)
BASOPHILS ABSOLUTE: 0.05 10*3/UL (ref 0–0.2)
BASOPHILS RELATIVE PERCENT: 0.9 %
BILIRUB SERPL-MCNC: 0.4 MG/DL (ref 0.2–1.2)
BUN / CREAT RATIO: 17
BUN BLDV-MCNC: 30 MG/DL (ref 7–25)
CALCIUM SERPL-MCNC: 8.7 MG/DL (ref 8.5–10.5)
CHLORIDE BLD-SCNC: 108 MMOL/L (ref 98–110)
CO2: 26 MMOL/L (ref 22–29)
CREAT SERPL-MCNC: 1.78 MG/DL (ref 0.5–1.3)
EGFR (CKD-EPI): 37 SEE NOTE
EOSINOPHILS ABSOLUTE: 0.31 10*3/UL (ref 0–0.5)
EOSINOPHILS RELATIVE PERCENT: 5.6 %
GLOBULIN: 2.6 G/DL (ref 2–3.7)
GLUCOSE BLD-MCNC: 54 MG/DL (ref 71–99)
GRANULOCYTE ABSOLUTE COUNT: 3.51 10*3/UL (ref 1.5–7.8)
HCT VFR BLD CALC: 41.7 % (ref 40–51)
HEMOGLOBIN: 13.1 G/DL (ref 13.2–17.1)
IMMATURE GRANULOCYTES %: 0.02 10*3/UL (ref 0–0.1)
IMMATURE GRANULOCYTES %: 0.4 % (ref 0–2)
LEUKOCYTES, BLD: 5.53 10*3/UL (ref 3.8–10.8)
LYMPHOCYTES ABSOLUTE: 1.23 10*3/UL (ref 0.8–3.9)
LYMPHOCYTES RELATIVE PERCENT: 22.2 %
MCH RBC QN AUTO: 32.2 PG (ref 27–33)
MCHC RBC AUTO-ENTMCNC: 31.4 G/DL (ref 30–36)
MCV RBC AUTO: 102.5 FL (ref 80–100)
MONOCYTES ABSOLUTE: 0.41 10*3/UL (ref 0.2–0.9)
MONOCYTES RELATIVE PERCENT: 7.4 %
NEUTROPHILS RELATIVE PERCENT: 63.5 %
NUCLEATED RED BLOOD CELLS: 0 /100 WBC (ref 0–0)
PDW BLD-RTO: 13.7 % (ref 11–15)
PLATELET # BLD: 180 10*3/UL (ref 140–400)
PMV BLD AUTO: 10.7 FL (ref 9–13)
POTASSIUM SERPL-SCNC: 4.7 MMOL/L (ref 3.5–5.1)
PROTEIN FLUID: 6.3 G/DL (ref 6–8)
RBC # BLD: 4.07 10*6/UL (ref 4.2–5.8)
SODIUM BLD-SCNC: 141 MMOL/L (ref 135–146)

## 2024-07-31 ENCOUNTER — TELEPHONE (OUTPATIENT)
Dept: CARDIOLOGY CLINIC | Age: 84
End: 2024-07-31

## 2024-07-31 NOTE — TELEPHONE ENCOUNTER
Patient stopped in the office asking for his office note from Dr. Nathan to be faxed to his [employer]- Elmer Albarado @ [phone] (fax)

## 2024-08-15 NOTE — PROGRESS NOTES
(PROVENTIL;VENTOLIN;PROAIR) 108 (90 Base) MCG/ACT inhaler Inhale 2 puffs into the lungs every 4 hours as needed for Shortness of Breath or Wheezing 1/9/23  Yes Andree Bello MD   Cholecalciferol (VITAMIN D-3 PO) Take by mouth   Yes Isabella Tyler MD   levothyroxine (SYNTHROID) 88 MCG tablet Take 1 tablet by mouth daily  88 mcg   Yes Isabella Tyler MD   montelukast (SINGULAIR) 10 MG tablet Take 1 tablet by mouth nightly   Yes Isabella Tyler MD   cetirizine (ZYRTEC) 10 MG tablet Take 1 tablet by mouth daily Take 0.5 tab once daily  Patient not taking: Reported on 7/2/2024    Isabella Tyler MD   Biotin 5000 MCG CAPS Take by mouth  Patient not taking: Reported on 8/27/2024    Isabella Tyler MD   Multiple Vitamins-Minerals (MULTI COMPLETE) CAPS Take by mouth  Patient not taking: Reported on 8/27/2024    Isabella Tyler MD   tiotropium-olodaterol (STIOLTO) 2.5-2.5 MCG/ACT AERS Inhale 2 puffs into the lungs daily  Patient not taking: Reported on 7/9/2024    Isabella Tyler MD   aspirin EC 81 MG EC tablet Take 1 tablet by mouth daily  Patient not taking: Reported on 8/27/2024 12/7/23   Monroe Tan MD   Coenzyme Q10 (COQ10 PO) Take by mouth  Patient not taking: Reported on 8/27/2024    Isabella Tyler MD        CURRENT Medications:  No current facility-administered medications for this visit.    Allergies:  Mixed ragweed, Lovastatin, Omeprazole, Seasonal, Clindamycin hcl, and Pravastatin     Review of Systems:   A 14 point review of symptoms completed. Pertinent positives identified in the HPI, all other review of symptoms negative as below.      Objective:     Vitals:    08/27/24 1252   BP: 138/70   Pulse: 61   SpO2: 95%     PHYSICAL EXAM:    General:  Alert, cooperative, no distress, appears stated age   Head:  Normocephalic, atraumatic   Eyes:  Conjunctiva/corneas clear, anicteric sclerae    Nose: Nares normal, no drainage or sinus tenderness   Throat: No  III/IV  Intolerance to Statins -itching   History of GIB  Iron Deficiency Anemia s/p venofer x3 doses in the hospital     Plan   Patient overall doing well.  He has gained weight over period of several months.  He does have evidence of peripheral edema on today's exam.  Patient with chronic chest pain consistent with stable angina over the past year.  He underwent left heart cath in December 2023 which showed patent LIMA to LAD, SVG to RCA with severe multivessel disease.  No interventions were able to be performed, medical management was advised.  Patient also with mildly elevated blood pressure in office today, uncontrolled hypertension.  Full plan as below.  Continue taking jardiance 10mg daily, zetia 10mg daily, hydralazine 25mg every 8 hours, metoprolol 25mg daily.  Patient should be on losartan as recommended by nephrology.  Currently not on medication list but patient feels he is taking.  [facility].   Start taking plavix 75mg daily.  Will avoid aspirin given history of GI bleed.  However given his significant CAD history, single antiplatelet therapy is recommended.  Benefits and risks; no recent reports of bleeding.  In fact on a CBC in July 19, 2024,'s hemoglobin was 13.1; historically Hgb. 8-9s. Patient verbalizes understanding of the need for treatment and education provided at today's visit. Additional education materials will be provided in the AVS.   Increase imdur 30mg to twice a day. Take one tablet in morning and one in evening.   Start taking lasix 40mg every day for the next 5 days. Then you can go back to as needed.   Please obtain the following labs in one week; -CMP, LIPID PANEL, BNP  Schedule US carotid       Staff, please [facility] to the [facility]; patient's PCP to get recent notes. Most importantly to see if he was taken off losartan.     Follow up 4 months    Scribe's attestation:  This note was scribed in the presence of Dr. Johnathan Nathan DO. By

## 2024-08-27 ENCOUNTER — OFFICE VISIT (OUTPATIENT)
Dept: CARDIOLOGY CLINIC | Age: 84
End: 2024-08-27
Payer: MEDICARE

## 2024-08-27 ENCOUNTER — TELEPHONE (OUTPATIENT)
Dept: CARDIOLOGY CLINIC | Age: 84
End: 2024-08-27

## 2024-08-27 VITALS
DIASTOLIC BLOOD PRESSURE: 70 MMHG | SYSTOLIC BLOOD PRESSURE: 138 MMHG | HEIGHT: 67 IN | OXYGEN SATURATION: 95 % | BODY MASS INDEX: 39.55 KG/M2 | WEIGHT: 252 LBS | HEART RATE: 61 BPM

## 2024-08-27 DIAGNOSIS — I65.23 OCCLUSION AND STENOSIS OF BILATERAL CAROTID ARTERIES: ICD-10-CM

## 2024-08-27 DIAGNOSIS — D50.8 OTHER IRON DEFICIENCY ANEMIA: ICD-10-CM

## 2024-08-27 DIAGNOSIS — I25.5 ISCHEMIC CARDIOMYOPATHY: ICD-10-CM

## 2024-08-27 DIAGNOSIS — Z79.899 MEDICATION MANAGEMENT: ICD-10-CM

## 2024-08-27 DIAGNOSIS — I25.118 CORONARY ARTERY DISEASE OF NATIVE ARTERY OF NATIVE HEART WITH STABLE ANGINA PECTORIS (HCC): ICD-10-CM

## 2024-08-27 DIAGNOSIS — I50.23 ACUTE ON CHRONIC HFREF (HEART FAILURE WITH REDUCED EJECTION FRACTION) (HCC): ICD-10-CM

## 2024-08-27 DIAGNOSIS — E78.2 MIXED HYPERLIPIDEMIA: ICD-10-CM

## 2024-08-27 DIAGNOSIS — N18.32 STAGE 3B CHRONIC KIDNEY DISEASE (HCC): ICD-10-CM

## 2024-08-27 DIAGNOSIS — I65.29 STENOSIS OF CAROTID ARTERY, UNSPECIFIED LATERALITY: ICD-10-CM

## 2024-08-27 DIAGNOSIS — I10 UNCONTROLLED HYPERTENSION: Primary | ICD-10-CM

## 2024-08-27 PROCEDURE — G8427 DOCREV CUR MEDS BY ELIG CLIN: HCPCS | Performed by: STUDENT IN AN ORGANIZED HEALTH CARE EDUCATION/TRAINING PROGRAM

## 2024-08-27 PROCEDURE — 3078F DIAST BP <80 MM HG: CPT | Performed by: STUDENT IN AN ORGANIZED HEALTH CARE EDUCATION/TRAINING PROGRAM

## 2024-08-27 PROCEDURE — 1036F TOBACCO NON-USER: CPT | Performed by: STUDENT IN AN ORGANIZED HEALTH CARE EDUCATION/TRAINING PROGRAM

## 2024-08-27 PROCEDURE — G8417 CALC BMI ABV UP PARAM F/U: HCPCS | Performed by: STUDENT IN AN ORGANIZED HEALTH CARE EDUCATION/TRAINING PROGRAM

## 2024-08-27 PROCEDURE — 3075F SYST BP GE 130 - 139MM HG: CPT | Performed by: STUDENT IN AN ORGANIZED HEALTH CARE EDUCATION/TRAINING PROGRAM

## 2024-08-27 PROCEDURE — 99214 OFFICE O/P EST MOD 30 MIN: CPT | Performed by: STUDENT IN AN ORGANIZED HEALTH CARE EDUCATION/TRAINING PROGRAM

## 2024-08-27 PROCEDURE — 1123F ACP DISCUSS/DSCN MKR DOCD: CPT | Performed by: STUDENT IN AN ORGANIZED HEALTH CARE EDUCATION/TRAINING PROGRAM

## 2024-08-27 RX ORDER — ISOSORBIDE MONONITRATE 30 MG/1
30 TABLET, EXTENDED RELEASE ORAL 2 TIMES DAILY
Qty: 180 TABLET | Refills: 3 | Status: SHIPPED | OUTPATIENT
Start: 2024-08-27

## 2024-08-27 RX ORDER — CLOPIDOGREL BISULFATE 75 MG/1
75 TABLET ORAL DAILY
Qty: 90 TABLET | Refills: 3 | Status: SHIPPED | OUTPATIENT
Start: 2024-08-27

## 2024-08-27 NOTE — TELEPHONE ENCOUNTER
----- Message from Dr. Johnathan Nathan, DO sent at 8/27/2024  4:49 PM EDT -----  [facility] Dr Ivan. Trying to figure out if patient is on Losartan or if it was stopped. Thanks.

## 2024-08-27 NOTE — PATIENT INSTRUCTIONS
Continue taking jardiance 10mg daily, zetia 10mg daily, furosemide 40mg as needed, hydralazine 25mg every 8 hours, metoprolol 25mg daily  Start taking plavix 75mg daily. Patient verbalizes understanding of the need for treatment and education provided at today's visit. Additional education materials will be provided in the AVS.   Increase imdur 30mg to twice a day. Take one tablet in morning and one in evening.   Start taking lasix 40mg every day for the next 5 days. Then you can go back to as needed.   Please obtain the following labs in one week; -CMP, LIPID PANEL, BNP  Schedule US carotid   Your provider has ordered testing for further evaluation.  An order/prescription has been included in your paper work.   To schedule outpatient testing, contact Central Scheduling by calling [facility] ([phone]).

## 2024-08-28 ENCOUNTER — TELEPHONE (OUTPATIENT)
Dept: CARDIOLOGY CLINIC | Age: 84
End: 2024-08-28

## 2024-08-28 NOTE — TELEPHONE ENCOUNTER
Tried contacting patient, unable to leave message.     Can we please verify with him that he is not taking losartan? He was unsure of his medication list when he was here.

## 2024-08-28 NOTE — TELEPHONE ENCOUNTER
----- Message from Dr. Johnathan Nathan DO sent at 8/28/2024 12:26 PM EDT -----  [facility].  It does not appear patient is on losartan.  There is no documented reason why he is not.  There is no mention of losartan nor is it on his medication list.

## 2024-08-30 NOTE — TELEPHONE ENCOUNTER
Called and spoke to pt, he is not taking Losartan. Pt stated if [facility] calls medication in, please send to [facility] pharmacy in [place]. AMP what dose would you like?

## 2024-08-30 NOTE — TELEPHONE ENCOUNTER
Johnathan Nathan, Yanci Rankin; Eliane Stone, RN  Caller: Unspecified (2 days ago, 12:29 PM)  I think we can hold off for now.  I just do not have a good reason why it was stopped so I do not want to restart if it was stopped due to worsening renal function or hyperkalemia or something like that.  This can be revisited by Dr. Tay in the heart failure clinic on 10/8/2024 or with Dr. Hook his nephrologist in November.

## 2024-09-04 LAB
A/G RATIO: 1.5 (ref 1–2.1)
ALBUMIN: 3.7 G/DL (ref 3.5–5)
ALP BLD-CCNC: 91 U/L (ref 33–140)
ALT SERPL-CCNC: 18 U/L (ref 0–60)
ANION GAP SERPL CALCULATED.3IONS-SCNC: 9 MMOL/L (ref 5–13)
AST SERPL-CCNC: 29 U/L (ref 0–40)
BILIRUB SERPL-MCNC: 0.6 MG/DL (ref 0.2–1.2)
BUN / CREAT RATIO: 15
BUN BLDV-MCNC: 26 MG/DL (ref 7–25)
CALCIUM SERPL-MCNC: 8.7 MG/DL (ref 8.5–10.5)
CHLORIDE BLD-SCNC: 109 MMOL/L (ref 98–110)
CHOLESTEROL, TOTAL: 143 MG/DL (ref 125–199)
CO2: 21 MMOL/L (ref 22–29)
CREAT SERPL-MCNC: 1.74 MG/DL (ref 0.5–1.3)
EGFR (CKD-EPI): 38 SEE NOTE
GLOBULIN: 2.5 G/DL (ref 2–3.7)
GLUCOSE BLD-MCNC: 114 MG/DL (ref 71–99)
HDLC SERPL-MCNC: 49 MG/DL (ref 40–180)
LDL CHOLESTEROL: 75 MG/DL (ref 0–100)
NONHDLC SERPL-MCNC: 94 MG/DL (ref 0–129)
POTASSIUM SERPL-SCNC: 4.7 MMOL/L (ref 3.5–5.1)
PROTEIN FLUID: 6.2 G/DL (ref 6–8)
SODIUM BLD-SCNC: 139 MMOL/L (ref 135–146)
TRIGL SERPL-MCNC: 94 MG/DL (ref 0–149)

## 2024-09-25 ENCOUNTER — TELEPHONE (OUTPATIENT)
Dept: CARDIOLOGY CLINIC | Age: 84
End: 2024-09-25

## 2024-11-25 NOTE — PROGRESS NOTES
[facility]  Advanced CHF/Pulmonary Hypertension   Cardiac Evaluation      Sukumar Phillips  YOB: 1940    Date of Visit:  12/3/24      Chief Complaint   Patient presents with    Follow-up    Congestive Heart Failure        History of Present Illness:  Sukumar Phillips is a 84 y.o. male who presents from referral from Dr. Nathan for consultation and management of heart failure. He has a history of HTN, CAD s/p CABG X2, HLD, COPD, Stage 3 CKD.    Echo 2/21/2024 noted an EF of 35-40%    LOV, 7/2/024, [facility] He does not see a cardiologist there. He reports within the last month he has has increased episodes for chest pain. He takes nitroglycerin as needed.  He reports cramping in his legs.     Today, 12/3/2024, He reports he is doing well. [facility].  He is not on a high intensity statin due to itching and cramping with statins. Patient is taking all cardiac medications as prescribed and tolerates them well. Patient denies current edema, chest pain, shortness of breath, palpitations, dizziness or syncope.       NYHA Class 2      Allergies   Allergen Reactions    Mixed Ragweed Shortness Of Breath     Pollen  Pollen    Lovastatin Itching    Omeprazole Dizziness or Vertigo    Seasonal      Pollen    Clindamycin Hcl Rash    Pravastatin Rash     Itch?  itching       Current Outpatient Medications   Medication Sig Dispense Refill    MAGNESIUM ASPARTATE PO Take 800 mg by mouth in the morning and at bedtime      Coenzyme Q10 (COQ10) 100 MG CAPS Take 1 caplet by mouth daily      vitamin B-12 (CYANOCOBALAMIN) 100 MCG tablet Take 0.5 tablets by mouth daily      acetaminophen (TYLENOL) 500 MG tablet Take 2 tablets by mouth in the morning and at bedtime      Homeopathic Products (LEG CRAMPS PO) Take 2 caplets by mouth in the morning and at bedtime      empagliflozin (JARDIANCE) 25 MG tablet Take 1 tablet by mouth

## 2024-12-03 ENCOUNTER — OFFICE VISIT (OUTPATIENT)
Dept: CARDIOLOGY CLINIC | Age: 84
End: 2024-12-03
Payer: MEDICARE

## 2024-12-03 VITALS
HEIGHT: 67 IN | HEART RATE: 56 BPM | WEIGHT: 245.5 LBS | SYSTOLIC BLOOD PRESSURE: 98 MMHG | DIASTOLIC BLOOD PRESSURE: 50 MMHG | BODY MASS INDEX: 38.53 KG/M2 | OXYGEN SATURATION: 94 %

## 2024-12-03 DIAGNOSIS — N18.32 STAGE 3B CHRONIC KIDNEY DISEASE (HCC): ICD-10-CM

## 2024-12-03 DIAGNOSIS — Z95.1 S/P CABG (CORONARY ARTERY BYPASS GRAFT): ICD-10-CM

## 2024-12-03 DIAGNOSIS — I25.5 ISCHEMIC CARDIOMYOPATHY: ICD-10-CM

## 2024-12-03 DIAGNOSIS — I25.118 CORONARY ARTERY DISEASE OF NATIVE ARTERY OF NATIVE HEART WITH STABLE ANGINA PECTORIS (HCC): ICD-10-CM

## 2024-12-03 DIAGNOSIS — I50.22 SYSTOLIC CHF, CHRONIC (HCC): Primary | ICD-10-CM

## 2024-12-03 DIAGNOSIS — E78.5 HYPERLIPIDEMIA, UNSPECIFIED HYPERLIPIDEMIA TYPE: ICD-10-CM

## 2024-12-03 PROCEDURE — 1036F TOBACCO NON-USER: CPT | Performed by: INTERNAL MEDICINE

## 2024-12-03 PROCEDURE — 3074F SYST BP LT 130 MM HG: CPT | Performed by: INTERNAL MEDICINE

## 2024-12-03 PROCEDURE — G8484 FLU IMMUNIZE NO ADMIN: HCPCS | Performed by: INTERNAL MEDICINE

## 2024-12-03 PROCEDURE — G8417 CALC BMI ABV UP PARAM F/U: HCPCS | Performed by: INTERNAL MEDICINE

## 2024-12-03 PROCEDURE — 1123F ACP DISCUSS/DSCN MKR DOCD: CPT | Performed by: INTERNAL MEDICINE

## 2024-12-03 PROCEDURE — 99215 OFFICE O/P EST HI 40 MIN: CPT | Performed by: INTERNAL MEDICINE

## 2024-12-03 PROCEDURE — G8427 DOCREV CUR MEDS BY ELIG CLIN: HCPCS | Performed by: INTERNAL MEDICINE

## 2024-12-03 PROCEDURE — 1159F MED LIST DOCD IN RCRD: CPT | Performed by: INTERNAL MEDICINE

## 2024-12-03 PROCEDURE — 3078F DIAST BP <80 MM HG: CPT | Performed by: INTERNAL MEDICINE

## 2024-12-03 RX ORDER — ROSUVASTATIN CALCIUM 20 MG/1
20 TABLET, COATED ORAL DAILY
Qty: 90 TABLET | Refills: 3 | Status: SHIPPED | OUTPATIENT
Start: 2024-12-03

## 2024-12-03 RX ORDER — VITAMIN B COMPLEX
1 TABLET ORAL DAILY
COMMUNITY

## 2024-12-03 RX ORDER — UBIDECARENONE 75 MG
50 CAPSULE ORAL DAILY
COMMUNITY

## 2024-12-03 RX ORDER — ACETAMINOPHEN 500 MG
1000 TABLET ORAL 2 TIMES DAILY
COMMUNITY

## 2024-12-03 NOTE — PATIENT INSTRUCTIONS
Plan:  Start rosuvastatin (crestor) 20 mg daily for cholesterol management and history of coronary diease   Recommend taking half tablet of  jardiance 25 mg daily    Follow up with Dr. Nathan

## 2025-02-27 ENCOUNTER — HOSPITAL ENCOUNTER (OUTPATIENT)
Dept: VASCULAR LAB | Age: 85
Discharge: HOME | End: 2025-03-01
Attending: STUDENT IN AN ORGANIZED HEALTH CARE EDUCATION/TRAINING PROGRAM
Payer: MEDICARE

## 2025-02-27 DIAGNOSIS — I65.29 STENOSIS OF CAROTID ARTERY, UNSPECIFIED LATERALITY: ICD-10-CM

## 2025-02-27 LAB
VAS LEFT CCA DIST EDV: 10.5 CM/S
VAS LEFT CCA DIST PSV: 36.4 CM/S
VAS LEFT CCA MID EDV: 9.11 CM/S
VAS LEFT CCA MID PSV: 42.7 CM/S
VAS LEFT CCA PROX EDV: 11.2 CM/S
VAS LEFT CCA PROX PSV: 90.4 CM/S
VAS LEFT ECA EDV: 10.5 CM/S
VAS LEFT ECA PSV: 84.1 CM/S
VAS LEFT ICA DIST EDV: 16.2 CM/S
VAS LEFT ICA DIST PSV: 46.7 CM/S
VAS LEFT ICA MID EDV: 18.2 CM/S
VAS LEFT ICA MID PSV: 54.1 CM/S
VAS LEFT ICA PROX EDV: 13.3 CM/S
VAS LEFT ICA PROX PSV: 46.7 CM/S
VAS LEFT ICA/CCA PSV: 1.29
VAS LEFT SUBCLAVIAN PROX PSV: 155 CM/S
VAS LEFT VERTEBRAL EDV: 6.66 CM/S
VAS LEFT VERTEBRAL PSV: 20 CM/S
VAS RIGHT CCA DIST EDV: 10.6 CM/S
VAS RIGHT CCA DIST PSV: 55.9 CM/S
VAS RIGHT CCA MID EDV: 15.5 CM/S
VAS RIGHT CCA MID PSV: 58.4 CM/S
VAS RIGHT CCA PROX EDV: 13.7 CM/S
VAS RIGHT CCA PROX PSV: 57.2 CM/S
VAS RIGHT ECA EDV: 10.6 CM/S
VAS RIGHT ECA PSV: 64 CM/S
VAS RIGHT ICA DIST EDV: 13 CM/S
VAS RIGHT ICA DIST PSV: 37.3 CM/S
VAS RIGHT ICA MID EDV: 13.7 CM/S
VAS RIGHT ICA MID PSV: 44.7 CM/S
VAS RIGHT ICA PROX EDV: 9.9 CM/S
VAS RIGHT ICA PROX PSV: 29.8 CM/S
VAS RIGHT ICA/CCA PSV: 0.77
VAS RIGHT SUBCLAVIAN PROX PSV: 74.2 CM/S
VAS RIGHT VERTEBRAL EDV: 9.11 CM/S
VAS RIGHT VERTEBRAL PSV: 24.5 CM/S

## 2025-02-27 PROCEDURE — 93880 EXTRACRANIAL BILAT STUDY: CPT

## 2025-02-27 NOTE — PROGRESS NOTES
CARDIOLOGY OFFICE NOTE      Patient Name: Sukumar Phillips  Primary Care physician: Ayo Heath MD    Reason for Referral/Chief Complaint: Sukumar Phillips is a 85 y.o. patient who presents today for cardiology follow up.     Chief Complaint   Patient presents with    3 Month Follow-Up    Hypertension    Coronary Artery Disease    Hyperlipidemia    Congestive Heart Failure    Cardiomyopathy     ischemic    Shortness of Breath    Chest Pain     Once every 4 weeks or so, takes nitro pain subsides      History of Present Illness:   Sukumar Phillips is a very pleasant 85 y.o. male with a medical history notable for HTN, HLD, non-obstructive carotid stenosis, CAD s/p CABG x 2 (LIMA - LAD and SVG - PDA), CKD, GI bleeding ulcers.  Patient is a  of the [employer].  [facility]  Patient [facility]  [facility]/4/24 with complaints of SOB. Pt reported he had increased shortness of breath the past 2-3 days prior, with orthopnea. Increased LE edema over the weeks prior. Patient had not been taking aspirin. Reported he has not taken since his GIB in early 2023. He had not seen cardiology since 2021. [facility] but [facility] not [facility]. BP was mildly elevated; takes lopressor and reported his blood pressure was \"normal at home.\"  Patient underwent LHC 12/6/23 with Dr. Brambila. Findings severe multi vessel CAD with reduced LVEF. Medical management was recommended. He required IV diuresis. OV 2/6/24 He stated his breathing \"well not its good\" after diuresis in the hospital. Patient sleeps in a chair which is not new for him. He he is bradycardia here and asymptomatic. At some point since discharge his Toprol 25mg daily was changed to 50mg Lopressor BID.    OV 5/3/24 \"patient states well I am above ground.\" Patient reported he never has chest pain. Reported he is breathing well. Denies any edema. Sleeps in a chair, but has for the last 25 years. Son reports his \"blood pressure is usually

## 2025-02-28 ENCOUNTER — OFFICE VISIT (OUTPATIENT)
Dept: CARDIOLOGY CLINIC | Age: 85
End: 2025-02-28

## 2025-02-28 VITALS
BODY MASS INDEX: 36.88 KG/M2 | WEIGHT: 235 LBS | DIASTOLIC BLOOD PRESSURE: 70 MMHG | OXYGEN SATURATION: 94 % | HEART RATE: 80 BPM | SYSTOLIC BLOOD PRESSURE: 112 MMHG | HEIGHT: 67 IN

## 2025-02-28 DIAGNOSIS — I10 PRIMARY HYPERTENSION: ICD-10-CM

## 2025-02-28 DIAGNOSIS — I25.119 CORONARY ARTERY DISEASE INVOLVING NATIVE CORONARY ARTERY OF NATIVE HEART WITH ANGINA PECTORIS: ICD-10-CM

## 2025-02-28 DIAGNOSIS — I34.0 NONRHEUMATIC MITRAL VALVE REGURGITATION: ICD-10-CM

## 2025-02-28 DIAGNOSIS — R07.9 CHEST PAIN, UNSPECIFIED TYPE: ICD-10-CM

## 2025-02-28 DIAGNOSIS — I50.21 ACUTE HFREF (HEART FAILURE WITH REDUCED EJECTION FRACTION) (HCC): ICD-10-CM

## 2025-02-28 DIAGNOSIS — E78.2 MIXED HYPERLIPIDEMIA: ICD-10-CM

## 2025-02-28 DIAGNOSIS — I25.5 ISCHEMIC CARDIOMYOPATHY: ICD-10-CM

## 2025-02-28 DIAGNOSIS — Z79.899 MEDICATION MANAGEMENT: ICD-10-CM

## 2025-02-28 DIAGNOSIS — I25.708 CORONARY ARTERY DISEASE OF BYPASS GRAFT OF NATIVE HEART WITH STABLE ANGINA PECTORIS: ICD-10-CM

## 2025-02-28 DIAGNOSIS — I45.2 RIGHT BUNDLE BRANCH BLOCK (RBBB) WITH LEFT ANTERIOR FASCICULAR BLOCK (LAFB): ICD-10-CM

## 2025-02-28 DIAGNOSIS — D50.8 OTHER IRON DEFICIENCY ANEMIA: ICD-10-CM

## 2025-02-28 DIAGNOSIS — I10 UNCONTROLLED HYPERTENSION: Primary | ICD-10-CM

## 2025-02-28 DIAGNOSIS — J44.9 CHRONIC OBSTRUCTIVE PULMONARY DISEASE, UNSPECIFIED COPD TYPE (HCC): ICD-10-CM

## 2025-02-28 DIAGNOSIS — J44.1 ASTHMA WITH COPD WITH EXACERBATION (HCC): ICD-10-CM

## 2025-02-28 DIAGNOSIS — N18.32 STAGE 3B CHRONIC KIDNEY DISEASE (HCC): ICD-10-CM

## 2025-02-28 DIAGNOSIS — I65.23 BILATERAL CAROTID ARTERY STENOSIS: ICD-10-CM

## 2025-02-28 NOTE — PATIENT INSTRUCTIONS
Continue taking plavix 75mg daily, zetia 10mg daily, hydralazine 25mg every 8 hours, imudr 30mg twice a day, rosuvastatin 20mg daily, metoprolol 25mg daily.   The proper use and anticipated side effects of nitroglycerine has been carefully explained.  If a single episode of chest pain is not relieved by one tablet, the patient will try another within 5 minutes; and if this doesn't relieve the pain, the patient is instructed to call 911 for transportation to an emergency department.  Schedule echo to assess heart structure and function  Please obtain the following labs; -LIPID FASTING    Your provider has ordered testing for further evaluation.  An order/prescription has been included in your paper work.   To schedule outpatient testing, contact Central Scheduling by calling [address] [place] [address] ([phone]).

## 2025-03-03 ENCOUNTER — TELEPHONE (OUTPATIENT)
Dept: CARDIOLOGY CLINIC | Age: 85
End: 2025-03-03

## 2025-03-03 ENCOUNTER — HOSPITAL ENCOUNTER (OUTPATIENT)
Age: 85
Discharge: HOME | End: 2025-03-03
Payer: MEDICARE

## 2025-03-03 DIAGNOSIS — Z79.899 MEDICATION MANAGEMENT: ICD-10-CM

## 2025-03-03 DIAGNOSIS — I25.5 CARDIOMYOPATHY, ISCHEMIC: ICD-10-CM

## 2025-03-03 DIAGNOSIS — I25.10 CORONARY ARTERY DISEASE INVOLVING NATIVE HEART WITHOUT ANGINA PECTORIS, UNSPECIFIED VESSEL OR LESION TYPE: ICD-10-CM

## 2025-03-03 DIAGNOSIS — I50.22 SYSTOLIC CHF, CHRONIC (HCC): ICD-10-CM

## 2025-03-03 DIAGNOSIS — I50.23 ACUTE ON CHRONIC HFREF (HEART FAILURE WITH REDUCED EJECTION FRACTION) (HCC): ICD-10-CM

## 2025-03-03 LAB
ALBUMIN SERPL-MCNC: 3.7 G/DL (ref 3.4–5)
ALBUMIN/GLOB SERPL: 1.4 {RATIO} (ref 1.1–2.2)
ALP SERPL-CCNC: 86 U/L (ref 40–129)
ALT SERPL-CCNC: 29 U/L (ref 10–40)
ANION GAP SERPL CALCULATED.3IONS-SCNC: 10 MMOL/L (ref 3–16)
AST SERPL-CCNC: 44 U/L (ref 15–37)
BILIRUB SERPL-MCNC: 0.4 MG/DL (ref 0–1)
BUN SERPL-MCNC: 25 MG/DL (ref 7–20)
CALCIUM SERPL-MCNC: 8.8 MG/DL (ref 8.3–10.6)
CHLORIDE SERPL-SCNC: 107 MMOL/L (ref 99–110)
CHOLEST SERPL-MCNC: 125 MG/DL (ref 0–199)
CO2 SERPL-SCNC: 22 MMOL/L (ref 21–32)
CREAT SERPL-MCNC: 1.7 MG/DL (ref 0.8–1.3)
GFR SERPLBLD CREATININE-BSD FMLA CKD-EPI: 39 ML/MIN/{1.73_M2}
GLUCOSE SERPL-MCNC: 95 MG/DL (ref 70–99)
HDLC SERPL-MCNC: 51 MG/DL (ref 40–60)
LDLC SERPL CALC-MCNC: 54 MG/DL
NT-PROBNP SERPL-MCNC: 1444 PG/ML (ref 0–449)
POTASSIUM SERPL-SCNC: 4.7 MMOL/L (ref 3.5–5.1)
PROT SERPL-MCNC: 6.4 G/DL (ref 6.4–8.2)
SODIUM SERPL-SCNC: 139 MMOL/L (ref 136–145)
TRIGL SERPL-MCNC: 100 MG/DL (ref 0–150)
VLDLC SERPL CALC-MCNC: 20 MG/DL

## 2025-03-03 PROCEDURE — 80053 COMPREHEN METABOLIC PANEL: CPT

## 2025-03-03 PROCEDURE — 80061 LIPID PANEL: CPT

## 2025-03-03 PROCEDURE — 83880 ASSAY OF NATRIURETIC PEPTIDE: CPT

## 2025-03-03 PROCEDURE — 36415 COLL VENOUS BLD VENIPUNCTURE: CPT

## 2025-03-03 RX ORDER — HYDRALAZINE HYDROCHLORIDE 25 MG/1
25 TABLET, FILM COATED ORAL EVERY 8 HOURS SCHEDULED
Qty: 135 TABLET | Refills: 3 | Status: SHIPPED | OUTPATIENT
Start: 2025-03-03

## 2025-03-03 RX ORDER — NITROGLYCERIN 0.4 MG/1
0.4 TABLET SUBLINGUAL EVERY 5 MIN PRN
Qty: 25 TABLET | Refills: 0 | Status: SHIPPED | OUTPATIENT
Start: 2025-03-03

## 2025-03-03 NOTE — TELEPHONE ENCOUNTER
Per pt also needs refill on   HydrAlazine 25 mg 1 tab q8hrs.    [facility]  90 day supply    Last ov 2.28.25-AMP

## 2025-03-03 NOTE — TELEPHONE ENCOUNTER
Pt has a carotid study completed on 02/27/2025 and would like to know if we have the results. Please advise.

## 2025-03-03 NOTE — TELEPHONE ENCOUNTER
Pt is requesting refill of Nitroglycerin. Preferred pharmacy is    [facility] [id number] - [facility] [address] [place] [phone]     Last ov 02/28/2025 amp

## 2025-03-03 NOTE — TELEPHONE ENCOUNTER
We told him in the office the results. But anyways Mild disease. Rx sent for SL nitro     You routed conversation to Johnathan Nathan, DO       Patient informed and VU

## 2025-03-03 NOTE — TELEPHONE ENCOUNTER
Last Office Visit: 2/28/2025 Provider: [facility]  **Is provider OOT? No    Next Office Visit: 5/30/2025 Provider: AMP      LAST LABS:   BMP:   Lab Results   Component Value Date/Time     09/04/2024 07:05 AM    K 4.7 09/04/2024 07:05 AM    K 4.0 12/07/2023 06:10 AM     09/04/2024 07:05 AM    CO2 21 09/04/2024 07:05 AM    BUN 26 09/04/2024 07:05 AM    CREATININE 1.74 09/04/2024 07:05 AM    GLUCOSE 114 09/04/2024 07:05 AM    CALCIUM 8.7 09/04/2024 07:05 AM    LABGLOM 37 12/07/2023 06:10 AM       NITRO Already filled earlier today.

## 2025-03-18 ENCOUNTER — HOSPITAL ENCOUNTER (OUTPATIENT)
Age: 85
Discharge: HOME | End: 2025-03-18
Payer: MEDICARE

## 2025-03-18 DIAGNOSIS — Z79.899 MEDICATION MANAGEMENT: ICD-10-CM

## 2025-03-18 LAB
ALBUMIN SERPL-MCNC: 3.6 G/DL (ref 3.4–5)
ALBUMIN/GLOB SERPL: 1.3 {RATIO} (ref 1.1–2.2)
ALP SERPL-CCNC: 79 U/L (ref 40–129)
ALT SERPL-CCNC: 29 U/L (ref 10–40)
ANION GAP SERPL CALCULATED.3IONS-SCNC: 12 MMOL/L (ref 3–16)
AST SERPL-CCNC: 50 U/L (ref 15–37)
BILIRUB SERPL-MCNC: 0.6 MG/DL (ref 0–1)
BUN SERPL-MCNC: 26 MG/DL (ref 7–20)
CALCIUM SERPL-MCNC: 8.9 MG/DL (ref 8.3–10.6)
CHLORIDE SERPL-SCNC: 103 MMOL/L (ref 99–110)
CHOLEST SERPL-MCNC: 102 MG/DL (ref 0–199)
CO2 SERPL-SCNC: 24 MMOL/L (ref 21–32)
CREAT SERPL-MCNC: 1.8 MG/DL (ref 0.8–1.3)
GFR SERPLBLD CREATININE-BSD FMLA CKD-EPI: 37 ML/MIN/{1.73_M2}
GLUCOSE SERPL-MCNC: 139 MG/DL (ref 70–99)
HDLC SERPL-MCNC: 52 MG/DL (ref 40–60)
LDLC SERPL CALC-MCNC: 33 MG/DL
POTASSIUM SERPL-SCNC: 4.7 MMOL/L (ref 3.5–5.1)
PROT SERPL-MCNC: 6.3 G/DL (ref 6.4–8.2)
SODIUM SERPL-SCNC: 139 MMOL/L (ref 136–145)
TRIGL SERPL-MCNC: 87 MG/DL (ref 0–150)
VLDLC SERPL CALC-MCNC: 17 MG/DL

## 2025-03-18 PROCEDURE — 80053 COMPREHEN METABOLIC PANEL: CPT

## 2025-03-18 PROCEDURE — 36415 COLL VENOUS BLD VENIPUNCTURE: CPT

## 2025-03-18 PROCEDURE — 80061 LIPID PANEL: CPT

## 2025-03-19 ENCOUNTER — RESULTS FOLLOW-UP (OUTPATIENT)
Dept: CARDIOLOGY CLINIC | Age: 85
End: 2025-03-19

## 2025-04-22 ENCOUNTER — HOSPITAL ENCOUNTER (OUTPATIENT)
Dept: CARDIOLOGY | Age: 85
Discharge: HOME | End: 2025-04-24
Attending: STUDENT IN AN ORGANIZED HEALTH CARE EDUCATION/TRAINING PROGRAM
Payer: MEDICARE

## 2025-04-22 VITALS
BODY MASS INDEX: 36.88 KG/M2 | DIASTOLIC BLOOD PRESSURE: 70 MMHG | SYSTOLIC BLOOD PRESSURE: 112 MMHG | WEIGHT: 235 LBS | HEIGHT: 67 IN

## 2025-04-22 DIAGNOSIS — I25.119 CORONARY ARTERY DISEASE INVOLVING NATIVE CORONARY ARTERY OF NATIVE HEART WITH ANGINA PECTORIS: ICD-10-CM

## 2025-04-22 DIAGNOSIS — R07.9 CHEST PAIN, UNSPECIFIED TYPE: ICD-10-CM

## 2025-04-22 DIAGNOSIS — I34.0 NONRHEUMATIC MITRAL VALVE REGURGITATION: ICD-10-CM

## 2025-04-22 DIAGNOSIS — I10 PRIMARY HYPERTENSION: ICD-10-CM

## 2025-04-22 LAB
ECHO AO ASC DIAM: 4.1 CM
ECHO AO ASCENDING AORTA INDEX: 1.89 CM/M2
ECHO AO ROOT DIAM: 3.9 CM
ECHO AO ROOT INDEX: 1.8 CM/M2
ECHO AV AREA PEAK VELOCITY: 2.1 CM2
ECHO AV AREA VTI: 2 CM2
ECHO AV AREA/BSA PEAK VELOCITY: 1 CM2/M2
ECHO AV AREA/BSA VTI: 0.9 CM2/M2
ECHO AV CUSP MM: 1.4 CM
ECHO AV MEAN GRADIENT: 4 MMHG
ECHO AV MEAN GRADIENT: 4 MMHG
ECHO AV MEAN VELOCITY: 0.9 M/S
ECHO AV PEAK GRADIENT: 8 MMHG
ECHO AV PEAK GRADIENT: 8 MMHG
ECHO AV PEAK VELOCITY: 1.4 M/S
ECHO AV VELOCITY RATIO: 0.57
ECHO AV VTI: 36.3 CM
ECHO BSA: 2.24 M2
ECHO EST RA PRESSURE: 8 MMHG
ECHO LA AREA 2C: 22.9 CM2
ECHO LA AREA 4C: 21.9 CM2
ECHO LA DIAMETER INDEX: 1.8 CM/M2
ECHO LA DIAMETER: 3.9 CM
ECHO LA MAJOR AXIS: 6.5 CM
ECHO LA MINOR AXIS: 6.6 CM
ECHO LA TO AORTIC ROOT RATIO: 1
ECHO LA VOL BP: 63 ML (ref 18–58)
ECHO LA VOL MOD A2C: 65 ML (ref 18–58)
ECHO LA VOL MOD A4C: 61 ML (ref 18–58)
ECHO LA VOL/BSA BIPLANE: 29 ML/M2 (ref 16–34)
ECHO LA VOLUME INDEX MOD A2C: 30 ML/M2 (ref 16–34)
ECHO LA VOLUME INDEX MOD A4C: 28 ML/M2 (ref 16–34)
ECHO LV E' LATERAL VELOCITY: 6.09 CM/S
ECHO LV E' SEPTAL VELOCITY: 8.81 CM/S
ECHO LV EDV 3D: 191 ML
ECHO LV EDV INDEX 3D: 88 ML/M2
ECHO LV EF PHYSICIAN: 45 %
ECHO LV EJECTION FRACTION 3D: 44 %
ECHO LV ESV 3D: 107 ML
ECHO LV ESV INDEX 3D: 49 ML/M2
ECHO LV FRACTIONAL SHORTENING: 20 % (ref 28–44)
ECHO LV GLOBAL LONGITUDINAL STRAIN (GLS): -18.5 %
ECHO LV GLOBAL LONGITUDINAL STRAIN (GLS): -23 %
ECHO LV GLOBAL LONGITUDINAL STRAIN (GLS): -23.3 %
ECHO LV GLOBAL LONGITUDINAL STRAIN (GLS): -28.5 %
ECHO LV INTERNAL DIMENSION DIASTOLE INDEX: 2.3 CM/M2
ECHO LV INTERNAL DIMENSION DIASTOLIC: 5 CM (ref 4.2–5.9)
ECHO LV INTERNAL DIMENSION SYSTOLIC INDEX: 1.84 CM/M2
ECHO LV INTERNAL DIMENSION SYSTOLIC: 4 CM
ECHO LV ISOVOLUMETRIC RELAXATION TIME (IVRT): 98 MS
ECHO LV IVSD: 1 CM (ref 0.6–1)
ECHO LV MASS 2D: 169.9 G (ref 88–224)
ECHO LV MASS 3D INDEX: 76 G/M2
ECHO LV MASS 3D: 165 G
ECHO LV MASS INDEX 2D: 78.3 G/M2 (ref 49–115)
ECHO LV POSTERIOR WALL DIASTOLIC: 0.9 CM (ref 0.6–1)
ECHO LV RELATIVE WALL THICKNESS RATIO: 0.36
ECHO LVOT AREA: 3.8 CM2
ECHO LVOT AV VTI INDEX: 0.52
ECHO LVOT DIAM: 2.2 CM
ECHO LVOT MEAN GRADIENT: 1 MMHG
ECHO LVOT PEAK GRADIENT: 2 MMHG
ECHO LVOT PEAK VELOCITY: 0.8 M/S
ECHO LVOT STROKE VOLUME INDEX: 32.7 ML/M2
ECHO LVOT SV: 71 ML
ECHO LVOT VTI: 18.7 CM
ECHO MV A VELOCITY: 1.07 M/S
ECHO MV E DECELERATION TIME (DT): 333 MS
ECHO MV E VELOCITY: 0.72 M/S
ECHO MV E/A RATIO: 0.67
ECHO MV E/E' LATERAL: 11.82
ECHO MV E/E' RATIO (AVERAGED): 10
ECHO MV E/E' SEPTAL: 8.17
ECHO RA AREA 4C: 15.4 CM2
ECHO RA END SYSTOLIC VOLUME APICAL 4 CHAMBER INDEX BSA: 17 ML/M2
ECHO RA VOLUME: 36 ML
ECHO RIGHT VENTRICULAR SYSTOLIC PRESSURE (RVSP): 31 MMHG
ECHO RV FREE WALL PEAK S': 6 CM/S
ECHO RV TAPSE: 0.9 CM (ref 1.7–?)
ECHO TV REGURGITANT MAX VELOCITY: 2.42 M/S
ECHO TV REGURGITANT PEAK GRADIENT: 23 MMHG

## 2025-04-22 PROCEDURE — 93306 TTE W/DOPPLER COMPLETE: CPT

## 2025-04-25 ENCOUNTER — RESULTS FOLLOW-UP (OUTPATIENT)
Dept: CARDIOLOGY | Age: 85
End: 2025-04-25

## 2025-05-19 ENCOUNTER — TELEPHONE (OUTPATIENT)
Dept: CARDIOLOGY CLINIC | Age: 85
End: 2025-05-19

## 2025-05-19 NOTE — TELEPHONE ENCOUNTER
Dr. Stephania [facility] called to ask [facility] if pt could take her recommendation of  Tart Cherry extract. She is requesting the pt take it for Muscle spasms and joint pain. She' said it also has heart benefits. She has requested a call back from AMP letting her know if the pt can or cannot take. Please advice.  Dr. Stephania Miller #  [phone]

## 2025-05-28 DIAGNOSIS — I25.10 CORONARY ARTERY DISEASE INVOLVING NATIVE HEART WITHOUT ANGINA PECTORIS, UNSPECIFIED VESSEL OR LESION TYPE: ICD-10-CM

## 2025-05-28 RX ORDER — NITROGLYCERIN 0.4 MG/1
TABLET SUBLINGUAL
Qty: 25 TABLET | Refills: 0 | Status: SHIPPED | OUTPATIENT
Start: 2025-05-28

## 2025-06-27 ENCOUNTER — TELEPHONE (OUTPATIENT)
Dept: CARDIOLOGY CLINIC | Age: 85
End: 2025-06-27

## 2025-06-27 NOTE — TELEPHONE ENCOUNTER
Pt called regarding medication the [facility] wants to prescribe for the pt.  [facility] Dr. Heath wants to put the pt on Cherry Tart Extract for leg cramps and pains.  They would like to get [facility] approval before they place pt on med.  Dr. Heath's nurse Jarrett can be reached at [phone].

## 2025-06-27 NOTE — TELEPHONE ENCOUNTER
Patient was told this last month.    Reason for Call:  Other appointment    Detailed comments: Please call and reschedule, left msg on 10/09 an 10/11    Phone Number Patient can be reached at: Cell number on file:    Telephone Information:   Mobile [phone]       Best Time: na    Can we leave a detailed message on this number? Not Applicable    Call taken on 10/11/2019 at 8:48 AM by Ysabel Sheldon

## 2025-06-27 NOTE — TELEPHONE ENCOUNTER
Pt returned call and was told that last month 05/19 that [facility] approved him to take the Cherry Tart Extract. Pt v/u and stated that the would let the [facility] know.

## 2025-08-07 ENCOUNTER — OFFICE VISIT (OUTPATIENT)
Dept: CARDIOLOGY CLINIC | Age: 85
End: 2025-08-07

## 2025-08-07 VITALS
DIASTOLIC BLOOD PRESSURE: 60 MMHG | WEIGHT: 236 LBS | HEIGHT: 67 IN | BODY MASS INDEX: 37.04 KG/M2 | OXYGEN SATURATION: 96 % | SYSTOLIC BLOOD PRESSURE: 116 MMHG | HEART RATE: 52 BPM

## 2025-08-07 DIAGNOSIS — I10 PRIMARY HYPERTENSION: ICD-10-CM

## 2025-08-07 DIAGNOSIS — I25.10 CORONARY ARTERY DISEASE INVOLVING NATIVE CORONARY ARTERY OF NATIVE HEART WITHOUT ANGINA PECTORIS: Primary | ICD-10-CM

## 2025-08-07 DIAGNOSIS — I25.5 ISCHEMIC CARDIOMYOPATHY: ICD-10-CM

## 2025-08-07 DIAGNOSIS — J44.9 CHRONIC OBSTRUCTIVE PULMONARY DISEASE, UNSPECIFIED COPD TYPE (HCC): ICD-10-CM

## 2025-08-07 DIAGNOSIS — I65.23 CAROTID STENOSIS, BILATERAL: ICD-10-CM

## 2025-08-07 DIAGNOSIS — I45.2 RBBB (RIGHT BUNDLE BRANCH BLOCK WITH LEFT ANTERIOR FASCICULAR BLOCK): ICD-10-CM

## 2025-08-07 DIAGNOSIS — E78.00 PURE HYPERCHOLESTEROLEMIA: ICD-10-CM

## 2025-08-07 DIAGNOSIS — I50.22 HEART FAILURE WITH MID-RANGE EJECTION FRACTION (HFMEF) (HCC): ICD-10-CM

## 2025-08-07 DIAGNOSIS — N18.32 STAGE 3B CHRONIC KIDNEY DISEASE (HCC): ICD-10-CM

## 2025-08-07 DIAGNOSIS — Z95.1 S/P CABG (CORONARY ARTERY BYPASS GRAFT): ICD-10-CM

## 2025-08-07 RX ORDER — NITROGLYCERIN 0.4 MG/1
0.4 TABLET SUBLINGUAL EVERY 5 MIN PRN
Qty: 25 TABLET | Refills: 3 | Status: SHIPPED | OUTPATIENT
Start: 2025-08-07

## 2025-08-07 RX ORDER — ALBUTEROL SULFATE 90 UG/1
2 INHALANT RESPIRATORY (INHALATION) EVERY 4 HOURS PRN
Qty: 18 G | Refills: 0 | Status: SHIPPED | OUTPATIENT
Start: 2025-08-07

## (undated) DEVICE — FORCEPS BX L240CM JAW DIA2.8MM L CAP W/ NDL MIC MESH TOOTH

## (undated) DEVICE — KIT INF CTRL 2OZ LUB TBNG L12FT DBL END BRSH SYR OP4

## (undated) DEVICE — CONMED SCOPE SAVER BITE BLOCK, 20X27 MM: Brand: SCOPE SAVER

## (undated) DEVICE — ENDO CARRY-ON PROCEDURE KIT INCLUDES SUCTION TUBING, LUBRICANT, GAUZE, BIOHAZARD STICKER, TRANSPORT PAD AND INTERCEPT BEDSIDE KIT.: Brand: ENDO CARRY-ON PROCEDURE KIT

## (undated) DEVICE — ENDOSCOPIC KIT 2 12 FT OP4 DE2 GWN SYR

## (undated) DEVICE — ELECTRODE,ECG,STRESS,FOAM,3PK: Brand: MEDLINE

## (undated) DEVICE — Z DISCONTINUED USE 2276105 GOWN PROTCT UNIV CHST W28IN L49IN SL 24IN BLU SPUNBOND FLM

## (undated) DEVICE — CANNULA NSL AD TBNG L7FT PVC STR NONFLARED PRNG O2 DEL W STD

## (undated) DEVICE — AIRLIFE™ NASAL OXYGEN CANNULA CURVED, NONFLARED TIP, WITH 7' FEET (2.1 M) CRUSH RESISTANT TUBING, OVER-THE-EAR STYLE: Brand: AIRLIFE™

## (undated) DEVICE — ELECTRODE ECG MONITR FOAM TEAR DROP ADLT RED